# Patient Record
Sex: MALE | Race: WHITE | NOT HISPANIC OR LATINO | URBAN - METROPOLITAN AREA
[De-identification: names, ages, dates, MRNs, and addresses within clinical notes are randomized per-mention and may not be internally consistent; named-entity substitution may affect disease eponyms.]

---

## 2018-08-17 LAB — HCV AB SER-ACNC: NON REACTIVE

## 2020-07-10 ENCOUNTER — OFFICE VISIT (OUTPATIENT)
Dept: FAMILY MEDICINE CLINIC | Facility: CLINIC | Age: 65
End: 2020-07-10
Payer: COMMERCIAL

## 2020-07-10 VITALS
TEMPERATURE: 97.7 F | HEIGHT: 71 IN | BODY MASS INDEX: 40.18 KG/M2 | RESPIRATION RATE: 16 BRPM | SYSTOLIC BLOOD PRESSURE: 122 MMHG | OXYGEN SATURATION: 95 % | HEART RATE: 80 BPM | DIASTOLIC BLOOD PRESSURE: 82 MMHG | WEIGHT: 287 LBS

## 2020-07-10 DIAGNOSIS — Z12.5 PROSTATE CANCER SCREENING: ICD-10-CM

## 2020-07-10 DIAGNOSIS — Z28.39 IMMUNIZATIONS INCOMPLETE: ICD-10-CM

## 2020-07-10 DIAGNOSIS — Z00.00 MEDICARE ANNUAL WELLNESS VISIT, INITIAL: ICD-10-CM

## 2020-07-10 DIAGNOSIS — I10 ESSENTIAL HYPERTENSION: Primary | ICD-10-CM

## 2020-07-10 PROBLEM — S90.219A SUBUNGUAL HEMATOMA OF GREAT TOE: Status: ACTIVE | Noted: 2020-07-10

## 2020-07-10 PROBLEM — E66.01 CLASS 3 SEVERE OBESITY IN ADULT (HCC): Status: ACTIVE | Noted: 2020-07-10

## 2020-07-10 PROBLEM — E66.813 CLASS 3 SEVERE OBESITY IN ADULT (HCC): Status: ACTIVE | Noted: 2020-07-10

## 2020-07-10 PROCEDURE — G0438 PPPS, INITIAL VISIT: HCPCS | Performed by: FAMILY MEDICINE

## 2020-07-10 PROCEDURE — 1036F TOBACCO NON-USER: CPT | Performed by: FAMILY MEDICINE

## 2020-07-10 PROCEDURE — 99214 OFFICE O/P EST MOD 30 MIN: CPT | Performed by: FAMILY MEDICINE

## 2020-07-10 PROCEDURE — 90732 PPSV23 VACC 2 YRS+ SUBQ/IM: CPT | Performed by: FAMILY MEDICINE

## 2020-07-10 PROCEDURE — 1125F AMNT PAIN NOTED PAIN PRSNT: CPT | Performed by: FAMILY MEDICINE

## 2020-07-10 PROCEDURE — 1160F RVW MEDS BY RX/DR IN RCRD: CPT | Performed by: FAMILY MEDICINE

## 2020-07-10 PROCEDURE — G0009 ADMIN PNEUMOCOCCAL VACCINE: HCPCS | Performed by: FAMILY MEDICINE

## 2020-07-10 PROCEDURE — 1170F FXNL STATUS ASSESSED: CPT | Performed by: FAMILY MEDICINE

## 2020-07-10 PROCEDURE — 3008F BODY MASS INDEX DOCD: CPT | Performed by: FAMILY MEDICINE

## 2020-07-10 PROCEDURE — 4040F PNEUMOC VAC/ADMIN/RCVD: CPT | Performed by: FAMILY MEDICINE

## 2020-07-10 RX ORDER — OLMESARTAN MEDOXOMIL AND HYDROCHLOROTHIAZIDE 20/12.5 20; 12.5 MG/1; MG/1
1 TABLET ORAL DAILY
COMMUNITY
End: 2020-07-10 | Stop reason: SDUPTHER

## 2020-07-10 RX ORDER — OLMESARTAN MEDOXOMIL AND HYDROCHLOROTHIAZIDE 20/12.5 20; 12.5 MG/1; MG/1
1 TABLET ORAL DAILY
Qty: 90 TABLET | Refills: 4 | Status: SHIPPED | OUTPATIENT
Start: 2020-07-10 | End: 2020-07-21 | Stop reason: SDUPTHER

## 2020-07-10 RX ORDER — AMLODIPINE BESYLATE 5 MG/1
5 TABLET ORAL DAILY
Qty: 90 TABLET | Refills: 4 | Status: SHIPPED | OUTPATIENT
Start: 2020-07-10 | End: 2020-07-21 | Stop reason: SDUPTHER

## 2020-07-10 RX ORDER — AMLODIPINE BESYLATE 5 MG/1
5 TABLET ORAL DAILY
COMMUNITY
End: 2020-07-10 | Stop reason: SDUPTHER

## 2020-07-10 NOTE — PROGRESS NOTES
Assessment and Plan:     Problem List Items Addressed This Visit        Cardiovascular and Mediastinum    Essential hypertension - Primary     Well controlled         Relevant Medications    amLODIPine (NORVASC) 5 mg tablet    olmesartan-hydrochlorothiazide (BENICAR HCT) 20-12 5 MG per tablet    Other Relevant Orders    Comprehensive metabolic panel       Other    Prostate cancer screening     Check PSA         Relevant Orders    PSA, Total Screen    Medicare annual wellness visit, initial      Other Visit Diagnoses     Immunizations incomplete        Relevant Orders    PNEUMOCOCCAL POLYSACCHARIDE VACCINE 23-VALENT =>1YO SQ IM           Preventive health issues were discussed with patient, and age appropriate screening tests were ordered as noted in patient's After Visit Summary  Personalized health advice and appropriate referrals for health education or preventive services given if needed, as noted in patient's After Visit Summary       History of Present Illness:     Patient presents for Medicare Annual Wellness visit    Patient Care Team:  Mandy Jauregui MD as PCP - General (Family Medicine)  Mandy Jauregui MD as PCP - [address] [place] [address] (RTE)     Problem List:     Patient Active Problem List   Diagnosis    Prostate cancer screening    Medicare annual wellness visit, initial    Essential hypertension    Subungual hematoma of great toe    Class 3 severe obesity in adult [facility])      Past Medical and Surgical History:     Past Medical History:   Diagnosis Date    Hypertension      Past Surgical History:   Procedure Laterality Date    COLONOSCOPY  04/27/2015    TONSILLECTOMY      VASECTOMY        Family History:     Family History   Problem Relation Age of Onset    Cancer Mother     Hypertension Father       Social History:        Social History     Socioeconomic History    Marital status: /Civil Union     Spouse name: None    Number of children: None    Years of education: None    Highest education level: None   Occupational History    None   Social Needs    Financial resource strain: None    Food insecurity:     Worry: None     Inability: None    Transportation needs:     Medical: None     Non-medical: None   Tobacco Use    Smoking status: Never Smoker    Smokeless tobacco: Never Used   Substance and Sexual Activity    Alcohol use: Yes     Frequency: 2-4 times a month     Drinks per session: 1 or 2     Binge frequency: Never     Comment: occasional    Drug use: Never    Sexual activity: None   Lifestyle    Physical activity:     Days per week: None     Minutes per session: None    Stress: None   Relationships    Social connections:     Talks on phone: None     Gets together: None     Attends Faith service: None     Active member of club or organization: None     Attends meetings of clubs or organizations: None     Relationship status: None    Intimate partner violence:     Fear of current or ex partner: None     Emotionally abused: None     Physically abused: None     Forced sexual activity: None   Other Topics Concern    None   Social History Narrative    None      Medications and Allergies:     Current Outpatient Medications   Medication Sig Dispense Refill    amLODIPine (NORVASC) 5 mg tablet Take 1 tablet (5 mg total) by mouth daily 90 tablet 4    olmesartan-hydrochlorothiazide (BENICAR HCT) 20-12 5 MG per tablet Take 1 tablet by mouth daily 90 tablet 4     No current facility-administered medications for this visit        No Known Allergies   Immunizations:     Immunization History   Administered Date(s) Administered    Tdap 08/04/2017      Health Maintenance:         Topic Date Due    Hepatitis C Screening  Completed         Topic Date Due    Pneumococcal Vaccine: 65+ Years (1 of 2 - PCV13) 05/13/2019    Influenza Vaccine  07/01/2020      Medicare Health Risk Assessment:     /82 (BP Location: Right arm, Patient Position: Sitting)   Pulse 80   Temp 97 7 °F (36 5 °C)   Resp 16   Ht 5' 11" (1 803 m)   Wt 130 kg (287 lb)   SpO2 95%   BMI 40 03 kg/m²          Health Risk Assessment:   Patient rates overall health as very good  Patient feels that their physical health rating is same  Eyesight was rated as same  Hearing was rated as slightly worse  Patient feels that their emotional and mental health rating is slightly better  Pain experienced in the last 7 days has been none  Patient states that he has experienced weight loss or gain in last 6 months  Depression Screening:   PHQ-2 Score: 0      Fall Risk Screening: In the past year, patient has experienced: no history of falling in past year      Home Safety:  Patient does not have trouble with stairs inside or outside of their home  Patient has working smoke alarms and has working carbon monoxide detector  Home safety hazards include: none  Nutrition:   Current diet is Regular  Medications:   Patient is currently taking over-the-counter supplements  OTC medications include: multi-vitamins, vitamin d  Patient is able to manage medications  Activities of Daily Living (ADLs)/Instrumental Activities of Daily Living (IADLs):   Walk and transfer into and out of bed and chair?: Yes  Dress and groom yourself?: Yes    Bathe or shower yourself?: Yes    Feed yourself?  Yes  Do your laundry/housekeeping?: Yes  Manage your money, pay your bills and track your expenses?: Yes  Make your own meals?: Yes    Do your own shopping?: Yes    Previous Hospitalizations:   Any hospitalizations or ED visits within the last 12 months?: No      Advance Care Planning:   Living will: No    Durable POA for healthcare: No    Advanced directive: No    Advanced directive counseling given: No    Five wishes given: Yes    Patient declined ACP directive: Yes    End of Life Decisions reviewed with patient: Yes    Provider agrees with end of life decisions: Yes      Cognitive Screening:   Provider or family/friend/caregiver concerned regarding cognition?: No    PREVENTIVE SCREENINGS      Cardiovascular Screening:      Due for: Lipid Panel      Diabetes Screening:       Due for: Blood Glucose      Colorectal Cancer Screening:     General: Screening Not Indicated      Prostate Cancer Screening:      Due for: PSA      Osteoporosis Screening:    General: Screening Not Indicated      Abdominal Aortic Aneurysm (AAA) Screening:    Risk factors include: age between 73-67 yo        General: Screening Not Indicated      Lung Cancer Screening:     General: Screening Not Indicated      Hepatitis C Screening:    General: Screening Current and Screening Not Indicated    Assessment/Plan:         Problem List Items Addressed This Visit        Cardiovascular and Mediastinum    Essential hypertension - Primary     Well controlled         Relevant Medications    amLODIPine (NORVASC) 5 mg tablet    olmesartan-hydrochlorothiazide (BENICAR HCT) 20-12 5 MG per tablet    Other Relevant Orders    Comprehensive metabolic panel       Other    Prostate cancer screening     Check PSA         Relevant Orders    PSA, Total Screen    Medicare annual wellness visit, initial      Other Visit Diagnoses     Immunizations incomplete        Relevant Orders    PNEUMOCOCCAL POLYSACCHARIDE VACCINE 23-VALENT =>1YO SQ IM            Subjective:  pt here for medicare wellness and interval visitt for HTN [t due fpr  Labs PSA cmp and lipids pt had colonoscopy non    Smoker is overweight pt has noticed dark spots on both large toenails last month no pain     Patient ID: Paul Liang is a 76 y o  male  HPI    The following portions of the patient's history were reviewed and updated as appropriate:   He has a past medical history of Hypertension  ,  does not have any pertinent problems on file  ,   has a past surgical history that includes Colonoscopy (04/27/2015); Tonsillectomy; and Vasectomy  ,  family history includes Cancer in his mother; Hypertension in his father  ,   reports that he has never smoked  He has never used smokeless tobacco  He reports that he drinks alcohol  He reports that he does not use drugs  ,  has No Known Allergies     Current Outpatient Medications   Medication Sig Dispense Refill    amLODIPine (NORVASC) 5 mg tablet Take 1 tablet (5 mg total) by mouth daily 90 tablet 4    olmesartan-hydrochlorothiazide (BENICAR HCT) 20-12 5 MG per tablet Take 1 tablet by mouth daily 90 tablet 4     No current facility-administered medications for this visit  Review of Systems   Constitutional: Negative for appetite change, chills, fatigue and fever  Respiratory: Negative for cough, chest tightness and shortness of breath  Cardiovascular: Negative for chest pain, palpitations and leg swelling  Gastrointestinal: Negative for abdominal pain, constipation, diarrhea, nausea and vomiting  Genitourinary: Negative for difficulty urinating and frequency  Musculoskeletal: Negative for arthralgias, back pain and neck pain  Skin: Positive for color change (both large toenails)  Negative for rash  Neurological: Negative for dizziness, weakness, light-headedness, numbness and headaches  Hematological: Does not bruise/bleed easily  Psychiatric/Behavioral: Negative for dysphoric mood and sleep disturbance  The patient is not nervous/anxious  Objective:  Vitals:    07/10/20 0830   BP: 122/82   BP Location: Right arm   Patient Position: Sitting   Pulse: 80   Resp: 16   Temp: 97 7 °F (36 5 °C)   SpO2: 95%   Weight: 130 kg (287 lb)   Height: 5' 11" (1 803 m)     Body mass index is 40 03 kg/m²  Physical Exam   Constitutional: He is oriented to person, place, and time  He appears well-developed  No distress  HENT:   Mouth/Throat: Oropharynx is clear and moist    Eyes: Pupils are equal, round, and reactive to light  Conjunctivae and EOM are normal    Neck: Normal range of motion  Neck supple  Carotid bruit is not present  No thyromegaly present     Cardiovascular: Normal rate, regular rhythm, normal heart sounds and intact distal pulses  No murmur heard  Pulmonary/Chest: Effort normal and breath sounds normal  No respiratory distress  He exhibits no tenderness  Abdominal: Soft  Bowel sounds are normal  He exhibits no distension  There is no tenderness  Lymphadenopathy:     He has no cervical adenopathy  Neurological: He is alert and oriented to person, place, and time  He displays normal reflexes  No cranial nerve deficit  Skin: Skin is warm and dry  Large toenails with dark  Spots appear like subungal hematomas   Psychiatric: He has a normal mood and affect  His behavior is normal  Thought content normal    Vitals reviewed  BMI Counseling: Body mass index is 40 03 kg/m²  The BMI is above normal  Nutrition recommendations include reducing portion sizes, 3-5 servings of fruits/vegetables daily, consuming healthier snacks, moderation in carbohydrate intake and increasing intake of lean protein  Exercise recommendations include moderate aerobic physical activity for 150 minutes/week, exercising 3-5 times per week and strength training exercises    Onelia Albarado MD

## 2020-07-10 NOTE — PATIENT INSTRUCTIONS

## 2020-07-21 DIAGNOSIS — I10 ESSENTIAL HYPERTENSION: ICD-10-CM

## 2020-07-21 RX ORDER — OLMESARTAN MEDOXOMIL AND HYDROCHLOROTHIAZIDE 20/12.5 20; 12.5 MG/1; MG/1
1 TABLET ORAL DAILY
Qty: 90 TABLET | Refills: 4 | Status: SHIPPED | OUTPATIENT
Start: 2020-07-21 | End: 2021-07-06

## 2020-07-21 RX ORDER — AMLODIPINE BESYLATE 5 MG/1
5 TABLET ORAL DAILY
Qty: 90 TABLET | Refills: 4 | Status: SHIPPED | OUTPATIENT
Start: 2020-07-21 | End: 2021-07-06

## 2020-07-21 NOTE — TELEPHONE ENCOUNTER
Needs new script w/refills for a 90 day supply for:   Olmesartan- hydrochlorothiazide 20-12 5mg, 1x a day;   Amlodipine 5mg, 1x a day    [id number] [facility]

## 2020-08-02 LAB
BUN SERPL-MCNC: 14 MG/DL (ref 7–25)
BUN/CREAT SERPL: NORMAL (CALC) (ref 6–22)
CALCIUM SERPL-MCNC: 9.4 MG/DL (ref 8.6–10.3)
CHLORIDE SERPL-SCNC: 103 MMOL/L (ref 98–110)
CO2 SERPL-SCNC: 29 MMOL/L (ref 20–32)
CREAT SERPL-MCNC: 0.9 MG/DL (ref 0.7–1.25)
GLUCOSE SERPL-MCNC: 90 MG/DL (ref 65–99)
POTASSIUM SERPL-SCNC: 4.2 MMOL/L (ref 3.5–5.3)
PSA SERPL-MCNC: 0.6 NG/ML
SL AMB EGFR AFRICAN AMERICAN: 103 ML/MIN/1.73M2
SL AMB EGFR NON AFRICAN AMERICAN: 89 ML/MIN/1.73M2
SODIUM SERPL-SCNC: 139 MMOL/L (ref 135–146)

## 2021-03-30 DIAGNOSIS — Z23 ENCOUNTER FOR IMMUNIZATION: ICD-10-CM

## 2021-04-03 ENCOUNTER — IMMUNIZATIONS (OUTPATIENT)
Dept: FAMILY MEDICINE CLINIC | Facility: HOSPITAL | Age: 66
End: 2021-04-03

## 2021-04-03 DIAGNOSIS — Z23 ENCOUNTER FOR IMMUNIZATION: Primary | ICD-10-CM

## 2021-04-03 PROCEDURE — 91300 SARS-COV-2 / COVID-19 MRNA VACCINE (PFIZER-BIONTECH) 30 MCG: CPT

## 2021-04-03 PROCEDURE — 0001A SARS-COV-2 / COVID-19 MRNA VACCINE (PFIZER-BIONTECH) 30 MCG: CPT

## 2021-04-27 ENCOUNTER — IMMUNIZATIONS (OUTPATIENT)
Dept: FAMILY MEDICINE CLINIC | Facility: HOSPITAL | Age: 66
End: 2021-04-27

## 2021-04-27 DIAGNOSIS — Z23 ENCOUNTER FOR IMMUNIZATION: Primary | ICD-10-CM

## 2021-04-27 PROCEDURE — 91300 SARS-COV-2 / COVID-19 MRNA VACCINE (PFIZER-BIONTECH) 30 MCG: CPT

## 2021-04-27 PROCEDURE — 0002A SARS-COV-2 / COVID-19 MRNA VACCINE (PFIZER-BIONTECH) 30 MCG: CPT

## 2021-07-06 DIAGNOSIS — I10 ESSENTIAL HYPERTENSION: ICD-10-CM

## 2021-07-06 RX ORDER — AMLODIPINE BESYLATE 5 MG/1
TABLET ORAL
Qty: 90 TABLET | Refills: 3 | Status: SHIPPED | OUTPATIENT
Start: 2021-07-06 | End: 2022-06-24

## 2021-07-06 RX ORDER — OLMESARTAN MEDOXOMIL AND HYDROCHLOROTHIAZIDE 20/12.5 20; 12.5 MG/1; MG/1
1 TABLET ORAL DAILY
Qty: 90 TABLET | Refills: 3 | Status: SHIPPED | OUTPATIENT
Start: 2021-07-06 | End: 2022-06-24

## 2021-07-12 PROBLEM — S90.219A SUBUNGUAL HEMATOMA OF GREAT TOE: Status: RESOLVED | Noted: 2020-07-10 | Resolved: 2021-07-12

## 2021-07-12 NOTE — PROGRESS NOTES
Assessment/Plan:         Problem List Items Addressed This Visit        Cardiovascular and Mediastinum    Essential hypertension     Well controlled            Other    Medicare annual wellness visit, initial     reviewed         Class 3 severe obesity in adult [facility])     Discussion on diet and exercise         Annual physical exam - Primary     ABN signed pt wants complete physical         Relevant Orders    Comprehensive metabolic panel    Lipid panel    PSA, Total Screen    CBC and differential    Urinalysis with microscopic      Other Visit Diagnoses     Body mass index (BMI)40 0-44 9, adult ([address] )                Subjective:  Pt here for interval visit hTN obesity pt wants to have regular physical ABN signed     Patient ID: Araseli Owens is a 78 y o  male  HPI    The following portions of the patient's history were reviewed and updated as appropriate:   Past Medical History:  He has a past medical history of Hypertension  ,  _______________________________________________________________________  Medical Problems:  does not have any pertinent problems on file ,  _______________________________________________________________________  Past Surgical History:   has a past surgical history that includes Colonoscopy (04/27/2015); Tonsillectomy; and Vasectomy  ,  _______________________________________________________________________  Family History:  family history includes Cancer in his mother; Hypertension in his father ,  _______________________________________________________________________  Social History:   reports that he has never smoked  He has never used smokeless tobacco  He reports current alcohol use of about 4 0 standard drinks of alcohol per week  He reports that he does not use drugs  ,  _______________________________________________________________________  Allergies:  has No Known Allergies     _______________________________________________________________________  Current Outpatient Medications Medication Sig Dispense Refill    amLODIPine (NORVASC) 5 mg tablet TAKE 1 TABLET BY MOUTH  DAILY 90 tablet 3    olmesartan-hydrochlorothiazide (BENICAR HCT) 20-12 5 MG per tablet TAKE 1 TABLET BY MOUTH  DAILY 90 tablet 3     No current facility-administered medications for this visit      _______________________________________________________________________  Review of Systems   Constitutional: Negative for appetite change, chills, fatigue and fever  Respiratory: Negative for cough, chest tightness and shortness of breath  Cardiovascular: Negative for chest pain, palpitations and leg swelling  Gastrointestinal: Negative for abdominal pain, constipation, diarrhea, nausea and vomiting  Genitourinary: Negative for difficulty urinating and frequency  Musculoskeletal: Negative for arthralgias, back pain and neck pain  Skin: Negative for rash  Neurological: Negative for dizziness, weakness, light-headedness, numbness and headaches  Hematological: Does not bruise/bleed easily  Psychiatric/Behavioral: Negative for dysphoric mood and sleep disturbance  The patient is not nervous/anxious  Objective:  Vitals:    07/20/21 0852   BP: 130/70   BP Location: Left arm   Patient Position: Sitting   Pulse: 72   Resp: 16   Temp: 98 3 °F (36 8 °C)   SpO2: 94%   Weight: 132 kg (292 lb)   Height: 5' 11" (1 803 m)     Body mass index is 40 73 kg/m²  Physical Exam  Vitals reviewed  Constitutional:       General: He is not in acute distress  Appearance: Normal appearance  He is well-developed  He is obese  He is not ill-appearing  Eyes:      Extraocular Movements: Extraocular movements intact  Conjunctiva/sclera: Conjunctivae normal       Pupils: Pupils are equal, round, and reactive to light  Neck:      Thyroid: No thyromegaly  Vascular: No carotid bruit  Cardiovascular:      Rate and Rhythm: Normal rate and regular rhythm  Pulses: Normal pulses        Heart sounds: Normal heart sounds  No murmur heard  Pulmonary:      Effort: Pulmonary effort is normal       Breath sounds: Normal breath sounds  Chest:      Chest wall: No tenderness  Abdominal:      General: Bowel sounds are normal  There is no distension  Palpations: Abdomen is soft  Tenderness: There is no abdominal tenderness  Musculoskeletal:      Cervical back: Normal range of motion and neck supple  Comments: Varicose veins both lower extremities   Lymphadenopathy:      Cervical: No cervical adenopathy  Skin:     General: Skin is warm and dry  Comments: Hyperpigmented anterior lower legs for years no change   Neurological:      General: No focal deficit present  Mental Status: He is alert and oriented to person, place, and time  Mental status is at baseline  Cranial Nerves: No cranial nerve deficit  Psychiatric:         Mood and Affect: Mood normal          Behavior: Behavior normal        BMI Counseling: Body mass index is 40 73 kg/m²  The BMI is above normal  Nutrition recommendations include 3-5 servings of fruits/vegetables daily, reducing fast food intake, consuming healthier snacks, decreasing soda and/or juice intake and moderation in carbohydrate intake  Exercise recommendations include exercising 3-5 times per week and strength training exercises

## 2021-07-14 ENCOUNTER — RA CDI HCC (OUTPATIENT)
Dept: OTHER | Facility: HOSPITAL | Age: 66
End: 2021-07-14

## 2021-07-14 NOTE — PROGRESS NOTES
[facility]  coding opportunities          Chart reviewed, no opportunity found: CHART REVIEWED, NO OPPORTUNITY FOUND                     Patients insurance company: [employer]

## 2021-07-20 ENCOUNTER — OFFICE VISIT (OUTPATIENT)
Dept: FAMILY MEDICINE CLINIC | Facility: CLINIC | Age: 66
End: 2021-07-20
Payer: COMMERCIAL

## 2021-07-20 VITALS
SYSTOLIC BLOOD PRESSURE: 130 MMHG | OXYGEN SATURATION: 94 % | HEART RATE: 72 BPM | TEMPERATURE: 98.3 F | DIASTOLIC BLOOD PRESSURE: 70 MMHG | WEIGHT: 292 LBS | BODY MASS INDEX: 40.88 KG/M2 | RESPIRATION RATE: 16 BRPM | HEIGHT: 71 IN

## 2021-07-20 DIAGNOSIS — E66.01 CLASS 3 SEVERE OBESITY DUE TO EXCESS CALORIES WITH SERIOUS COMORBIDITY AND BODY MASS INDEX (BMI) OF 40.0 TO 44.9 IN ADULT (HCC): ICD-10-CM

## 2021-07-20 DIAGNOSIS — I10 ESSENTIAL HYPERTENSION: ICD-10-CM

## 2021-07-20 DIAGNOSIS — Z00.00 MEDICARE ANNUAL WELLNESS VISIT, INITIAL: ICD-10-CM

## 2021-07-20 DIAGNOSIS — Z00.00 ANNUAL PHYSICAL EXAM: Primary | ICD-10-CM

## 2021-07-20 PROCEDURE — G0438 PPPS, INITIAL VISIT: HCPCS | Performed by: FAMILY MEDICINE

## 2021-07-20 PROCEDURE — 99214 OFFICE O/P EST MOD 30 MIN: CPT | Performed by: FAMILY MEDICINE

## 2021-07-20 NOTE — PROGRESS NOTES
Assessment and Plan:     Problem List Items Addressed This Visit     None           Preventive health issues were discussed with patient, and age appropriate screening tests were ordered as noted in patient's After Visit Summary  Personalized health advice and appropriate referrals for health education or preventive services given if needed, as noted in patient's After Visit Summary  History of Present Illness:     Patient presents for Medicare Annual Wellness visit    Patient Care Team:  Torey Lopez MD as PCP - General (Family Medicine)  Torey Lopez MD as PCP - [address] [place], [place] [address]     Problem List:     Patient Active Problem List   Diagnosis    Prostate cancer screening    Medicare annual wellness visit, initial    Essential hypertension    Class 3 severe obesity in adult [facility])      Past Medical and Surgical History:     Past Medical History:   Diagnosis Date    Hypertension      Past Surgical History:   Procedure Laterality Date    COLONOSCOPY  04/27/2015    TONSILLECTOMY      VASECTOMY        Family History:     Family History   Problem Relation Age of Onset    Cancer Mother     Hypertension Father       Social History:     Social History     Socioeconomic History    Marital status: /Civil Union     Spouse name: None    Number of children: None    Years of education: None    Highest education level: None   Occupational History    None   Tobacco Use    Smoking status: Never Smoker    Smokeless tobacco: Never Used   Vaping Use    Vaping Use: Never used   Substance and Sexual Activity    Alcohol use:  Yes     Alcohol/week: 4 0 standard drinks     Types: 1 Glasses of wine, 1 Cans of beer, 1 Shots of liquor, 1 Standard drinks or equivalent per week     Comment: occasional    Drug use: Never    Sexual activity: None   Other Topics Concern    None   Social History Narrative    None     Social Determinants of Health     Financial Resource Strain:     Difficulty of Paying Living Expenses:    Food Insecurity:     Worried About [address] in the Last Year:     [address] [facility] in the Last Year:    Transportation Needs:     Lack of Transportation (Medical):  Lack of Transportation (Non-Medical):    Physical Activity:     Days of Exercise per Week:     Minutes of Exercise per Session:    Stress:     Feeling of Stress :    Social Connections:     Frequency of Communication with Friends and Family:     Frequency of Social Gatherings with Friends and Family:     Attends [employer] Services:     Active Member of Clubs or Organizations:     Attends Club or Organization Meetings:     Marital Status:    Intimate Partner Violence:     Fear of Current or Ex-Partner:     Emotionally Abused:     Physically Abused:     Sexually Abused:       Medications and Allergies:     Current Outpatient Medications   Medication Sig Dispense Refill    amLODIPine (NORVASC) 5 mg tablet TAKE 1 TABLET BY MOUTH  DAILY 90 tablet 3    olmesartan-hydrochlorothiazide (BENICAR HCT) 20-12 5 MG per tablet TAKE 1 TABLET BY MOUTH  DAILY 90 tablet 3     No current facility-administered medications for this visit       No Known Allergies   Immunizations:     Immunization History   Administered Date(s) Administered    INFLUENZA 10/14/2017, 08/31/2018    Influenza Split High Dose Preservative Free IM 11/09/2019    Pneumococcal Polysaccharide PPV23 07/10/2020    SARS-CoV-2 / COVID-19 mRNA IM ([employer]) 04/03/2021, 04/27/2021    Tdap 04/30/2008, 08/04/2017      Health Maintenance:         Topic Date Due    Colorectal Cancer Screening  08/16/2029    Hepatitis C Screening  Completed         Topic Date Due    Influenza Vaccine (1) 09/01/2021      Medicare Health Risk Assessment:     /70 (BP Location: Left arm, Patient Position: Sitting)   Pulse 72   Temp 98 3 °F (36 8 °C)   Resp 16   Ht 5' 11" (1 803 m)   Wt 132 kg (292 lb)   SpO2 94%   BMI 40 73 kg/m²      Anita Copeland is here for his Subsequent Wellness visit  Health Risk Assessment:   Patient rates overall health as very good  Patient feels that their physical health rating is same  Patient is satisfied with their life  Eyesight was rated as same  Hearing was rated as slightly worse  Patient feels that their emotional and mental health rating is same  Patients states they are sometimes angry  Patient states they are sometimes unusually tired/fatigued  Pain experienced in the last 7 days has been none  Patient states that he has experienced weight loss or gain in last 6 months  Depression Screening:   PHQ-2 Score: 0      Fall Risk Screening: In the past year, patient has experienced: no history of falling in past year      Home Safety:  Patient does not have trouble with stairs inside or outside of their home  Patient has working smoke alarms and has working carbon monoxide detector  Home safety hazards include: none  Nutrition:   Current diet is Regular  Medications:   Patient is currently taking over-the-counter supplements  OTC medications include: vitamins  Patient is able to manage medications  Activities of Daily Living (ADLs)/Instrumental Activities of Daily Living (IADLs):   Walk and transfer into and out of bed and chair?: Yes  Dress and groom yourself?: Yes    Bathe or shower yourself?: Yes    Feed yourself?  Yes  Do your laundry/housekeeping?: Yes  Manage your money, pay your bills and track your expenses?: Yes  Make your own meals?: Yes    Do your own shopping?: Yes    Previous Hospitalizations:   Any hospitalizations or ED visits within the last 12 months?: No      Advance Care Planning:   Living will: No    Durable POA for healthcare: No    Advanced directive: No    Advanced directive counseling given: Yes    Five wishes given: No    Patient declined ACP directive: Yes      Cognitive Screening:   Provider or family/friend/caregiver concerned regarding cognition?: No    PREVENTIVE SCREENINGS      Cardiovascular Screening:      Due for: Lipid Panel      Diabetes Screening:     General: Screening Current    Due for: Blood Glucose      Colorectal Cancer Screening:     General: Screening Current      Prostate Cancer Screening:    General: Screening Current      Osteoporosis Screening:    General: Screening Not Indicated      Abdominal Aortic Aneurysm (AAA) Screening:    Risk factors include: age between 73-69 yo        Lung Cancer Screening:     General: Screening Not Indicated      Hepatitis C Screening:    General: Screening Current    Screening, Brief Intervention, and Referral to Treatment (SBIRT)    Screening  Typical number of drinks in a day: 1  Typical number of drinks in a week: 1  Interpretation: Low risk drinking behavior      Single Item Drug Screening:  How often have you used an illegal drug (including marijuana) or a prescription medication for non-medical reasons in the past year? never    Single Item Drug Screen Score: 0  Interpretation: Negative screen for possible drug use disorder      Shayna Loaiza MD

## 2021-07-20 NOTE — PATIENT INSTRUCTIONS

## 2021-08-02 LAB
ALBUMIN SERPL-MCNC: 3.8 G/DL (ref 3.6–5.1)
ALBUMIN/GLOB SERPL: 1.9 (CALC) (ref 1–2.5)
ALP SERPL-CCNC: 78 U/L (ref 35–144)
ALT SERPL-CCNC: 25 U/L (ref 9–46)
APPEARANCE UR: CLEAR
AST SERPL-CCNC: 20 U/L (ref 10–35)
BACTERIA UR QL AUTO: NORMAL /HPF
BASOPHILS # BLD AUTO: 33 CELLS/UL (ref 0–200)
BASOPHILS NFR BLD AUTO: 0.5 %
BILIRUB SERPL-MCNC: 0.6 MG/DL (ref 0.2–1.2)
BILIRUB UR QL STRIP: NEGATIVE
BUN SERPL-MCNC: 19 MG/DL (ref 7–25)
BUN/CREAT SERPL: ABNORMAL (CALC) (ref 6–22)
CALCIUM SERPL-MCNC: 9.2 MG/DL (ref 8.6–10.3)
CHLORIDE SERPL-SCNC: 104 MMOL/L (ref 98–110)
CHOLEST SERPL-MCNC: 131 MG/DL
CHOLEST/HDLC SERPL: 2 (CALC)
CO2 SERPL-SCNC: 29 MMOL/L (ref 20–32)
COLOR UR: YELLOW
CREAT SERPL-MCNC: 0.9 MG/DL (ref 0.7–1.25)
EOSINOPHIL # BLD AUTO: 271 CELLS/UL (ref 15–500)
EOSINOPHIL NFR BLD AUTO: 4.1 %
ERYTHROCYTE [DISTWIDTH] IN BLOOD BY AUTOMATED COUNT: 13.3 % (ref 11–15)
GLOBULIN SER CALC-MCNC: 2 G/DL (CALC) (ref 1.9–3.7)
GLUCOSE SERPL-MCNC: 94 MG/DL (ref 65–99)
GLUCOSE UR QL STRIP: NEGATIVE
HCT VFR BLD AUTO: 44.2 % (ref 38.5–50)
HDLC SERPL-MCNC: 65 MG/DL
HGB BLD-MCNC: 14.6 G/DL (ref 13.2–17.1)
HGB UR QL STRIP: NEGATIVE
HYALINE CASTS #/AREA URNS LPF: NORMAL /LPF
KETONES UR QL STRIP: NEGATIVE
LDLC SERPL CALC-MCNC: 52 MG/DL (CALC)
LEUKOCYTE ESTERASE UR QL STRIP: NEGATIVE
LYMPHOCYTES # BLD AUTO: 1135 CELLS/UL (ref 850–3900)
LYMPHOCYTES NFR BLD AUTO: 17.2 %
MCH RBC QN AUTO: 29.8 PG (ref 27–33)
MCHC RBC AUTO-ENTMCNC: 33 G/DL (ref 32–36)
MCV RBC AUTO: 90.2 FL (ref 80–100)
MONOCYTES # BLD AUTO: 845 CELLS/UL (ref 200–950)
MONOCYTES NFR BLD AUTO: 12.8 %
NEUTROPHILS # BLD AUTO: 4316 CELLS/UL (ref 1500–7800)
NEUTROPHILS NFR BLD AUTO: 65.4 %
NITRITE UR QL STRIP: NEGATIVE
NONHDLC SERPL-MCNC: 66 MG/DL (CALC)
PH UR STRIP: 7 [PH] (ref 5–8)
PLATELET # BLD AUTO: 251 THOUSAND/UL (ref 140–400)
PMV BLD REES-ECKER: 9.9 FL (ref 7.5–12.5)
POTASSIUM SERPL-SCNC: 4.2 MMOL/L (ref 3.5–5.3)
PROT SERPL-MCNC: 5.8 G/DL (ref 6.1–8.1)
PROT UR QL STRIP: NEGATIVE
PSA SERPL-MCNC: 0.4 NG/ML
RBC # BLD AUTO: 4.9 MILLION/UL (ref 4.2–5.8)
RBC #/AREA URNS HPF: NORMAL /HPF
SL AMB EGFR AFRICAN AMERICAN: 102 ML/MIN/1.73M2
SL AMB EGFR NON AFRICAN AMERICAN: 88 ML/MIN/1.73M2
SODIUM SERPL-SCNC: 140 MMOL/L (ref 135–146)
SP GR UR STRIP: 1.02 (ref 1–1.03)
SQUAMOUS #/AREA URNS HPF: NORMAL /HPF
TRIGL SERPL-MCNC: 59 MG/DL
WBC # BLD AUTO: 6.6 THOUSAND/UL (ref 3.8–10.8)
WBC #/AREA URNS HPF: NORMAL /HPF

## 2022-06-24 DIAGNOSIS — I10 ESSENTIAL HYPERTENSION: ICD-10-CM

## 2022-06-24 RX ORDER — OLMESARTAN MEDOXOMIL AND HYDROCHLOROTHIAZIDE 20/12.5 20; 12.5 MG/1; MG/1
1 TABLET ORAL DAILY
Qty: 90 TABLET | Refills: 3 | Status: SHIPPED | OUTPATIENT
Start: 2022-06-24 | End: 2022-07-29 | Stop reason: SDUPTHER

## 2022-06-24 RX ORDER — AMLODIPINE BESYLATE 5 MG/1
TABLET ORAL
Qty: 90 TABLET | Refills: 3 | Status: SHIPPED | OUTPATIENT
Start: 2022-06-24 | End: 2022-07-29 | Stop reason: SDUPTHER

## 2022-07-29 ENCOUNTER — OFFICE VISIT (OUTPATIENT)
Dept: FAMILY MEDICINE CLINIC | Facility: CLINIC | Age: 67
End: 2022-07-29
Payer: COMMERCIAL

## 2022-07-29 VITALS
RESPIRATION RATE: 16 BRPM | DIASTOLIC BLOOD PRESSURE: 80 MMHG | SYSTOLIC BLOOD PRESSURE: 110 MMHG | TEMPERATURE: 97 F | HEIGHT: 71 IN | WEIGHT: 282 LBS | HEART RATE: 76 BPM | OXYGEN SATURATION: 95 % | BODY MASS INDEX: 39.48 KG/M2

## 2022-07-29 DIAGNOSIS — Z00.00 MEDICARE ANNUAL WELLNESS VISIT, SUBSEQUENT: Primary | ICD-10-CM

## 2022-07-29 DIAGNOSIS — E66.01 CLASS 2 SEVERE OBESITY DUE TO EXCESS CALORIES WITH SERIOUS COMORBIDITY AND BODY MASS INDEX (BMI) OF 39.0 TO 39.9 IN ADULT (HCC): ICD-10-CM

## 2022-07-29 DIAGNOSIS — I10 ESSENTIAL HYPERTENSION: ICD-10-CM

## 2022-07-29 DIAGNOSIS — Z12.5 PROSTATE CANCER SCREENING: ICD-10-CM

## 2022-07-29 DIAGNOSIS — Z00.00 ANNUAL PHYSICAL EXAM: ICD-10-CM

## 2022-07-29 PROBLEM — E66.812 CLASS 2 SEVERE OBESITY DUE TO EXCESS CALORIES WITH SERIOUS COMORBIDITY AND BODY MASS INDEX (BMI) OF 39.0 TO 39.9 IN ADULT (HCC): Status: ACTIVE | Noted: 2020-07-10

## 2022-07-29 PROCEDURE — G0439 PPPS, SUBSEQ VISIT: HCPCS | Performed by: FAMILY MEDICINE

## 2022-07-29 PROCEDURE — 99214 OFFICE O/P EST MOD 30 MIN: CPT | Performed by: FAMILY MEDICINE

## 2022-07-29 RX ORDER — AMLODIPINE BESYLATE 5 MG/1
5 TABLET ORAL DAILY
Qty: 90 TABLET | Refills: 3 | Status: SHIPPED | OUTPATIENT
Start: 2022-07-29

## 2022-07-29 RX ORDER — OLMESARTAN MEDOXOMIL AND HYDROCHLOROTHIAZIDE 20/12.5 20; 12.5 MG/1; MG/1
1 TABLET ORAL DAILY
Qty: 90 TABLET | Refills: 3 | Status: SHIPPED | OUTPATIENT
Start: 2022-07-29

## 2022-07-29 NOTE — PATIENT INSTRUCTIONS

## 2022-07-29 NOTE — ASSESSMENT & PLAN NOTE
Check psa  hard stop for medicare waiver unable to get out of loop to order will order after 8/2 if  This is the reason pt aware to call for order

## 2022-07-29 NOTE — PROGRESS NOTES
Assessment and Plan:     Problem List Items Addressed This Visit        Cardiovascular and Mediastinum    Essential hypertension     Well controlled on current therapy continue with current medications and will reassess next visit           Relevant Medications    amLODIPine (NORVASC) 5 mg tablet    olmesartan-hydrochlorothiazide (BENICAR HCT) 20-12 5 MG per tablet       Other    Prostate cancer screening     Check psa  hard stop for medicare waiver unable to get out of loop to order will order after 8/2 if  This is the reason pt aware to call for order         Medicare annual wellness visit, subsequent - Primary     reviewed         Class 2 severe obesity due to excess calories with serious comorbidity and body mass index (BMI) of 39 0 to 39 9 in adult [facility])     Discussion on diet and exercise guidelines for weight loss and  health reviewed with pt            Annual physical exam     Check routine labs           Relevant Orders    CBC and differential    Comprehensive metabolic panel    Lipid panel    Urinalysis with microscopic           Preventive health issues were discussed with patient, and age appropriate screening tests were ordered as noted in patient's After Visit Summary  Personalized health advice and appropriate referrals for health education or preventive services given if needed, as noted in patient's After Visit Summary  History of Present Illness:     Patient presents for a Medicare Wellness Visit    HPI   Patient Care Team:  Anna Rice MD as PCP - General (Family Medicine)  Anna Rice MD as PCP - [address] [place], [place] [address]Th [facility]     Review of Systems:     Review of Systems   Constitutional: Negative for appetite change, chills, fatigue and fever  Respiratory: Negative for cough, chest tightness and shortness of breath  Cardiovascular: Negative for chest pain, palpitations and leg swelling  Gastrointestinal: Negative for abdominal pain, constipation, diarrhea, nausea and vomiting  Genitourinary: Negative for difficulty urinating and frequency  Musculoskeletal: Negative for arthralgias, back pain and neck pain  Skin: Negative for rash  Neurological: Negative for dizziness, weakness, light-headedness, numbness and headaches  Hematological: Does not bruise/bleed easily  Psychiatric/Behavioral: Negative for dysphoric mood and sleep disturbance  The patient is not nervous/anxious  Problem List:     Patient Active Problem List   Diagnosis    Prostate cancer screening    Medicare annual wellness visit, subsequent    Essential hypertension    Class 2 severe obesity due to excess calories with serious comorbidity and body mass index (BMI) of 39 0 to 39 9 in adult [facility])    Annual physical exam      Past Medical and Surgical History:     Past Medical History:   Diagnosis Date    Hypertension      Past Surgical History:   Procedure Laterality Date    COLONOSCOPY  04/27/2015    TONSILLECTOMY      VASECTOMY        Family History:     Family History   Problem Relation Age of Onset    Cancer Mother     Hypertension Father       Social History:     Social History     Socioeconomic History    Marital status: /Civil Union     Spouse name: None    Number of children: None    Years of education: None    Highest education level: None   Occupational History    None   Tobacco Use    Smoking status: Never Smoker    Smokeless tobacco: Never Used   Vaping Use    Vaping Use: Never used   Substance and Sexual Activity    Alcohol use:  Yes     Alcohol/week: 4 0 standard drinks     Types: 1 Glasses of wine, 1 Cans of beer, 1 Shots of liquor, 1 Standard drinks or equivalent per week     Comment: occasional    Drug use: Never    Sexual activity: Not Currently   Other Topics Concern    None   Social History Narrative    None     Social Determinants of Health     Financial Resource Strain: Not on file   Food Insecurity: Not on file   Transportation Needs: Not on file   Physical Activity: Not on file   Stress: Not on file   Social Connections: Not on file   Intimate Partner Violence: Not on file   Housing Stability: Not on file      Medications and Allergies:     Current Outpatient Medications   Medication Sig Dispense Refill    amLODIPine (NORVASC) 5 mg tablet Take 1 tablet (5 mg total) by mouth daily 90 tablet 3    olmesartan-hydrochlorothiazide (BENICAR HCT) 20-12 5 MG per tablet Take 1 tablet by mouth daily 90 tablet 3     No current facility-administered medications for this visit  No Known Allergies   Immunizations:     Immunization History   Administered Date(s) Administered    COVID-19 PFIZER VACCINE 0 3 ML IM 04/03/2021, 04/27/2021, 11/15/2021    COVID-19 Pfizer vac (Hemal-sucrose, gray cap) 12 yr+ IM 05/12/2022    INFLUENZA 10/14/2017, 08/31/2018, 10/22/2021    Influenza Split High Dose Preservative Free IM 11/09/2019    Pneumococcal Polysaccharide PPV23 07/10/2020    Tdap 04/30/2008, 08/04/2017      Health Maintenance:         Topic Date Due    Colorectal Cancer Screening  08/16/2029    Hepatitis C Screening  Completed         Topic Date Due    Pneumococcal Vaccine: 65+ Years (2 - PCV) 07/10/2021    Influenza Vaccine (1) 09/01/2022      Medicare Screening Tests and Risk Assessments:     Hermann Pearce is here for his Subsequent Wellness visit  Health Risk Assessment:   Patient rates overall health as very good  Patient feels that their physical health rating is same  Patient is very satisfied with their life  Eyesight was rated as same  Hearing was rated as same  Patient feels that their emotional and mental health rating is same  Patients states they are never, rarely angry  Patient states they are sometimes unusually tired/fatigued  Pain experienced in the last 7 days has been none  Patient states that he has experienced weight loss or gain in last 6 months  Depression Screening:   PHQ-2 Score: 0      Fall Risk Screening:    In the past year, patient has experienced: no history of falling in past year      Home Safety:  Patient does not have trouble with stairs inside or outside of their home  Patient has working smoke alarms and has working carbon monoxide detector  Home safety hazards include: none  Nutrition:   Current diet is Regular  Medications:   Patient is currently taking over-the-counter supplements  OTC medications include: see medication list  Patient is able to manage medications  Activities of Daily Living (ADLs)/Instrumental Activities of Daily Living (IADLs):   Walk and transfer into and out of bed and chair?: Yes  Dress and groom yourself?: Yes    Bathe or shower yourself?: Yes    Feed yourself? Yes  Do your laundry/housekeeping?: Yes  Manage your money, pay your bills and track your expenses?: Yes  Make your own meals?: Yes    Do your own shopping?: Yes    Previous Hospitalizations:   Any hospitalizations or ED visits within the last 12 months?: No      Advance Care Planning:   Living will: No      Cognitive Screening:   Provider or family/friend/caregiver concerned regarding cognition?: No    PREVENTIVE SCREENINGS      Cardiovascular Screening:    General: Screening Current      Diabetes Screening:     General: Screening Current      Colorectal Cancer Screening:     General: Screening Current      Prostate Cancer Screening:    General: Risks and Benefits Discussed    Due for: PSA      Osteoporosis Screening:    General: Screening Not Indicated      Abdominal Aortic Aneurysm (AAA) Screening:    Risk factors include: age between 73-69 yo        Lung Cancer Screening:     General: Screening Not Indicated      Hepatitis C Screening:    General: Screening Current    Screening, Brief Intervention, and Referral to Treatment (SBIRT)    Screening  Typical number of drinks in a day: 0  Typical number of drinks in a week: 5  Interpretation: Low risk drinking behavior      Single Item Drug Screening:  How often have you used an illegal drug (including marijuana) or a prescription medication for non-medical reasons in the past year? never    Single Item Drug Screen Score: 0  Interpretation: Negative screen for possible drug use disorder    No exam data present     Physical Exam:     /80 (BP Location: Left arm, Patient Position: Sitting, Cuff Size: Large)   Pulse 76   Temp (!) 97 °F (36 1 °C) (Temporal)   Resp 16   Ht 5' 11" (1 803 m)   Wt 128 kg (282 lb)   SpO2 95%   BMI 39 33 kg/m²     Physical Exam     Linda Hathaway MD

## 2022-07-29 NOTE — PROGRESS NOTES
7Assessment/Plan:         Problem List Items Addressed This Visit        Cardiovascular and Mediastinum    Essential hypertension     Well controlled on current therapy continue with current medications and will reassess next visit           Relevant Medications    amLODIPine (NORVASC) 5 mg tablet    olmesartan-hydrochlorothiazide (BENICAR HCT) 20-12 5 MG per tablet       Other    Prostate cancer screening     Check psa  hard stop for medicare waiver unable to get out of loop to order will order after 8/2 if  This is the reason pt aware to call for order         Medicare annual wellness visit, subsequent - Primary     reviewed         Class 2 severe obesity due to excess calories with serious comorbidity and body mass index (BMI) of 39 0 to 39 9 in [facility])     Discussion on diet and exercise guidelines for weight loss and  health reviewed with pt            Annual physical exam     Check routine labs           Relevant Orders    CBC and differential    Comprehensive metabolic panel    Lipid panel    Urinalysis with microscopic            Subjective: pt here for interval visit  [address] [place] [address] and pt wants a complete physical examination and labs states his secondary will cover  (ABN in chart)  HTN     Patient ID: Brayan Ruiz is a 75 y o  male  HPI    The following portions of the patient's history were reviewed and updated as appropriate:   Past Medical History:  He has a past medical history of Hypertension  ,  _______________________________________________________________________  Medical Problems:  does not have any pertinent problems on file ,  _______________________________________________________________________  Past Surgical History:   has a past surgical history that includes Colonoscopy (04/27/2015); Tonsillectomy; and Vasectomy  ,  _______________________________________________________________________  Family History:  family history includes Cancer in his mother; Hypertension in his father ,  _______________________________________________________________________  Social History:   reports that he has never smoked  He has never used smokeless tobacco  He reports current alcohol use of about 4 0 standard drinks of alcohol per week  He reports that he does not use drugs  ,  _______________________________________________________________________  Allergies:  has No Known Allergies     _______________________________________________________________________  Current Outpatient Medications   Medication Sig Dispense Refill    amLODIPine (NORVASC) 5 mg tablet Take 1 tablet (5 mg total) by mouth daily 90 tablet 3    olmesartan-hydrochlorothiazide (BENICAR HCT) 20-12 5 MG per tablet Take 1 tablet by mouth daily 90 tablet 3     No current facility-administered medications for this visit      _______________________________________________________________________  Review of Systems   Constitutional: Negative for appetite change, chills, fatigue and fever  Respiratory: Negative for cough, chest tightness and shortness of breath  Cardiovascular: Negative for chest pain, palpitations and leg swelling  Gastrointestinal: Negative for abdominal pain, constipation, diarrhea, nausea and vomiting  Genitourinary: Negative for difficulty urinating and frequency  Musculoskeletal: Negative for arthralgias, back pain and neck pain  Skin: Negative for rash  Neurological: Negative for dizziness, weakness, light-headedness, numbness and headaches  Hematological: Does not bruise/bleed easily  Psychiatric/Behavioral: Negative for dysphoric mood and sleep disturbance  The patient is not nervous/anxious  Objective:  Vitals:    07/29/22 1021   BP: 110/80   BP Location: Left arm   Patient Position: Sitting   Cuff Size: Large   Pulse: 76   Resp: 16   Temp: (!) 97 °F (36 1 °C)   TempSrc: Temporal   SpO2: 95%   Weight: 128 kg (282 lb)   Height: 5' 11" (1 803 m)     Body mass index is 39 33 kg/m²  Physical Exam  Constitutional:       General: He is not in acute distress  Appearance: Normal appearance  He is obese  He is not ill-appearing  HENT:      Right Ear: Tympanic membrane and external ear normal       Left Ear: Tympanic membrane and external ear normal       Nose: Nose normal       Mouth/Throat:      Mouth: Mucous membranes are moist    Eyes:      General:         Right eye: No discharge  Left eye: No discharge  Extraocular Movements: Extraocular movements intact  Conjunctiva/sclera: Conjunctivae normal       Pupils: Pupils are equal, round, and reactive to light  Neck:      Thyroid: No thyromegaly  Vascular: No carotid bruit  Trachea: No tracheal deviation  Cardiovascular:      Rate and Rhythm: Normal rate and regular rhythm  Pulses: Normal pulses  Heart sounds: Normal heart sounds  No murmur heard  Pulmonary:      Effort: Pulmonary effort is normal       Breath sounds: Normal breath sounds  No wheezing or rales  Chest:      Chest wall: No tenderness  Breasts:      Right: Normal       Left: Normal        Abdominal:      General: Bowel sounds are normal  There is no distension  Palpations: Abdomen is soft  There is no mass  Tenderness: There is no abdominal tenderness  Hernia: No hernia is present  There is no hernia in the left inguinal area  Genitourinary:     Penis: Normal        Testes: Normal       Prostate: Normal       Rectum: Normal    Musculoskeletal:         General: Normal range of motion  Cervical back: Normal range of motion and neck supple  Right lower leg: No edema  Left lower leg: No edema  Comments: Varicose veins  With hyperpigmentation lower anterior extremities   Lymphadenopathy:      Cervical: No cervical adenopathy  Lower Body: No right inguinal adenopathy  No left inguinal adenopathy  Skin:     General: Skin is warm and dry  Findings: No rash        Comments: No abn moles Neurological:      General: No focal deficit present  Mental Status: He is alert and oriented to person, place, and time  Mental status is at baseline  Cranial Nerves: No cranial nerve deficit  Sensory: No sensory deficit  Motor: No weakness or abnormal muscle tone  Coordination: Coordination normal       Gait: Gait normal       Deep Tendon Reflexes: Reflexes normal    Psychiatric:         Mood and Affect: Mood normal          Behavior: Behavior normal        BMI Counseling: Body mass index is 39 33 kg/m²  The BMI is above normal  Nutrition recommendations include 3-5 servings of fruits/vegetables daily, reducing fast food intake, consuming healthier snacks, decreasing soda and/or juice intake, moderation in carbohydrate intake and increasing intake of lean protein  Exercise recommendations include exercising 3-5 times per week and strength training exercises

## 2022-08-02 ENCOUNTER — TELEPHONE (OUTPATIENT)
Dept: FAMILY MEDICINE CLINIC | Facility: CLINIC | Age: 67
End: 2022-08-02

## 2022-08-02 DIAGNOSIS — Z12.5 SCREENING FOR PROSTATE CANCER: Primary | ICD-10-CM

## 2022-08-02 DIAGNOSIS — R35.1 NOCTURIA: ICD-10-CM

## 2022-08-02 NOTE — TELEPHONE ENCOUNTER
Patient is looking for a psa bloodwork script  He said when he was in for his ov last week the computer would not let Dr Randy Salazar enter it in her computer  Patient wants to  the script tomorrow morning      Any questions or problems call him at    patient ph # [phone]

## 2022-08-07 LAB
APPEARANCE UR: CLEAR
BACTERIA UR QL AUTO: NORMAL /HPF
BASOPHILS # BLD AUTO: 49 CELLS/UL (ref 0–200)
BASOPHILS NFR BLD AUTO: 0.9 %
BILIRUB UR QL STRIP: NEGATIVE
CHOLEST SERPL-MCNC: 139 MG/DL
CHOLEST/HDLC SERPL: 1.9 (CALC)
COLOR UR: YELLOW
EOSINOPHIL # BLD AUTO: 292 CELLS/UL (ref 15–500)
EOSINOPHIL NFR BLD AUTO: 5.4 %
ERYTHROCYTE [DISTWIDTH] IN BLOOD BY AUTOMATED COUNT: 12.4 % (ref 11–15)
GLUCOSE UR QL STRIP: NEGATIVE
HCT VFR BLD AUTO: 47.1 % (ref 38.5–50)
HDLC SERPL-MCNC: 72 MG/DL
HGB BLD-MCNC: 15.5 G/DL (ref 13.2–17.1)
HGB UR QL STRIP: NEGATIVE
HYALINE CASTS #/AREA URNS LPF: NORMAL /LPF
KETONES UR QL STRIP: NEGATIVE
LDLC SERPL CALC-MCNC: 55 MG/DL (CALC)
LEUKOCYTE ESTERASE UR QL STRIP: NEGATIVE
LYMPHOCYTES # BLD AUTO: 1253 CELLS/UL (ref 850–3900)
LYMPHOCYTES NFR BLD AUTO: 23.2 %
MCH RBC QN AUTO: 30.2 PG (ref 27–33)
MCHC RBC AUTO-ENTMCNC: 32.9 G/DL (ref 32–36)
MCV RBC AUTO: 91.8 FL (ref 80–100)
MONOCYTES # BLD AUTO: 616 CELLS/UL (ref 200–950)
MONOCYTES NFR BLD AUTO: 11.4 %
NEUTROPHILS # BLD AUTO: 3191 CELLS/UL (ref 1500–7800)
NEUTROPHILS NFR BLD AUTO: 59.1 %
NITRITE UR QL STRIP: NEGATIVE
NONHDLC SERPL-MCNC: 67 MG/DL (CALC)
PH UR STRIP: 6 [PH] (ref 5–8)
PLATELET # BLD AUTO: 245 THOUSAND/UL (ref 140–400)
PMV BLD REES-ECKER: 9.5 FL (ref 7.5–12.5)
PROT UR QL STRIP: NEGATIVE
PSA FREE MFR SERPL: 60 % (CALC)
PSA FREE SERPL-MCNC: 0.3 NG/ML
PSA SERPL-MCNC: 0.5 NG/ML
RBC # BLD AUTO: 5.13 MILLION/UL (ref 4.2–5.8)
RBC #/AREA URNS HPF: NORMAL /HPF
SP GR UR STRIP: 1.01 (ref 1–1.03)
SQUAMOUS #/AREA URNS HPF: NORMAL /HPF
TRIGL SERPL-MCNC: 43 MG/DL
WBC # BLD AUTO: 5.4 THOUSAND/UL (ref 3.8–10.8)
WBC #/AREA URNS HPF: NORMAL /HPF

## 2022-10-11 PROBLEM — Z00.00 MEDICARE ANNUAL WELLNESS VISIT, SUBSEQUENT: Status: RESOLVED | Noted: 2020-07-10 | Resolved: 2022-10-11

## 2023-01-07 ENCOUNTER — TELEPHONE (OUTPATIENT)
Dept: OTHER | Facility: OTHER | Age: 68
End: 2023-01-07

## 2023-01-07 NOTE — TELEPHONE ENCOUNTER
Patient is calling in to notify the office that he tested for covid today  He is feeling ok and only has a stuffy nose at this time

## 2023-07-31 ENCOUNTER — OFFICE VISIT (OUTPATIENT)
Dept: FAMILY MEDICINE CLINIC | Facility: CLINIC | Age: 68
End: 2023-07-31
Payer: COMMERCIAL

## 2023-07-31 VITALS
WEIGHT: 280 LBS | TEMPERATURE: 98.7 F | RESPIRATION RATE: 16 BRPM | HEIGHT: 71 IN | BODY MASS INDEX: 39.2 KG/M2 | HEART RATE: 86 BPM | SYSTOLIC BLOOD PRESSURE: 110 MMHG | OXYGEN SATURATION: 98 % | DIASTOLIC BLOOD PRESSURE: 72 MMHG

## 2023-07-31 DIAGNOSIS — I10 ESSENTIAL HYPERTENSION: ICD-10-CM

## 2023-07-31 DIAGNOSIS — Z00.00 ANNUAL PHYSICAL EXAM: ICD-10-CM

## 2023-07-31 DIAGNOSIS — Z00.00 MEDICARE ANNUAL WELLNESS VISIT, SUBSEQUENT: Primary | ICD-10-CM

## 2023-07-31 DIAGNOSIS — Z12.5 PROSTATE CANCER SCREENING: ICD-10-CM

## 2023-07-31 DIAGNOSIS — K57.90 DIVERTICULOSIS: ICD-10-CM

## 2023-07-31 DIAGNOSIS — E66.01 CLASS 2 SEVERE OBESITY DUE TO EXCESS CALORIES WITH SERIOUS COMORBIDITY AND BODY MASS INDEX (BMI) OF 39.0 TO 39.9 IN ADULT (HCC): ICD-10-CM

## 2023-07-31 PROCEDURE — G0439 PPPS, SUBSEQ VISIT: HCPCS | Performed by: FAMILY MEDICINE

## 2023-07-31 PROCEDURE — 99397 PER PM REEVAL EST PAT 65+ YR: CPT | Performed by: FAMILY MEDICINE

## 2023-07-31 NOTE — PROGRESS NOTES
Assessment and Plan:     Problem List Items Addressed This Visit        Digestive    Diverticulosis     colonoscopy 2019 sever  or flare ups pt to avoid nuts big seeds peanuts popcorn ,corn            Cardiovascular and Mediastinum    Essential hypertension     Well controlled on current therapy continue with current medications and will reassess next visit              Other    Prostate cancer screening     Check psa         Relevant Orders    PSA, Total Screen    Medicare annual wellness visit, subsequent - Primary     Screenings ordered           Relevant Orders    PSA, Total Screen    Class 2 severe obesity due to excess calories with serious comorbidity and body mass index (BMI) of 39.0 to 39.9 in [facility])     Discussion on diet and exercise guidelines for weight loss and  health reviewed with pt            Annual physical exam     Check routine labs pt states has secondary covers physical            Relevant Orders    CBC and differential    Comprehensive metabolic panel    Lipid panel    Urinalysis with microscopic        Preventive health issues were discussed with patient, and age appropriate screening tests were ordered as noted in patient's After Visit Summary. Personalized health advice and appropriate referrals for health education or preventive services given if needed, as noted in patient's After Visit Summary.      History of Present Illness:     Patient presents for a Medicare Wellness Visit    HPI   Patient Care Team:  Alok Maxwell MD as PCP - General (Family Medicine)  Alok Maxwell MD as PCP - [address] [place] [address] ([facility])     Review of Systems:     Review of Systems     Problem List:     Patient Active Problem List   Diagnosis   • Prostate cancer screening   • Medicare annual wellness visit, subsequent   • Essential hypertension   • Class 2 severe obesity due to excess calories with serious comorbidity and body mass index (BMI) of 39.0 to 39.9 in [facility])   • Annual physical exam   • Diverticulosis      Past Medical and Surgical History:     Past Medical History:   Diagnosis Date   • Hypertension      Past Surgical History:   Procedure Laterality Date   • COLONOSCOPY  04/27/2015   • TONSILLECTOMY     • VASECTOMY        Family History:     Family History   Problem Relation Age of Onset   • Cancer Mother    • Hypertension Father       Social History:     Social History     Socioeconomic History   • Marital status: /Civil Union     Spouse name: None   • Number of children: None   • Years of education: None   • Highest education level: None   Occupational History   • None   Tobacco Use   • Smoking status: Never   • Smokeless tobacco: Never   Vaping Use   • Vaping Use: Never used   Substance and Sexual Activity   • Alcohol use: Yes     Alcohol/week: 4.0 standard drinks of alcohol     Types: 1 Glasses of wine, 1 Cans of beer, 1 Shots of liquor, 1 Standard drinks or equivalent per week     Comment: occasional   • Drug use: Never   • Sexual activity: Not Currently   Other Topics Concern   • None   Social History Narrative   • None     Social Determinants of Health     Financial Resource Strain: Low Risk  (7/31/2023)    Overall Financial Resource Strain (CARDIA)    • Difficulty of Paying Living Expenses: Not hard at all   Food Insecurity: Not on file   Transportation Needs: No Transportation Needs (7/31/2023)    PRAPARE - Transportation    • Lack of Transportation (Medical): No    • Lack of Transportation (Non-Medical):  No   Physical Activity: Not on file   Stress: Not on file   Social Connections: Not on file   Intimate Partner Violence: Not on file   Housing Stability: Not on file      Medications and Allergies:     Current Outpatient Medications   Medication Sig Dispense Refill   • amLODIPine (NORVASC) 5 mg tablet TAKE 1 TABLET BY MOUTH  DAILY 100 tablet 2   • olmesartan-hydrochlorothiazide (BENICAR HCT) 20-12.5 MG per tablet TAKE 1 TABLET BY MOUTH  DAILY 100 tablet 2     No current facility-administered medications for this visit. No Known Allergies   Immunizations:     Immunization History   Administered Date(s) Administered   • COVID-19 PFIZER VACCINE 0.3 ML IM 04/03/2021, 04/27/2021, 11/15/2021   • COVID-19 Pfizer Vac BIVALENT Hemal-sucrose 12 Yr+ IM (BOOSTER ONLY) 09/28/2022   • COVID-19 Pfizer vac (Hemal-sucrose, gray cap) 12 yr+ IM 05/12/2022   • INFLUENZA 10/14/2017, 08/31/2018, 10/22/2021, 09/28/2022   • Influenza Split High Dose Preservative Free IM 11/09/2019   • Pneumococcal Polysaccharide PPV23 07/10/2020   • Tdap 04/30/2008, 08/04/2017      Health Maintenance:         Topic Date Due   • Colorectal Cancer Screening  08/16/2029   • Hepatitis C Screening  Completed         Topic Date Due   • Pneumococcal Vaccine: 65+ Years (2 - PCV) 07/10/2021   • COVID-19 Vaccine (6 - Pfizer series) 01/28/2023   • Influenza Vaccine (1) 09/01/2023      Medicare Screening Tests and Risk Assessments:     Adriana Humphrey is here for his Subsequent Wellness visit. Health Risk Assessment:   Patient rates overall health as very good. Patient feels that their physical health rating is same. Patient is very satisfied with their life. Eyesight was rated as same. Hearing was rated as same. Patient feels that their emotional and mental health rating is same. Patients states they are often angry. Patient states they are often unusually tired/fatigued. Pain experienced in the last 7 days has been none. Patient states that he has experienced no weight loss or gain in last 6 months. Depression Screening:   PHQ-2 Score: 0      Fall Risk Screening: In the past year, patient has experienced: no history of falling in past year      Home Safety:  Patient has trouble with stairs inside or outside of their home. Patient has working smoke alarms and has working carbon monoxide detector. Home safety hazards include: none. Nutrition:   Current diet is Regular.      Medications:   Patient is currently taking over-the-counter supplements. OTC medications include: see medication list. Patient is not able to manage medications. Activities of Daily Living (ADLs)/Instrumental Activities of Daily Living (IADLs):   Walk and transfer into and out of bed and chair?: Yes  Dress and groom yourself?: Yes    Bathe or shower yourself?: Yes    Feed yourself? Yes  Do your laundry/housekeeping?: Yes  Manage your money, pay your bills and track your expenses?: Yes  Make your own meals?: Yes    Do your own shopping?: Yes    Previous Hospitalizations:   Any hospitalizations or ED visits within the last 12 months?: No      Advance Care Planning:   Living will: No    Durable POA for healthcare: No    Advanced directive: No    Advanced directive counseling given: Yes    Five wishes given: Yes    Patient declined ACP directive: No    End of Life Decisions reviewed with patient: Yes    Provider agrees with end of life decisions: Yes      Cognitive Screening:   Provider or family/friend/caregiver concerned regarding cognition?: No    PREVENTIVE SCREENINGS      Cardiovascular Screening:    General: Screening Current      Diabetes Screening:     General: Screening Current      Colorectal Cancer Screening:     General: Screening Current      Prostate Cancer Screening:    General: Screening Current      Osteoporosis Screening:    General: Screening Not Indicated      Abdominal Aortic Aneurysm (AAA) Screening:    Risk factors include: age between 70-77 yo        General: Screening Not Indicated      Lung Cancer Screening:     General: Screening Not Indicated      Hepatitis C Screening:    General: Screening Current    Screening, Brief Intervention, and Referral to Treatment (SBIRT)    Screening  Typical number of drinks in a day: 0  Typical number of drinks in a week: 4  Interpretation: Low risk drinking behavior.     AUDIT-C Screenin) How often did you have a drink containing alcohol in the past year? 2 to 4 times a month  2) How many drinks did you have on a typical day when you were drinking in the past year? 1 to 2  3) How often did you have 6 or more drinks on one occasion in the past year? never    AUDIT-C Score: 2  Interpretation: Score 0-3 (male): Negative screen for alcohol misuse    Single Item Drug Screening:  How often have you used an illegal drug (including marijuana) or a prescription medication for non-medical reasons in the past year? never    Single Item Drug Screen Score: 0  Interpretation: Negative screen for possible drug use disorder    No results found.      Physical Exam:     /72 (BP Location: Left arm, Patient Position: Sitting, Cuff Size: Large)   Pulse 86   Temp 98.7 °F (37.1 °C) (Tympanic)   Resp 16   Ht 5' 11" (1.803 m)   Wt 127 kg (280 lb)   SpO2 98%   BMI 39.05 kg/m²     Physical Exam     Magaly Tiwari MD

## 2023-07-31 NOTE — PROGRESS NOTES
Name: Yang Suero      : 1955      MRN: [id number]  Encounter Provider: Jocy Calle MD  Encounter Date: 2023   Encounter department: [facility] [address] MEDICINE    Assessment & Plan     1. Medicare annual wellness visit, subsequent  Assessment & Plan:  Screenings ordered      Orders:  -     PSA, Total Screen; Future    2. Essential hypertension  Assessment & Plan:  Well controlled on current therapy continue with current medications and will reassess next visit        3. Class 2 severe obesity due to excess calories with serious comorbidity and body mass index (BMI) of 39.0 to 39.9 in adult [facility])  Assessment & Plan:  Discussion on diet and exercise guidelines for weight loss and  health reviewed with pt         4. Annual physical exam  Assessment & Plan:  Check routine labs pt states has secondary covers physical       Orders:  -     CBC and differential; Future  -     Comprehensive metabolic panel; Future; Expected date: 2023  -     Lipid panel; Future; Expected date: 2023  -     Urinalysis with microscopic    5. Diverticulosis  Assessment & Plan:  colonoscopy 2019 sever  or flare ups pt to avoid nuts big seeds peanuts popcorn ,corn      6. Prostate cancer screening  Assessment & Plan:  Check psa    Orders:  -     PSA, Total Screen; Future           Subjective      HPI  Pt here for physical and medicare wellness pt also with HTN   Review of Systems   Constitutional: Negative for appetite change, chills, fatigue and fever. Respiratory: Negative for cough, chest tightness and shortness of breath. Cardiovascular: Negative for chest pain, palpitations and leg swelling. Gastrointestinal: Negative for abdominal pain, constipation, diarrhea, nausea and vomiting. Genitourinary: Negative for difficulty urinating and frequency. Musculoskeletal: Negative for arthralgias, back pain, gait problem and neck pain. Skin: Negative for rash.    Neurological: Negative for dizziness, weakness, light-headedness, numbness and headaches. Hematological: Does not bruise/bleed easily. Psychiatric/Behavioral: Negative for dysphoric mood and sleep disturbance. The patient is not nervous/anxious. Current Outpatient Medications on File Prior to Visit   Medication Sig   • amLODIPine (NORVASC) 5 mg tablet TAKE 1 TABLET BY MOUTH  DAILY   • olmesartan-hydrochlorothiazide (BENICAR HCT) 20-12.5 MG per tablet TAKE 1 TABLET BY MOUTH  DAILY       Objective     /72 (BP Location: Left arm, Patient Position: Sitting, Cuff Size: Large)   Pulse 86   Temp 98.7 °F (37.1 °C) (Tympanic)   Resp 16   Ht 5' 11" (1.803 m)   Wt 127 kg (280 lb)   SpO2 98%   BMI 39.05 kg/m²     Physical Exam  Constitutional:       General: He is not in acute distress. Appearance: Normal appearance. He is not ill-appearing. HENT:      Right Ear: Tympanic membrane and external ear normal.      Left Ear: Tympanic membrane and external ear normal.      Nose: Nose normal.      Mouth/Throat:      Mouth: Mucous membranes are moist.   Eyes:      General:         Right eye: No discharge. Left eye: No discharge. Extraocular Movements: Extraocular movements intact. Conjunctiva/sclera: Conjunctivae normal.      Pupils: Pupils are equal, round, and reactive to light. Neck:      Thyroid: No thyromegaly. Vascular: No carotid bruit. Trachea: No tracheal deviation. Cardiovascular:      Rate and Rhythm: Normal rate and regular rhythm. Pulses: Normal pulses. Heart sounds: Normal heart sounds. No murmur heard. Pulmonary:      Effort: Pulmonary effort is normal.      Breath sounds: Normal breath sounds. No wheezing or rales. Chest:      Chest wall: No tenderness. Breasts:     Right: Normal.      Left: Normal.   Abdominal:      General: Bowel sounds are normal. There is no distension. Palpations: Abdomen is soft. There is no mass. Tenderness:  There is no abdominal tenderness. Hernia: No hernia is present. There is no hernia in the left inguinal area. Genitourinary:     Penis: Normal.       Testes: Normal.      Prostate: Normal.      Rectum: Normal.   Musculoskeletal:         General: Normal range of motion. Cervical back: Normal range of motion and neck supple. Right lower leg: No edema. Left lower leg: No edema. Lymphadenopathy:      Cervical: No cervical adenopathy. Lower Body: No right inguinal adenopathy. No left inguinal adenopathy. Skin:     General: Skin is warm and dry. Findings: No rash. Comments: No abn moles   Neurological:      General: No focal deficit present. Mental Status: He is alert and oriented to person, place, and time. Mental status is at baseline. Cranial Nerves: No cranial nerve deficit. Sensory: No sensory deficit. Motor: No weakness or abnormal muscle tone. Coordination: Coordination normal.      Gait: Gait normal.      Deep Tendon Reflexes: Reflexes normal.   Psychiatric:         Mood and Affect: Mood normal.         Behavior: Behavior normal.     BMI Counseling: Body mass index is 39.05 kg/m². The BMI is above normal. Nutrition recommendations include 3-5 servings of fruits/vegetables daily, reducing fast food intake, consuming healthier snacks, decreasing soda and/or juice intake, moderation in carbohydrate intake, increasing intake of lean protein and reducing intake of saturated fat and trans fat. Exercise recommendations include exercising 3-5 times per week and strength training exercises.   Reza Mcdonnell MD

## 2023-07-31 NOTE — PROGRESS NOTES
Assessment and Plan:     Problem List Items Addressed This Visit        Digestive    Diverticulosis     colonoscopy 2019 sever  or flare ups pt to avoid nuts big seeds peanuts popcorn ,corn            Cardiovascular and Mediastinum    Essential hypertension     Well controlled on current therapy continue with current medications and will reassess next visit              Other    Prostate cancer screening     Check psa         Relevant Orders    PSA, Total Screen    Medicare annual wellness visit, subsequent - Primary     Screenings ordered           Relevant Orders    PSA, Total Screen    Class 2 severe obesity due to excess calories with serious comorbidity and body mass index (BMI) of 39.0 to 39.9 in [facility])     Discussion on diet and exercise guidelines for weight loss and  health reviewed with pt            Annual physical exam     Check routine labs pt states has secondary covers physical            Relevant Orders    CBC and differential    Comprehensive metabolic panel    Lipid panel    Urinalysis with microscopic        Preventive health issues were discussed with patient, and age appropriate screening tests were ordered as noted in patient's After Visit Summary. Personalized health advice and appropriate referrals for health education or preventive services given if needed, as noted in patient's After Visit Summary.      History of Present Illness:     Patient presents for a Medicare Wellness Visit    HPI   Patient Care Team:  Danna Hyde MD as PCP - General (Family Medicine)  Danna Hyde MD as PCP - [address] [place] [address] ([facility])     Review of Systems:     Review of Systems     Problem List:     Patient Active Problem List   Diagnosis   • Prostate cancer screening   • Medicare annual wellness visit, subsequent   • Essential hypertension   • Class 2 severe obesity due to excess calories with serious comorbidity and body mass index (BMI) of 39.0 to 39.9 in [facility])   • Annual physical exam   • Diverticulosis      Past Medical and Surgical History:     Past Medical History:   Diagnosis Date   • Hypertension      Past Surgical History:   Procedure Laterality Date   • COLONOSCOPY  04/27/2015   • TONSILLECTOMY     • VASECTOMY        Family History:     Family History   Problem Relation Age of Onset   • Cancer Mother    • Hypertension Father       Social History:     Social History     Socioeconomic History   • Marital status: /Civil Union     Spouse name: None   • Number of children: None   • Years of education: None   • Highest education level: None   Occupational History   • None   Tobacco Use   • Smoking status: Never   • Smokeless tobacco: Never   Vaping Use   • Vaping Use: Never used   Substance and Sexual Activity   • Alcohol use: Yes     Alcohol/week: 4.0 standard drinks of alcohol     Types: 1 Glasses of wine, 1 Cans of beer, 1 Shots of liquor, 1 Standard drinks or equivalent per week     Comment: occasional   • Drug use: Never   • Sexual activity: Not Currently   Other Topics Concern   • None   Social History Narrative   • None     Social Determinants of Health     Financial Resource Strain: Low Risk  (7/31/2023)    Overall Financial Resource Strain (CARDIA)    • Difficulty of Paying Living Expenses: Not hard at all   Food Insecurity: Not on file   Transportation Needs: No Transportation Needs (7/31/2023)    PRAPARE - Transportation    • Lack of Transportation (Medical): No    • Lack of Transportation (Non-Medical):  No   Physical Activity: Not on file   Stress: Not on file   Social Connections: Not on file   Intimate Partner Violence: Not on file   Housing Stability: Not on file      Medications and Allergies:     Current Outpatient Medications   Medication Sig Dispense Refill   • amLODIPine (NORVASC) 5 mg tablet TAKE 1 TABLET BY MOUTH  DAILY 100 tablet 2   • olmesartan-hydrochlorothiazide (BENICAR HCT) 20-12.5 MG per tablet TAKE 1 TABLET BY MOUTH  DAILY 100 tablet 2     No current facility-administered medications for this visit. No Known Allergies   Immunizations:     Immunization History   Administered Date(s) Administered   • COVID-19 PFIZER VACCINE 0.3 ML IM 04/03/2021, 04/27/2021, 11/15/2021   • COVID-19 Pfizer Vac BIVALENT Hemal-sucrose 12 Yr+ IM (BOOSTER ONLY) 09/28/2022   • COVID-19 Pfizer vac (Hemal-sucrose, gray cap) 12 yr+ IM 05/12/2022   • INFLUENZA 10/14/2017, 08/31/2018, 10/22/2021, 09/28/2022   • Influenza Split High Dose Preservative Free IM 11/09/2019   • Pneumococcal Polysaccharide PPV23 07/10/2020   • Tdap 04/30/2008, 08/04/2017      Health Maintenance:         Topic Date Due   • Colorectal Cancer Screening  08/16/2029   • Hepatitis C Screening  Completed         Topic Date Due   • Pneumococcal Vaccine: 65+ Years (2 - PCV) 07/10/2021   • COVID-19 Vaccine (6 - Pfizer series) 01/28/2023   • Influenza Vaccine (1) 09/01/2023      Medicare Screening Tests and Risk Assessments:     Annual Wellness Visit  No results found.      Physical Exam:     /72 (BP Location: Left arm, Patient Position: Sitting, Cuff Size: Large)   Pulse 86   Temp 98.7 °F (37.1 °C) (Tympanic)   Resp 16   Ht 5' 11" (1.803 m)   Wt 127 kg (280 lb)   SpO2 98%   BMI 39.05 kg/m²     Physical Exam     Brant Reyes MD

## 2023-07-31 NOTE — PATIENT INSTRUCTIONS
Medicare Preventive Visit Patient Instructions  Thank you for completing your Welcome to Medicare Visit or Medicare Annual Wellness Visit today. Your next wellness visit will be due in one year (7/31/2024). The screening/preventive services that you may require over the next 5-10 years are detailed below. Some tests may not apply to you based off risk factors and/or age. Screening tests ordered at today's visit but not completed yet may show as past due. Also, please note that scanned in results may not display below. Preventive Screenings:  Service Recommendations Previous Testing/Comments   Colorectal Cancer Screening  · Colonoscopy    · Fecal Occult Blood Test (FOBT)/Fecal Immunochemical Test (FIT)  · Fecal DNA/Cologuard Test  · Flexible Sigmoidoscopy Age: 43-73 years old   Colonoscopy: every 10 years (May be performed more frequently if at higher risk)  OR  FOBT/FIT: every 1 year  OR  Cologuard: every 3 years  OR  Sigmoidoscopy: every 5 years  Screening may be recommended earlier than age 39 if at higher risk for colorectal cancer. Also, an individualized decision between you and your healthcare provider will decide whether screening between the ages of 77-80 would be appropriate.  Colonoscopy: 08/16/2019  FOBT/FIT: Not on file  Cologuard: Not on file  Sigmoidoscopy: Not on file          Prostate Cancer Screening Individualized decision between patient and health care provider in men between ages of 53-66   Medicare will cover every 12 months beginning on the day after your 50th birthday PSA: 0.5 ng/mL           Hepatitis C Screening Once for adults born between 1945 and 1965  More frequently in patients at high risk for Hepatitis C Hep C Antibody: 08/17/2018        Diabetes Screening 1-2 times per year if you're at risk for diabetes or have pre-diabetes Fasting glucose: No results in last 5 years (No results in last 5 years)  A1C: No results in last 5 years (No results in last 5 years)      Cholesterol Screening Once every 5 years if you don't have a lipid disorder. May order more often based on risk factors. Lipid panel: 08/06/2022         Other Preventive Screenings Covered by Medicare:  1. Abdominal Aortic Aneurysm (AAA) Screening: covered once if your at risk. You're considered to be at risk if you have a family history of AAA or a male between the age of 70-76 who smoking at least 100 cigarettes in your lifetime. 2. Lung Cancer Screening: covers low dose CT scan once per year if you meet all of the following conditions: (1) Age 48-67; (2) No signs or symptoms of lung cancer; (3) Current smoker or have quit smoking within the last 15 years; (4) You have a tobacco smoking history of at least 20 pack years (packs per day x number of years you smoked); (5) You get a written order from a healthcare provider. 3. Glaucoma Screening: covered annually if you're considered high risk: (1) You have diabetes OR (2) Family history of glaucoma OR (3)  aged 48 and older OR (3)  American aged 72 and older  3. Osteoporosis Screening: covered every 2 years if you meet one of the following conditions: (1) Have a vertebral abnormality; (2) On glucocorticoid therapy for more than 3 months; (3) Have primary hyperparathyroidism; (4) On osteoporosis medications and need to assess response to drug therapy. 5. HIV Screening: covered annually if you're between the age of 14-79. Also covered annually if you are younger than 13 and older than 72 with risk factors for HIV infection. For pregnant patients, it is covered up to 3 times per pregnancy.     Immunizations:  Immunization Recommendations   Influenza Vaccine Annual influenza vaccination during flu season is recommended for all persons aged >= 6 months who do not have contraindications   Pneumococcal Vaccine   * Pneumococcal conjugate vaccine = PCV13 (Prevnar 13), PCV15 (Vaxneuvance), PCV20 (Prevnar 20)  * Pneumococcal polysaccharide vaccine = PPSV23 (Pneumovax) Adults 2364 years old: 1-3 doses may be recommended based on certain risk factors  Adults 72 years old: 1-2 doses may be recommended based off what pneumonia vaccine you previously received   Hepatitis B Vaccine 3 dose series if at intermediate or high risk (ex: diabetes, end stage renal disease, liver disease)   Tetanus (Td) Vaccine - COST NOT COVERED BY MEDICARE PART B Following completion of primary series, a booster dose should be given every 10 years to maintain immunity against tetanus. Td may also be given as tetanus wound prophylaxis. Tdap Vaccine - COST NOT COVERED BY MEDICARE PART B Recommended at least once for all adults. For pregnant patients, recommended with each pregnancy. Shingles Vaccine (Shingrix) - COST NOT COVERED BY MEDICARE PART B  2 shot series recommended in those aged 48 and above     Health Maintenance Due:      Topic Date Due   • Colorectal Cancer Screening  08/16/2029   • Hepatitis C Screening  Completed     Immunizations Due:      Topic Date Due   • Pneumococcal Vaccine: 65+ Years (2 - PCV) 07/10/2021   • COVID-19 Vaccine (5 - Pfizer series) 07/07/2022   • Influenza Vaccine (1) 09/01/2023     Advance Directives   What are advance directives? Advance directives are legal documents that state your wishes and plans for medical care. These plans are made ahead of time in case you lose your ability to make decisions for yourself. Advance directives can apply to any medical decision, such as the treatments you want, and if you want to donate organs. What are the types of advance directives? There are many types of advance directives, and each state has rules about how to use them. You may choose a combination of any of the following:  · Living will: This is a written record of the treatment you want. You can also choose which treatments you do not want, which to limit, and which to stop at a certain time. This includes surgery, medicine, IV fluid, and tube feedings. · Durable power of  for healthcare [facility]): This is a written record that states who you want to make healthcare choices for you when you are unable to make them for yourself. This person, called a proxy, is usually a family member or a friend. You may choose more than 1 proxy. · Do not resuscitate (DNR) order:  A DNR order is used in case your heart stops beating or you stop breathing. It is a request not to have certain forms of treatment, such as CPR. A DNR order may be included in other types of advance directives. · Medical directive: This covers the care that you want if you are in a coma, near death, or unable to make decisions for yourself. You can list the treatments you want for each condition. Treatment may include pain medicine, surgery, blood transfusions, dialysis, IV or tube feedings, and a ventilator (breathing machine). · Values history: This document has questions about your views, beliefs, and how you feel and think about life. This information can help others choose the care that you would choose. Why are advance directives important? An advance directive helps you control your care. Although spoken wishes may be used, it is better to have your wishes written down. Spoken wishes can be misunderstood, or not followed. Treatments may be given even if you do not want them. An advance directive may make it easier for your family to make difficult choices about your care. © Copyright [employer] 2018 Information is for End User's use only and may not be sold, redistributed or otherwise used for commercial purposes. All illustrations and images included in CareNotes® are the copyrighted property of [employer] Inc[employer] or [facility] Preventive Visit Patient Instructions  Thank you for completing your Welcome to Medicare Visit or Medicare Annual Wellness Visit today. Your next wellness visit will be due in one year (7/31/2024).   The screening/preventive services that you may require over the next 5-10 years are detailed below. Some tests may not apply to you based off risk factors and/or age. Screening tests ordered at today's visit but not completed yet may show as past due. Also, please note that scanned in results may not display below. Preventive Screenings:  Service Recommendations Previous Testing/Comments   Colorectal Cancer Screening  · Colonoscopy    · Fecal Occult Blood Test (FOBT)/Fecal Immunochemical Test (FIT)  · Fecal DNA/Cologuard Test  · Flexible Sigmoidoscopy Age: 43-73 years old   Colonoscopy: every 10 years (May be performed more frequently if at higher risk)  OR  FOBT/FIT: every 1 year  OR  Cologuard: every 3 years  OR  Sigmoidoscopy: every 5 years  Screening may be recommended earlier than age 39 if at higher risk for colorectal cancer. Also, an individualized decision between you and your healthcare provider will decide whether screening between the ages of 77-80 would be appropriate. Colonoscopy: 08/16/2019  FOBT/FIT: Not on file  Cologuard: Not on file  Sigmoidoscopy: Not on file    Screening Current     Prostate Cancer Screening Individualized decision between patient and health care provider in men between ages of 53-66   Medicare will cover every 12 months beginning on the day after your 50th birthday PSA: 0.5 ng/mL     Screening Current     Hepatitis C Screening Once for adults born between 1945 and 1965  More frequently in patients at high risk for Hepatitis C Hep C Antibody: 08/17/2018    Screening Current   Diabetes Screening 1-2 times per year if you're at risk for diabetes or have pre-diabetes Fasting glucose: No results in last 5 years (No results in last 5 years)  A1C: No results in last 5 years (No results in last 5 years)  Screening Current   Cholesterol Screening Once every 5 years if you don't have a lipid disorder. May order more often based on risk factors.  Lipid panel: 08/06/2022  Screening Current      Other Preventive Screenings Covered by Medicare:  6. Abdominal Aortic Aneurysm (AAA) Screening: covered once if your at risk. You're considered to be at risk if you have a family history of AAA or a male between the age of 70-76 who smoking at least 100 cigarettes in your lifetime. 7. Lung Cancer Screening: covers low dose CT scan once per year if you meet all of the following conditions: (1) Age 48-67; (2) No signs or symptoms of lung cancer; (3) Current smoker or have quit smoking within the last 15 years; (4) You have a tobacco smoking history of at least 20 pack years (packs per day x number of years you smoked); (5) You get a written order from a healthcare provider. 8. Glaucoma Screening: covered annually if you're considered high risk: (1) You have diabetes OR (2) Family history of glaucoma OR (3)  aged 48 and older OR (3)  American aged 72 and older  5. Osteoporosis Screening: covered every 2 years if you meet one of the following conditions: (1) Have a vertebral abnormality; (2) On glucocorticoid therapy for more than 3 months; (3) Have primary hyperparathyroidism; (4) On osteoporosis medications and need to assess response to drug therapy. 10. HIV Screening: covered annually if you're between the age of 14-79. Also covered annually if you are younger than 13 and older than 72 with risk factors for HIV infection. For pregnant patients, it is covered up to 3 times per pregnancy.     Immunizations:  Immunization Recommendations   Influenza Vaccine Annual influenza vaccination during flu season is recommended for all persons aged >= 6 months who do not have contraindications   Pneumococcal Vaccine   * Pneumococcal conjugate vaccine = PCV13 (Prevnar 13), PCV15 (Vaxneuvance), PCV20 (Prevnar 20)  * Pneumococcal polysaccharide vaccine = PPSV23 (Pneumovax) Adults 20-63 years old: 1-3 doses may be recommended based on certain risk factors  Adults 72 years old: 1-2 doses may be recommended based off what pneumonia vaccine you previously received   Hepatitis B Vaccine 3 dose series if at intermediate or high risk (ex: diabetes, end stage renal disease, liver disease)   Tetanus (Td) Vaccine - COST NOT COVERED BY MEDICARE PART B Following completion of primary series, a booster dose should be given every 10 years to maintain immunity against tetanus. Td may also be given as tetanus wound prophylaxis. Tdap Vaccine - COST NOT COVERED BY MEDICARE PART B Recommended at least once for all adults. For pregnant patients, recommended with each pregnancy. Shingles Vaccine (Shingrix) - COST NOT COVERED BY MEDICARE PART B  2 shot series recommended in those aged 48 and above     Health Maintenance Due:      Topic Date Due   • Colorectal Cancer Screening  08/16/2029   • Hepatitis C Screening  Completed     Immunizations Due:      Topic Date Due   • Pneumococcal Vaccine: 65+ Years (2 - PCV) 07/10/2021   • COVID-19 Vaccine (6 - Pfizer series) 01/28/2023   • Influenza Vaccine (1) 09/01/2023     Advance Directives   What are advance directives? Advance directives are legal documents that state your wishes and plans for medical care. These plans are made ahead of time in case you lose your ability to make decisions for yourself. Advance directives can apply to any medical decision, such as the treatments you want, and if you want to donate organs. What are the types of advance directives? There are many types of advance directives, and each state has rules about how to use them. You may choose a combination of any of the following:  · Living will: This is a written record of the treatment you want. You can also choose which treatments you do not want, which to limit, and which to stop at a certain time. This includes surgery, medicine, IV fluid, and tube feedings. · Durable power of  for healthcare [facility]):   This is a written record that states who you want to make healthcare choices for you when you are unable to make them for yourself. This person, called a proxy, is usually a family member or a friend. You may choose more than 1 proxy. · Do not resuscitate (DNR) order:  A DNR order is used in case your heart stops beating or you stop breathing. It is a request not to have certain forms of treatment, such as CPR. A DNR order may be included in other types of advance directives. · Medical directive: This covers the care that you want if you are in a coma, near death, or unable to make decisions for yourself. You can list the treatments you want for each condition. Treatment may include pain medicine, surgery, blood transfusions, dialysis, IV or tube feedings, and a ventilator (breathing machine). · Values history: This document has questions about your views, beliefs, and how you feel and think about life. This information can help others choose the care that you would choose. Why are advance directives important? An advance directive helps you control your care. Although spoken wishes may be used, it is better to have your wishes written down. Spoken wishes can be misunderstood, or not followed. Treatments may be given even if you do not want them. An advance directive may make it easier for your family to make difficult choices about your care. Weight Management   Why it is important to manage your weight:  Being overweight increases your risk of health conditions such as heart disease, high blood pressure, type 2 diabetes, and certain types of cancer. It can also increase your risk for osteoarthritis, sleep apnea, and other respiratory problems. Aim for a slow, steady weight loss. Even a small amount of weight loss can lower your risk of health problems. How to lose weight safely:  A safe and healthy way to lose weight is to eat fewer calories and get regular exercise. You can lose up about 1 pound a week by decreasing the number of calories you eat by 500 calories each day.    Healthy meal plan for weight management:  A healthy meal plan includes a variety of foods, contains fewer calories, and helps you stay healthy. A healthy meal plan includes the following:  · Eat whole-grain foods more often. A healthy meal plan should contain fiber. Fiber is the part of grains, fruits, and vegetables that is not broken down by your body. Whole-grain foods are healthy and provide extra fiber in your diet. Some examples of whole-grain foods are whole-wheat breads and pastas, oatmeal, brown rice, and bulgur. · Eat a variety of vegetables every day. Include dark, leafy greens such as spinach, kale, anju greens, and mustard greens. Eat yellow and orange vegetables such as carrots, sweet potatoes, and winter squash. · Eat a variety of fruits every day. Choose fresh or canned fruit (canned in its own juice or light syrup) instead of juice. Fruit juice has very little or no fiber. · Eat low-fat dairy foods. Drink fat-free (skim) milk or 1% milk. Eat fat-free yogurt and low-fat cottage cheese. Try low-fat cheeses such as mozzarella and other reduced-fat cheeses. · Choose meat and other protein foods that are low in fat. Choose beans or other legumes such as split peas or lentils. Choose fish, skinless poultry (chicken or turkey), or lean cuts of red meat (beef or pork). Before you cook meat or poultry, cut off any visible fat. · Use less fat and oil. Try baking foods instead of frying them. Add less fat, such as margarine, sour cream, regular salad dressing and mayonnaise to foods. Eat fewer high-fat foods. Some examples of high-fat foods include french fries, doughnuts, ice cream, and cakes. · Eat fewer sweets. Limit foods and drinks that are high in sugar. This includes candy, cookies, regular soda, and sweetened drinks. Exercise:  Exercise at least 30 minutes per day on most days of the week. Some examples of exercise include walking, biking, dancing, and swimming.  You can also fit in more physical activity by taking the stairs instead of the elevator or parking farther away from stores. Ask your healthcare provider about the best exercise plan for you. © Copyright [employer] 2018 Information is for End User's use only and may not be sold, redistributed or otherwise used for commercial purposes.  All illustrations and images included in CareNotes® are the copyrighted property of [employer] or [address] [place] [address]

## 2023-08-08 LAB
ALBUMIN SERPL-MCNC: 3.8 G/DL (ref 3.6–5.1)
ALBUMIN/GLOB SERPL: 2.1 (CALC) (ref 1–2.5)
ALP SERPL-CCNC: 73 U/L (ref 35–144)
ALT SERPL-CCNC: 23 U/L (ref 9–46)
APPEARANCE UR: CLEAR
AST SERPL-CCNC: 21 U/L (ref 10–35)
BACTERIA UR QL AUTO: NORMAL /HPF
BASOPHILS # BLD AUTO: 42 CELLS/UL (ref 0–200)
BASOPHILS NFR BLD AUTO: 0.8 %
BILIRUB SERPL-MCNC: 0.7 MG/DL (ref 0.2–1.2)
BILIRUB UR QL STRIP: NEGATIVE
BUN SERPL-MCNC: 22 MG/DL (ref 7–25)
BUN/CREAT SERPL: ABNORMAL (CALC) (ref 6–22)
CALCIUM SERPL-MCNC: 9.4 MG/DL (ref 8.6–10.3)
CHLORIDE SERPL-SCNC: 103 MMOL/L (ref 98–110)
CHOLEST SERPL-MCNC: 128 MG/DL
CHOLEST/HDLC SERPL: 2 (CALC)
CO2 SERPL-SCNC: 30 MMOL/L (ref 20–32)
COLOR UR: YELLOW
CREAT SERPL-MCNC: 0.93 MG/DL (ref 0.7–1.35)
EOSINOPHIL # BLD AUTO: 382 CELLS/UL (ref 15–500)
EOSINOPHIL NFR BLD AUTO: 7.2 %
ERYTHROCYTE [DISTWIDTH] IN BLOOD BY AUTOMATED COUNT: 12.3 % (ref 11–15)
GFR/BSA.PRED SERPLBLD CYS-BASED-ARV: 89 ML/MIN/1.73M2
GLOBULIN SER CALC-MCNC: 1.8 G/DL (CALC) (ref 1.9–3.7)
GLUCOSE SERPL-MCNC: 95 MG/DL (ref 65–99)
GLUCOSE UR QL STRIP: NEGATIVE
HCT VFR BLD AUTO: 42.7 % (ref 38.5–50)
HDLC SERPL-MCNC: 65 MG/DL
HGB BLD-MCNC: 14.6 G/DL (ref 13.2–17.1)
HGB UR QL STRIP: NEGATIVE
HYALINE CASTS #/AREA URNS LPF: NORMAL /LPF
KETONES UR QL STRIP: NEGATIVE
LDLC SERPL CALC-MCNC: 49 MG/DL (CALC)
LEUKOCYTE ESTERASE UR QL STRIP: NEGATIVE
LYMPHOCYTES # BLD AUTO: 1240 CELLS/UL (ref 850–3900)
LYMPHOCYTES NFR BLD AUTO: 23.4 %
MCH RBC QN AUTO: 31 PG (ref 27–33)
MCHC RBC AUTO-ENTMCNC: 34.2 G/DL (ref 32–36)
MCV RBC AUTO: 90.7 FL (ref 80–100)
MONOCYTES # BLD AUTO: 705 CELLS/UL (ref 200–950)
MONOCYTES NFR BLD AUTO: 13.3 %
NEUTROPHILS # BLD AUTO: 2931 CELLS/UL (ref 1500–7800)
NEUTROPHILS NFR BLD AUTO: 55.3 %
NITRITE UR QL STRIP: NEGATIVE
NONHDLC SERPL-MCNC: 63 MG/DL (CALC)
PH UR STRIP: 7 [PH] (ref 5–8)
PLATELET # BLD AUTO: 193 THOUSAND/UL (ref 140–400)
PMV BLD REES-ECKER: 9.5 FL (ref 7.5–12.5)
POTASSIUM SERPL-SCNC: 4.3 MMOL/L (ref 3.5–5.3)
PROT SERPL-MCNC: 5.6 G/DL (ref 6.1–8.1)
PROT UR QL STRIP: NEGATIVE
PSA SERPL-MCNC: 0.39 NG/ML
RBC # BLD AUTO: 4.71 MILLION/UL (ref 4.2–5.8)
RBC #/AREA URNS HPF: NORMAL /HPF
SODIUM SERPL-SCNC: 138 MMOL/L (ref 135–146)
SP GR UR STRIP: 1.01 (ref 1–1.03)
SQUAMOUS #/AREA URNS HPF: NORMAL /HPF
TRIGL SERPL-MCNC: 49 MG/DL
WBC # BLD AUTO: 5.3 THOUSAND/UL (ref 3.8–10.8)
WBC #/AREA URNS HPF: NORMAL /HPF

## 2023-09-29 PROBLEM — Z00.00 MEDICARE ANNUAL WELLNESS VISIT, SUBSEQUENT: Status: RESOLVED | Noted: 2020-07-10 | Resolved: 2023-09-29

## 2024-01-10 ENCOUNTER — APPOINTMENT (EMERGENCY)
Dept: RADIOLOGY | Facility: HOSPITAL | Age: 69
DRG: 482 | End: 2024-01-10
Payer: COMMERCIAL

## 2024-01-10 ENCOUNTER — HOSPITAL ENCOUNTER (INPATIENT)
Facility: HOSPITAL | Age: 69
LOS: 6 days | Discharge: HOME HEALTH | DRG: 482 | End: 2024-01-16
Attending: EMERGENCY MEDICINE | Admitting: SURGERY
Payer: COMMERCIAL

## 2024-01-10 ENCOUNTER — ANESTHESIA EVENT (INPATIENT)
Dept: PERIOP | Facility: HOSPITAL | Age: 69
DRG: 482 | End: 2024-01-10
Payer: COMMERCIAL

## 2024-01-10 DIAGNOSIS — S72.91XA RIGHT FEMORAL FRACTURE (HCC): Primary | ICD-10-CM

## 2024-01-10 PROBLEM — S72.144A NONDISPLACED INTERTROCHANTERIC FRACTURE OF RIGHT FEMUR, INITIAL ENCOUNTER FOR CLOSED FRACTURE (HCC): Status: ACTIVE | Noted: 2024-01-10
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
## 2024-01-10 LAB
ABO GROUP BLD: NORMAL
ABO GROUP BLD: NORMAL
ALBUMIN SERPL BCP-MCNC: 3.9 G/DL (ref 3.5–5)
ALP SERPL-CCNC: 73 U/L (ref 34–104)
ALT SERPL W P-5'-P-CCNC: 35 U/L (ref 7–52)
ANION GAP SERPL CALCULATED.3IONS-SCNC: 6 MMOL/L
APTT PPP: 27 SECONDS (ref 23–37)
AST SERPL W P-5'-P-CCNC: 32 U/L (ref 13–39)
BASOPHILS # BLD AUTO: 0.03 THOUSANDS/ÂΜL (ref 0–0.1)
BASOPHILS NFR BLD AUTO: 0 % (ref 0–1)
BILIRUB SERPL-MCNC: 0.52 MG/DL (ref 0.2–1)
BLD GP AB SCN SERPL QL: NEGATIVE
BUN SERPL-MCNC: 12 MG/DL (ref 5–25)
CALCIUM SERPL-MCNC: 9.3 MG/DL (ref 8.4–10.2)
CHLORIDE SERPL-SCNC: 101 MMOL/L (ref 96–108)
CO2 SERPL-SCNC: 31 MMOL/L (ref 21–32)
CREAT SERPL-MCNC: 0.8 MG/DL (ref 0.6–1.3)
EOSINOPHIL # BLD AUTO: 0.06 THOUSAND/ÂΜL (ref 0–0.61)
EOSINOPHIL NFR BLD AUTO: 1 % (ref 0–6)
ERYTHROCYTE [DISTWIDTH] IN BLOOD BY AUTOMATED COUNT: 13.1 % (ref 11.6–15.1)
GFR SERPL CREATININE-BSD FRML MDRD: 91 ML/MIN/1.73SQ M
GLUCOSE SERPL-MCNC: 116 MG/DL (ref 65–140)
HCT VFR BLD AUTO: 46 % (ref 36.5–49.3)
HGB BLD-MCNC: 15.2 G/DL (ref 12–17)
IMM GRANULOCYTES # BLD AUTO: 0.04 THOUSAND/UL (ref 0–0.2)
IMM GRANULOCYTES NFR BLD AUTO: 0 % (ref 0–2)
INR PPP: 0.98 (ref 0.84–1.19)
LYMPHOCYTES # BLD AUTO: 0.6 THOUSANDS/ÂΜL (ref 0.6–4.47)
LYMPHOCYTES NFR BLD AUTO: 6 % (ref 14–44)
MCH RBC QN AUTO: 30.7 PG (ref 26.8–34.3)
MCHC RBC AUTO-ENTMCNC: 33 G/DL (ref 31.4–37.4)
MCV RBC AUTO: 93 FL (ref 82–98)
MONOCYTES # BLD AUTO: 0.49 THOUSAND/ÂΜL (ref 0.17–1.22)
MONOCYTES NFR BLD AUTO: 5 % (ref 4–12)
NEUTROPHILS # BLD AUTO: 8.4 THOUSANDS/ÂΜL (ref 1.85–7.62)
NEUTS SEG NFR BLD AUTO: 88 % (ref 43–75)
NRBC BLD AUTO-RTO: 0 /100 WBCS
PLATELET # BLD AUTO: 208 THOUSANDS/UL (ref 149–390)
PMV BLD AUTO: 9.2 FL (ref 8.9–12.7)
POTASSIUM SERPL-SCNC: 3.8 MMOL/L (ref 3.5–5.3)
PROT SERPL-MCNC: 6.3 G/DL (ref 6.4–8.4)
PROTHROMBIN TIME: 13.6 SECONDS (ref 11.6–14.5)
RBC # BLD AUTO: 4.95 MILLION/UL (ref 3.88–5.62)
RH BLD: POSITIVE
RH BLD: POSITIVE
SODIUM SERPL-SCNC: 138 MMOL/L (ref 135–147)
SPECIMEN EXPIRATION DATE: NORMAL
WBC # BLD AUTO: 9.62 THOUSAND/UL (ref 4.31–10.16)

## 2024-01-10 PROCEDURE — 73552 X-RAY EXAM OF FEMUR 2/>: CPT

## 2024-01-10 PROCEDURE — 85730 THROMBOPLASTIN TIME PARTIAL: CPT

## 2024-01-10 PROCEDURE — 99223 1ST HOSP IP/OBS HIGH 75: CPT | Performed by: SURGERY

## 2024-01-10 PROCEDURE — 71045 X-RAY EXAM CHEST 1 VIEW: CPT

## 2024-01-10 PROCEDURE — 86901 BLOOD TYPING SEROLOGIC RH(D): CPT | Performed by: STUDENT IN AN ORGANIZED HEALTH CARE EDUCATION/TRAINING PROGRAM

## 2024-01-10 PROCEDURE — 85025 COMPLETE CBC W/AUTO DIFF WBC: CPT

## 2024-01-10 PROCEDURE — 86850 RBC ANTIBODY SCREEN: CPT | Performed by: STUDENT IN AN ORGANIZED HEALTH CARE EDUCATION/TRAINING PROGRAM

## 2024-01-10 PROCEDURE — 85610 PROTHROMBIN TIME: CPT

## 2024-01-10 PROCEDURE — 86900 BLOOD TYPING SEROLOGIC ABO: CPT | Performed by: STUDENT IN AN ORGANIZED HEALTH CARE EDUCATION/TRAINING PROGRAM

## 2024-01-10 PROCEDURE — 99285 EMERGENCY DEPT VISIT HI MDM: CPT | Performed by: EMERGENCY MEDICINE

## 2024-01-10 PROCEDURE — 99284 EMERGENCY DEPT VISIT MOD MDM: CPT

## 2024-01-10 PROCEDURE — 99223 1ST HOSP IP/OBS HIGH 75: CPT | Performed by: STUDENT IN AN ORGANIZED HEALTH CARE EDUCATION/TRAINING PROGRAM

## 2024-01-10 PROCEDURE — 80053 COMPREHEN METABOLIC PANEL: CPT

## 2024-01-10 PROCEDURE — 36415 COLL VENOUS BLD VENIPUNCTURE: CPT

## 2024-01-10 PROCEDURE — 72170 X-RAY EXAM OF PELVIS: CPT

## 2024-01-10 PROCEDURE — 73560 X-RAY EXAM OF KNEE 1 OR 2: CPT

## 2024-01-10 RX ORDER — ACETAMINOPHEN 325 MG/1
650 TABLET ORAL EVERY 6 HOURS SCHEDULED
Status: DISCONTINUED | OUTPATIENT
Start: 2024-01-10 | End: 2024-01-16 | Stop reason: HOSPADM

## 2024-01-10 RX ORDER — SODIUM CHLORIDE, SODIUM LACTATE, POTASSIUM CHLORIDE, CALCIUM CHLORIDE 600; 310; 30; 20 MG/100ML; MG/100ML; MG/100ML; MG/100ML
4 INJECTION, SOLUTION INTRAVENOUS CONTINUOUS
Status: DISCONTINUED | OUTPATIENT
Start: 2024-01-10 | End: 2024-01-10

## 2024-01-10 RX ORDER — ONDANSETRON 2 MG/ML
4 INJECTION INTRAMUSCULAR; INTRAVENOUS EVERY 4 HOURS PRN
Status: DISCONTINUED | OUTPATIENT
Start: 2024-01-10 | End: 2024-01-16 | Stop reason: HOSPADM

## 2024-01-10 RX ORDER — LIDOCAINE 50 MG/G
1 PATCH TOPICAL DAILY
Status: DISCONTINUED | OUTPATIENT
Start: 2024-01-10 | End: 2024-01-10

## 2024-01-10 RX ORDER — OXYCODONE HYDROCHLORIDE 5 MG/1
5 TABLET ORAL EVERY 4 HOURS PRN
Status: DISCONTINUED | OUTPATIENT
Start: 2024-01-10 | End: 2024-01-10

## 2024-01-10 RX ORDER — SODIUM CHLORIDE, SODIUM GLUCONATE, SODIUM ACETATE, POTASSIUM CHLORIDE, MAGNESIUM CHLORIDE, SODIUM PHOSPHATE, DIBASIC, AND POTASSIUM PHOSPHATE .53; .5; .37; .037; .03; .012; .00082 G/100ML; G/100ML; G/100ML; G/100ML; G/100ML; G/100ML; G/100ML
100 INJECTION, SOLUTION INTRAVENOUS CONTINUOUS
Status: DISCONTINUED | OUTPATIENT
Start: 2024-01-10 | End: 2024-01-10

## 2024-01-10 RX ORDER — SODIUM CHLORIDE, SODIUM LACTATE, POTASSIUM CHLORIDE, CALCIUM CHLORIDE 600; 310; 30; 20 MG/100ML; MG/100ML; MG/100ML; MG/100ML
100 INJECTION, SOLUTION INTRAVENOUS CONTINUOUS
Status: DISCONTINUED | OUTPATIENT
Start: 2024-01-10 | End: 2024-01-11

## 2024-01-10 RX ORDER — HYDROMORPHONE HCL IN WATER/PF 6 MG/30 ML
0.2 PATIENT CONTROLLED ANALGESIA SYRINGE INTRAVENOUS EVERY 2 HOUR PRN
Status: DISCONTINUED | OUTPATIENT
Start: 2024-01-10 | End: 2024-01-16 | Stop reason: HOSPADM

## 2024-01-10 RX ORDER — AMOXICILLIN 250 MG
1 CAPSULE ORAL
Status: DISCONTINUED | OUTPATIENT
Start: 2024-01-10 | End: 2024-01-16 | Stop reason: HOSPADM

## 2024-01-10 RX ORDER — LIDOCAINE 50 MG/G
1 PATCH TOPICAL ONCE
Status: COMPLETED | OUTPATIENT
Start: 2024-01-10 | End: 2024-01-11

## 2024-01-10 RX ORDER — OXYCODONE HYDROCHLORIDE 5 MG/1
5 TABLET ORAL EVERY 4 HOURS PRN
Status: DISCONTINUED | OUTPATIENT
Start: 2024-01-10 | End: 2024-01-16 | Stop reason: HOSPADM

## 2024-01-10 RX ORDER — ACETAMINOPHEN 325 MG/1
650 TABLET ORAL EVERY 4 HOURS PRN
Status: DISCONTINUED | OUTPATIENT
Start: 2024-01-10 | End: 2024-01-10

## 2024-01-10 RX ORDER — ENOXAPARIN SODIUM 100 MG/ML
30 INJECTION SUBCUTANEOUS EVERY 12 HOURS
Status: DISCONTINUED | OUTPATIENT
Start: 2024-01-10 | End: 2024-01-16 | Stop reason: HOSPADM

## 2024-01-10 RX ADMIN — ACETAMINOPHEN 650 MG: 325 TABLET, FILM COATED ORAL at 19:48

## 2024-01-10 RX ADMIN — ENOXAPARIN SODIUM 30 MG: 30 INJECTION SUBCUTANEOUS at 16:47

## 2024-01-10 RX ADMIN — LIDOCAINE 5% 1 PATCH: 700 PATCH TOPICAL at 13:35

## 2024-01-10 RX ADMIN — OXYCODONE HYDROCHLORIDE 5 MG: 5 TABLET ORAL at 21:55

## 2024-01-10 RX ADMIN — SODIUM CHLORIDE, SODIUM LACTATE, POTASSIUM CHLORIDE, AND CALCIUM CHLORIDE 1000 ML: .6; .31; .03; .02 INJECTION, SOLUTION INTRAVENOUS at 14:39

## 2024-01-10 RX ADMIN — SODIUM CHLORIDE, SODIUM LACTATE, POTASSIUM CHLORIDE, AND CALCIUM CHLORIDE 100 ML/HR: .6; .31; .03; .02 INJECTION, SOLUTION INTRAVENOUS at 16:49

## 2024-01-10 RX ADMIN — TRANEXAMIC ACID 1250 MG: 100 INJECTION, SOLUTION INTRAVENOUS at 16:19

## 2024-01-10 NOTE — H&P
H&P - Trauma   Kemal Huddleston 68 y.o. male MRN: [id number]  Unit/Bed#: ED-42 Encounter: [id number]    Trauma Alert: Other consult from ED    Model of Arrival: Ambulance    Trauma Team: [facility]  Consultants:     Orthopedics: routine consult; Epic consult order placed;, AP texted at 1344    Assessment/Plan   Active Problems / Assessment:   Mechanical fall  Right intertrochanteric femur fracture     Plan:   Admit to trauma service  Follow up pending CBC, CMP, PT, PTT, type/screen  TXA 10 mg/kg now and intraopertively  Consult ortho - OR tomorrow, 1/11/24  Non-weightbearing to right lower extremity for now  Neurovascular checks q 4 hours  Multimodal analgesia, bowel regimen  Holding home Amlodipine and Olmesartan-HCTZ until after OR  Incentive spirometer  PT/OT, CM for dispo planning  Gtts; LR infusion @100cc/hr  DVT ppx: Lovenox and SCDs  Diet: Regular diet for now. NPO @ midnight      History of Present Illness     Chief Complaint: R hip pain  Mechanism:Fall     HPI:    Kemal Huddleston is a 68 y.o. male with hypertension presenting the ED for right hip pain after mechanical fall.  Patient states he was walking outside his house today when he tripped on his own feet, landed directly on his right hip causing pain and was unable to get up on his own until EMS arrived approximately 15 minutes later.  Reports mild right hip pain, otherwise feels well.  No anticoagulants or aspirin use.  No head strike or LOC.  Denies chest pain, shortness of breath, abdominal pain, urinary symptoms, URI symptoms, fever, chills, weakness, paresthesias, slurred speech, dizziness.    Review of Systems   Musculoskeletal:         R hip pain   All other systems reviewed and are negative.    12-point, complete review of systems was reviewed and negative except as stated above.     Historical Information     Past Medical History:   Diagnosis Date    Hypertension      Past Surgical History:   Procedure Laterality Date    COLONOSCOPY   04/27/2015    TONSILLECTOMY      VASECTOMY          Social History     Tobacco Use    Smoking status: Never    Smokeless tobacco: Never   Vaping Use    Vaping status: Never Used   Substance Use Topics    Alcohol use: Yes     Alcohol/week: 4.0 standard drinks of alcohol     Types: 1 Glasses of wine, 1 Cans of beer, 1 Shots of liquor, 1 Standard drinks or equivalent per week     Comment: occasional    Drug use: Never     Immunization History   Administered Date(s) Administered    COVID-19 PFIZER VACCINE 0.3 ML IM 04/03/2021, 04/27/2021, 11/15/2021    COVID-19 Pfizer Vac BIVALENT Hemal-sucrose 12 Yr+ IM 09/28/2022    COVID-19 Pfizer vac (Hemal-sucrose, gray cap) 12 yr+ IM 05/12/2022    Hep A, adult 01/15/1996    Hep B, adult 01/15/1997, 02/14/1997, 07/07/1997    INFLUENZA 10/14/2017, 08/31/2018, 10/22/2021, 09/28/2022    Influenza Split High Dose Preservative Free IM 11/09/2019    Pneumococcal Polysaccharide PPV23 07/10/2020    Tdap 10/21/1996, 04/30/2008, 08/04/2017     Last Tetanus: 8/4/2017  Family History: Non-contributory    1. Before the illness or injury that brought you to the Emergency, did you need someone to help you on a regular basis? 0=No   2. Since the illness or injury that brought you to the Emergency, have you needed more help than usual to take care of yourself? 0=No   3. Have you been hospitalized for one or more nights during the past 6 months (excluding a stay in the Emergency Department)? 0=No   4. In general, do you see well? 0=Yes   5. In general, do you have serious problems with your memory? 0=No   6. Do you take more than three different medications everyday? 0=No   TOTAL   0     Did you order a geriatric consult if the score was 2 or greater?: no     Meds/Allergies   current meds:   Current Facility-Administered Medications   Medication Dose Route Frequency    acetaminophen (TYLENOL) tablet 650 mg  650 mg Oral Q6H MARYJO    enoxaparin (LOVENOX) subcutaneous injection 30 mg  30 mg Subcutaneous  Q12H    HYDROmorphone HCl (DILAUDID) injection 0.2 mg  0.2 mg Intravenous Q2H PRN    lactated ringers bolus 1,000 mL  1,000 mL Intravenous Once    lactated ringers infusion  100 mL/hr Intravenous Continuous    lidocaine (LIDODERM) 5 % patch 1 patch  1 patch Topical Once    naloxone (NARCAN) 0.04 mg/mL syringe 0.04 mg  0.04 mg Intravenous Q1MIN PRN    ondansetron (ZOFRAN) injection 4 mg  4 mg Intravenous Q4H PRN    oxyCODONE (ROXICODONE) split tablet 2.5 mg  2.5 mg Oral Q4H PRN    Or    oxyCODONE (ROXICODONE) IR tablet 5 mg  5 mg Oral Q4H PRN    senna-docusate sodium (SENOKOT S) 8.6-50 mg per tablet 1 tablet  1 tablet Oral HS    tranexamic Acid 1,250 mg in sodium chloride 0.9 % 100 mL IVPB  1,250 mg Intravenous Once      No Known Allergies    Objective   Initial Vitals:   Temperature: 98 °F (36.7 °C) (01/10/24 1151)  Pulse: 68 (01/10/24 1151)  Respirations: 18 (01/10/24 1151)  Blood Pressure: 131/70 (01/10/24 1151)    Primary Survey:   Airway:        Status: patent;        Pre-hospital Interventions: none        Hospital Interventions: none  Breathing:        Pre-hospital Interventions: none       Effort: normal       Right breath sounds: normal       Left breath sounds: normal  Circulation:        Rhythm: regular       Rate: regular   Right Pulses Left Pulses    R radial: 2+  R femoral: 2+  R pedal: 2+     L radial: 2+  L femoral: 2+  L pedal: 2+       Disability:        GCS: Eye: 4; Verbal: 5 Motor: 6 Total: 15       Right Pupil: 4 mm;  round;  reactive         Left Pupil:  4 mm;  round;  reactive      R Motor Strength L Motor Strength    R : 5/5  R dorsiflex: 5/5  R plantarflex: 5/5 L : 5/5  L dorsiflex: 5/5  L plantarflex: 5/5        Sensory:  No sensory deficit  Exposure:       Completed: Yes      Secondary Survey:  Physical Exam  Constitutional:       Appearance: Normal appearance.   HENT:      Nose: Nose normal.      Mouth/Throat:      Mouth: Mucous membranes are moist.      Pharynx: Oropharynx is  clear.   Eyes:      Extraocular Movements: Extraocular movements intact.      Conjunctiva/sclera: Conjunctivae normal.      Pupils: Pupils are equal, round, and reactive to light.   Neck:      Trachea: Trachea and phonation normal.   Cardiovascular:      Rate and Rhythm: Normal rate and regular rhythm.      Pulses: Normal pulses.           Radial pulses are 2+ on the right side and 2+ on the left side.        Femoral pulses are 2+ on the right side and 2+ on the left side.       Dorsalis pedis pulses are 2+ on the right side and 2+ on the left side.        Posterior tibial pulses are 2+ on the right side and 2+ on the left side.      Heart sounds: Normal heart sounds, S1 normal and S2 normal.      Comments: Bilateral venous insufficiency noted  Pulmonary:      Effort: Pulmonary effort is normal.      Breath sounds: Normal breath sounds and air entry.   Abdominal:      General: Abdomen is flat. Bowel sounds are normal.      Palpations: Abdomen is soft.   Musculoskeletal:      Cervical back: Full passive range of motion without pain, normal range of motion and neck supple.      Right lower le+ Pitting Edema present.      Left lower le+ Pitting Edema present.      Comments: RLE: shortened, externally rotated. Mild tenderness to R lateral hip. Soft compartments. DP and PT pulses 2+. Normal sensation throughout. 5/5 strength with dorsiflexion and plantarflexion. Decreased ROM with hip flexion and extension due to pain.   No midline cervical, thoracic or lumbar tenderness or step-offs.  Moving other 3 extremities spontaneously and without pain, normal range of motion, neurovascularly intact.  Pelvis is stable.   Skin:     General: Skin is warm.      Capillary Refill: Capillary refill takes less than 2 seconds.   Neurological:      Mental Status: He is alert and oriented to person, place, and time.      GCS: GCS eye subscore is 4. GCS verbal subscore is 5. GCS motor subscore is 6.      Cranial Nerves: Cranial  nerves 2-12 are intact.      Sensory: Sensation is intact.      Coordination: Coordination is intact.         Invasive Devices       Peripheral Intravenous Line  Duration             Peripheral IV 01/10/24 Right Antecubital <1 day                  Lab Results: I have personally reviewed all pertinent laboratory/test results 01/10/24 and in the preceding 24 hours.  Recent Labs     01/10/24  1347   WBC 9.62   HGB 15.2   HCT 46.0      SODIUM 138   K 3.8      CO2 31   BUN 12   CREATININE 0.80   GLUC 116   AST 32   ALT 35   ALB 3.9   TBILI 0.52   ALKPHOS 73   PTT 27   INR 0.98       Imaging Results: I have personally reviewed pertinent images saved in PACS. CT scan findings (and other pertinent positive findings on images) were discussed with radiology. My interpretation of the images/reports are as follows:  Chest Xray(s): negative for acute findings   FAST exam(s): N/A   CT Scan(s): N/A   Additional Xray(s): XR R femur, XR Pelvis:  nondisplaced intertrochanteric right femoral fracture.   XR R knee: arthritis, no fracture.     Other Studies: labs    Results Reviewed       Procedure Component Value Units Date/Time    Protime-INR [[id number]]  (Normal) Collected: 01/10/24 1347    Lab Status: Final result Specimen: Blood from Arm, Right Updated: 01/10/24 1425     Protime 13.6 seconds      INR 0.98    APTT [[id number]]  (Normal) Collected: 01/10/24 1347    Lab Status: Final result Specimen: Blood from Arm, Right Updated: 01/10/24 1425     PTT 27 seconds     Comprehensive metabolic panel [[id number]]  (Abnormal) Collected: 01/10/24 1347    Lab Status: Final result Specimen: Blood from Arm, Right Updated: 01/10/24 1423     Sodium 138 mmol/L      Potassium 3.8 mmol/L      Chloride 101 mmol/L      CO2 31 mmol/L      ANION GAP 6 mmol/L      BUN 12 mg/dL      Creatinine 0.80 mg/dL      Glucose 116 mg/dL      Calcium 9.3 mg/dL      AST 32 U/L      ALT 35 U/L      Alkaline Phosphatase 73 U/L      Total Protein 6.3 g/dL       Albumin 3.9 g/dL      Total Bilirubin 0.52 mg/dL      eGFR 91 ml/min/1.73sq m     Narrative:      National Kid[employer] guidelines for Chronic Kidney Disease (CKD):     Stage 1 with normal or high GFR (GFR > 90 mL/min/1.73 square meters)    Stage 2 Mild CKD (GFR = 60-89 mL/min/1.73 square meters)    Stage 3A Moderate CKD (GFR = 45-59 mL/min/1.73 square meters)    Stage 3B Moderate CKD (GFR = 30-44 mL/min/1.73 square meters)    Stage 4 Severe CKD (GFR = 15-29 mL/min/1.73 square meters)    Stage 5 End Stage CKD (GFR <15 mL/min/1.73 square meters)  Note: GFR calculation is accurate only with a steady state creatinine    CBC and differential [[id number]]  (Abnormal) Collected: 01/10/24 1347    Lab Status: Final result Specimen: Blood from Arm, Right Updated: 01/10/24 1408     WBC 9.62 Thousand/uL      RBC 4.95 Million/uL      Hemoglobin 15.2 g/dL      Hematocrit 46.0 %      MCV 93 fL      MCH 30.7 pg      MCHC 33.0 g/dL      RDW 13.1 %      MPV 9.2 fL      Platelets 208 Thousands/uL      nRBC 0 /100 WBCs      Neutrophils Relative 88 %      Immat GRANS % 0 %      Lymphocytes Relative 6 %      Monocytes Relative 5 %      Eosinophils Relative 1 %      Basophils Relative 0 %      Neutrophils Absolute 8.40 Thousands/µL      Immature Grans Absolute 0.04 Thousand/uL      Lymphocytes Absolute 0.60 Thousands/µL      Monocytes Absolute 0.49 Thousand/µL      Eosinophils Absolute 0.06 Thousand/µL      Basophils Absolute 0.03 Thousands/µL           XR femur 2 views RIGHT   ED Interpretation by Bird Bobo MD (01/10 1315)   Image was independently interpreted by myself and showed intertrochanteric fracture of the Right femoral neck.        Final Result by Florin Cohen MD (01/10 6090)      Acute essentially nondisplaced right intertrochanteric femoral fracture            Workstation performed: [id number]         XR pelvis ap only 1 or 2 vw   ED Interpretation by Bird Bobo MD (01/10 7445)   Image was  independently interpreted by myself and showed intertrochanteric fracture of the Right femoral neck.        Final Result by Florin Cohen MD (01/10 1337)      Acute essentially nondisplaced intertrochanteric right femoral fracture. Pelvis appears intact      Workstation performed: [id number]         XR chest 1 view   ED Interpretation by Bird Bobo MD (01/10 1315)   Image was independently interpreted by myself and showed no acute concerns of cardiopulmonary disease at this time.        Final Result by Florin Cohen MD (01/10 1340)      No acute cardiopulmonary disease.                  Workstation performed: [id number]         XR knee 1 or 2 vw right   ED Interpretation by Bird Bobo MD (01/10 1315)   Image was independently interpreted by myself and showed no acute concerns or fractures at this time.        Final Result by Florin Cohen MD (01/10 1335)      Arthritis. No fracture.            Workstation performed: [id number]             Code Status: Level 1 - Full Code  Advance Directive and Living Will:      Power of :    POLST:    I have spent 30 minutes with Patient and family today in which greater than 50% of this time was spent in counseling/coordination of care regarding Diagnostic results, Prognosis, Risks and benefits of tx options, Instructions for management, Patient and family education, Importance of tx compliance, Risk factor reductions, Impressions, Counseling / Coordination of care, Documenting in the medical record, Reviewing / ordering tests, medicine, procedures  , Obtaining or reviewing history  , and Communicating with other healthcare professionals .

## 2024-01-10 NOTE — CONSULTS
Orthopedics   Kemal Huddleston 68 y.o. male MRN: [id number]  Unit/Bed#: AN XRAY      Chief Complaint:   right hip pain    HPI:   68 y.o. male community ambulator with underlying hypertension status post fall while walking in his yard this morning stepping down off his back patio onto the right hip. He reports isolated right hip pain alone, and no new pain in any other location. He does report chronic bilateral knee pain at baseline, but no new or different pain. No upper extremity pain. No new left lower extremity pain. No numbness/tingling. He has been found to have a right hip fracture for which orthopedics has been engaged.     Review Of Systems:   Skin: Normal  Neuro: See HPI  Musculoskeletal: See HPI  14 point review of systems negative except as stated above     Past Medical History:   Past Medical History:   Diagnosis Date    Hypertension        Past Surgical History:   Past Surgical History:   Procedure Laterality Date    COLONOSCOPY  04/27/2015    TONSILLECTOMY      VASECTOMY         Family History:  Family history reviewed and non-contributory  Family History   Problem Relation Age of Onset    Cancer Mother     Hypertension Father        Social History:  Social History     Socioeconomic History    Marital status: /Civil Union     Spouse name: None    Number of children: None    Years of education: None    Highest education level: None   Occupational History    None   Tobacco Use    Smoking status: Never    Smokeless tobacco: Never   Vaping Use    Vaping status: Never Used   Substance and Sexual Activity    Alcohol use: Yes     Alcohol/week: 4.0 standard drinks of alcohol     Types: 1 Glasses of wine, 1 Cans of beer, 1 Shots of liquor, 1 Standard drinks or equivalent per week     Comment: occasional    Drug use: Never    Sexual activity: None   Other Topics Concern    None   Social History Narrative    None     Social Determinants of Health     Financial Resource Strain: Low Risk  (7/31/2023)    Overall  Financial Resource Strain (CARDIA)     Difficulty of Paying Living Expenses: Not hard at all   Food Insecurity: Not on file   Transportation Needs: No Transportation Needs (7/31/2023)    PRAPARE - Transportation     Lack of Transportation (Medical): No     Lack of Transportation (Non-Medical): No   Physical Activity: Not on file   Stress: Not on file   Social Connections: Not on file   Intimate Partner Violence: Not on file   Housing Stability: Not on file       Allergies:   No Known Allergies        Labs:  0   Lab Value Date/Time    HCT 46.0 01/10/2024 1347    HCT 42.7 08/07/2023 0711    HCT 47.1 08/06/2022 0703    HCT 44.2 08/02/2021 0801    HGB 15.2 01/10/2024 1347    HGB 14.6 08/07/2023 0711    HGB 15.5 08/06/2022 0703    HGB 14.6 08/02/2021 0801    WBC 9.62 01/10/2024 1347    WBC 5.3 08/07/2023 0711    WBC 5.4 08/06/2022 0703    WBC 6.6 08/02/2021 0801       Meds:    Current Facility-Administered Medications:     acetaminophen (TYLENOL) tablet 650 mg, 650 mg, Oral, Q6H [facility], Ema Burch DO    enoxaparin (LOVENOX) subcutaneous injection 30 mg, 30 mg, Subcutaneous, Q12H, Ema Burch DO    Nursing Communication Measure and document PVi every 4 hours until patient goes to the OR., , , Until Discontinued **AND** Nursing Communication Notify physician if SpO2 <92% and stop IVF infusion., , , Until Discontinued **AND** Nursing Communication Check SpHb q4h and notify provider if <8., , , Until Discontinued **AND** Nursing Communication Obtain vital signs, PVi, and SpHb immediately prior to transfer to operating room., , , Until Discontinued **AND** lactated ringers bolus 1,000 mL, 1,000 mL, Intravenous, Once **AND** lactated ringers infusion, 4 mL/kg/hr (Ideal), Intravenous, Continuous, Ema Burch DO    lidocaine (LIDODERM) 5 % patch 1 patch, 1 patch, Topical, Once, Bird Bobo MD, 1 patch at 01/10/24 1335    oxyCODONE (ROXICODONE) IR tablet 5 mg, 5 mg, Oral, Q4H PRN, Ema Burch DO    oxyCODONE  "(ROXICODONE) split tablet 2.5 mg, 2.5 mg, Oral, Q4H PRN, Ema Burch DO    tranexamic Acid 1,250 mg in sodium chloride 0.9 % 100 mL IVPB, 1,250 mg, Intravenous, Once, Ema Burch DO    Current Outpatient Medications:     amLODIPine (NORVASC) 5 mg tablet, TAKE 1 TABLET BY MOUTH  DAILY, Disp: 100 tablet, Rfl: 2    olmesartan-hydrochlorothiazide (BENICAR HCT) 20-12.5 MG per tablet, TAKE 1 TABLET BY MOUTH  DAILY, Disp: 100 tablet, Rfl: 2    Blood Culture:   No results found for: \"BLOODCX\"    Wound Culture:   No results found for: \"WOUNDCULT\"    Ins and Outs:  No intake/output data recorded.          Physical Exam:   /70   Pulse 68   Temp 98 °F (36.7 °C) (Oral)   Resp 18   Wt 125 kg (274 lb 11.1 oz)   SpO2 94%   BMI 38.31 kg/m²   Gen: No acute distress, resting comfortably in bed  HEENT: Eyes clear, moist mucus membranes, hearing intact  Respiratory: No audible wheezing or stridor  Cardiovascular: Well Perfused peripherally, 2+ distal pulse  Abdomen: nondistended, no peritoneal signs  Musculoskeletal: right lower extremity  Skin intact, limb shortened and externally rotated  Tender to palpation over hip  ROM not assessed 2/2 known fracture  Sensation intact L3-S1  Intact ankle dorsi/plantar flexion, EHL/FHL  2+ DP/ PT pulse  Musculature is soft and compressible, no pain with passive stretch  No pain over the right knee, lower leg, foot or ankle.   General MSK  No pain over bilateral clavicles, shoulders, upper arms, elbows, forearms, wrists or hands. ROM full, sensation intact.   No pain over the left hip, femur, knee, lower leg, foot or ankle. ROM full. Sensation intact.     Radiology:   I personally reviewed the films.  Imaging reviewed and discussed with Dr. Vaca.   X-rays pelvis/right femur: right peritrochanteric femur fracture.     _*_*_*_*_*_*_*_*_*_*_*_*_*_*_*_*_*_*_*_*_*_*_*_*_*_*_*_*_*_*_*_*_*_*_*_*_*_*_*_*_*    Assessment:  68 y.o.male status post mechanical fall with right " peritrochanteric femur fracture.     Plan:   Non weight bearing right lower extremity  For operative fixation 1/11/2024  NPO after midnight  Consent obtained at bedside.   Pre operative abx ordered  Intra operative TXA ordered.   Analgesics for pain per primary team.   DVT ppx per primary team  Medical management per primary team.   Body mass index is 38.31 kg/m². moderately obese. Recommend behavior modifications and nutrition.  Dispo: Ortho will follow  Case reviewed and discussed with Dr. Vaca.    Cyndi Duong PA-C

## 2024-01-10 NOTE — ED ATTENDING ATTESTATION
1/10/2024  IShiva DO, saw and evaluated the patient. I have discussed the patient with the resident/non-physician practitioner and agree with the resident's/non-physician practitioner's findings, Plan of Care, and MDM as documented in the resident's/non-physician practitioner's note, except where noted. All available labs and Radiology studies were reviewed.  I was present for key portions of any procedure(s) performed by the resident/non-physician practitioner and I was immediately available to provide assistance.       At this point I agree with the current assessment done in the Emergency Department.  I have conducted an independent evaluation of this patient a history and physical is as follows: 68-year-old male, past medical history per resident chart, presenting status post mechanical trip and fall with subsequent right hip pain.  See resident note for full details.    Vital signs stable.  Patient resting comfortably. Head atraumatic, normocephalic. AAOX4, CN intact, moving all extremities purposefully. B/L tympanic membranes clear. Nares without septal hematoma and clear. No intraoral trauma. Jaw full ROM. 2+ Radial BUE and 2+ PT BLE. C, T, L spine without step off, tenderness, deformity. Lungs CTA. Clavicles intact. Chest, abd, pelvis without tenderness to palpation. Joints full ROM -with exception of right hip which is unable to be moved secondary to pain.  Generalized tenderness appreciated to the right hip.  Limited examination of the right knee secondary to pain at the right hip.  Distally at the right foot, 5 out of 5 strength in flexion and extension.  Posterior tibial pulse 2+.  Gross sensation intact distally.    68-year-old male presenting with acute fracture of the right hip.  Basic labs ordered however noncontributory in this case and only for inpatient testing.  Patient resting comfortably despite fracture.  Call placed to trauma who accepted their service.      ED Course          Critical Care Time  Procedures

## 2024-01-10 NOTE — ASSESSMENT & PLAN NOTE
1/10/24: Mechanical trip and fall while walking with below noted injuries  Ortho consult  PT/OT  Case management for dispo planning

## 2024-01-10 NOTE — ASSESSMENT & PLAN NOTE
Home medications include amlodipine 5 mg daily, olmesartan/HCTZ 20-12.5 mg daily.  Holding above medications until after surgery today, 1/11/24  Pharmacy does not carry Olmesartan-HCTZ; will need alternate med

## 2024-01-10 NOTE — ASSESSMENT & PLAN NOTE
1/10/24: Mechanical trip and fall while walking, nondisplaced right intertrochanteric femur fracture present on admission.  TXA given in ED  Ortho consult - s/p OR today, 1/11/2024 for intramedullary nailing  Nonweightbearing right lower extremity  Neurovascular checks every 4 hours  Multimodal analgesia  Bowel regimen  DVT prophylaxis: Lovenox, SCDs  Diet: Regular house after OR

## 2024-01-10 NOTE — ED PROVIDER NOTES
History  Chief Complaint   Patient presents with    Fall     Pt. Outside, tripped over feet, fell and approx on ground for 10 mins. No head injury, no LOC. Pt. Reports right hip and knee pain     68-year-old male with PMH of HTN who is BIBEMS to the ED following a mechanical injury that occurred this morning.  Patient states he was stepping down onto the patio when he lost his balance and fell onto his right hip.  Patient states he was on the ground for about 10 minutes however, denies any loss of consciousness or head strike.  EMS was called due to patient not being able to get himself up off the ground.  Patient rates his pain as a 4 out of 10.  Patient has pain with flexion of the hip as well as extension of the hip. Denies chest pain, SOB, cough, abdominal pain, n/v/d, fever, chills, dizziness, lightheadedness, HA, dysuria, hematuria, hematochezia, or melena.           Prior to Admission Medications   Prescriptions Last Dose Informant Patient Reported? Taking?   amLODIPine (NORVASC) 5 mg tablet   No Yes   Sig: TAKE 1 TABLET BY MOUTH  DAILY   olmesartan-hydrochlorothiazide (BENICAR HCT) 20-12.5 MG per tablet   No Yes   Sig: TAKE 1 TABLET BY MOUTH  DAILY      Facility-Administered Medications: None       Past Medical History:   Diagnosis Date    Hypertension        Past Surgical History:   Procedure Laterality Date    COLONOSCOPY  04/27/2015    TONSILLECTOMY      VASECTOMY         Family History   Problem Relation Age of Onset    Cancer Mother     Hypertension Father      I have reviewed and agree with the history as documented.    E-Cigarette/Vaping    E-Cigarette Use Never User      E-Cigarette/Vaping Substances    Nicotine No     THC No     CBD No     Flavoring No     Other No     Unknown No      Social History     Tobacco Use    Smoking status: Never    Smokeless tobacco: Never   Vaping Use    Vaping status: Never Used   Substance Use Topics    Alcohol use: Yes     Alcohol/week: 4.0 standard drinks of alcohol      Types: 1 Glasses of wine, 1 Cans of beer, 1 Shots of liquor, 1 Standard drinks or equivalent per week     Comment: occasional    Drug use: Never        Review of Systems   Constitutional:  Negative for appetite change, chills, diaphoresis, fatigue and fever.   HENT: Negative.  Negative for congestion, ear discharge, ear pain, postnasal drip, rhinorrhea, sore throat and trouble swallowing.    Eyes: Negative.  Negative for pain and visual disturbance.   Respiratory: Negative.  Negative for cough and shortness of breath.    Cardiovascular: Negative.  Negative for chest pain.   Gastrointestinal: Negative.  Negative for abdominal pain, blood in stool, constipation, diarrhea, nausea and vomiting.   Genitourinary: Negative.  Negative for decreased urine volume, difficulty urinating, dysuria, flank pain and hematuria.   Musculoskeletal:  Positive for arthralgias. Negative for back pain.   Skin: Negative.  Negative for color change and rash.   Neurological: Negative.  Negative for dizziness, syncope, weakness, light-headedness and headaches.       Physical Exam  ED Triage Vitals   Temperature Pulse Respirations Blood Pressure SpO2   01/10/24 1151 01/10/24 1151 01/10/24 1151 01/10/24 1151 01/10/24 1151   98 °F (36.7 °C) 68 18 131/70 94 %      Temp Source Heart Rate Source Patient Position - Orthostatic VS BP Location FiO2 (%)   01/10/24 1151 01/10/24 1151 01/10/24 1630 01/10/24 1630 --   Oral Monitor Sitting Right arm       Pain Score       01/10/24 1152       4             Orthostatic Vital Signs  Vitals:    01/10/24 1151 01/10/24 1430 01/10/24 1630   BP: 131/70 130/60 141/76   Pulse: 68 75 72   Patient Position - Orthostatic VS:   Sitting       Physical Exam  Constitutional:       Appearance: Normal appearance. He is normal weight.   HENT:      Head: Normocephalic and atraumatic.      Right Ear: External ear normal.      Left Ear: External ear normal.      Nose: Nose normal.      Mouth/Throat:      Pharynx: Oropharynx  is clear.   Eyes:      Conjunctiva/sclera: Conjunctivae normal.   Cardiovascular:      Rate and Rhythm: Normal rate and regular rhythm.      Pulses: Normal pulses.      Heart sounds: Normal heart sounds.      Comments: RRR with +S1 and S2, no murmurs appreciated on exam. Peripheral pulses intact.    Pulmonary:      Effort: Pulmonary effort is normal. No respiratory distress.      Breath sounds: Normal breath sounds. No wheezing, rhonchi or rales.      Comments: CTABL with no abnormal lung sounds such as wheezes or rales appreciated on exam.     Abdominal:      General: Abdomen is flat. Bowel sounds are normal. There is no distension.      Palpations: Abdomen is soft.      Tenderness: There is no abdominal tenderness.      Comments: Soft, non tender, normo-active bowel sounds. Without rigidity, guarding, or distension.     Musculoskeletal:         General: Tenderness and signs of injury present. No swelling or deformity. Normal range of motion.      Cervical back: Normal range of motion.   Skin:     General: Skin is warm and dry.      Comments: Tenderness to palpation of the right lateral thigh.  Decreased ROM at the hip secondary to pain. No signs of abrasions, ecchymosis, erythema, or gross deformity was noted.     Neurological:      General: No focal deficit present.      Mental Status: He is alert and oriented to person, place, and time. Mental status is at baseline.      Comments: CN II-XII intact. No focal neurologic deficits noted on exam.  5/5 strength in all extremities. Neurovascularly intact with normal sensation and motor function.           ED Medications  Medications   lidocaine (LIDODERM) 5 % patch 1 patch (1 patch Topical Medication Applied 1/10/24 1335)   enoxaparin (LOVENOX) subcutaneous injection 30 mg (30 mg Subcutaneous Given 1/10/24 1647)   acetaminophen (TYLENOL) tablet 650 mg (650 mg Oral Not Given 1/10/24 1439)   oxyCODONE (ROXICODONE) split tablet 2.5 mg (has no administration in time range)      Or   oxyCODONE (ROXICODONE) IR tablet 5 mg (has no administration in time range)   HYDROmorphone HCl (DILAUDID) injection 0.2 mg (has no administration in time range)   naloxone (NARCAN) 0.04 mg/mL syringe 0.04 mg (has no administration in time range)   senna-docusate sodium (SENOKOT S) 8.6-50 mg per tablet 1 tablet (has no administration in time range)   ondansetron (ZOFRAN) injection 4 mg (has no administration in time range)   lactated ringers infusion (100 mL/hr Intravenous New Bag 1/10/24 1649)   tranexamic Acid 1,250 mg in sodium chloride 0.9 % 100 mL IVPB (0 mg Intravenous Stopped 1/10/24 1629)   lactated ringers bolus 1,000 mL (0 mL Intravenous Stopped 1/10/24 1639)       Diagnostic Studies  Results Reviewed       Procedure Component Value Units Date/Time    Protime-INR [[id number]]  (Normal) Collected: 01/10/24 1347    Lab Status: Final result Specimen: Blood from Arm, Right Updated: 01/10/24 1425     Protime 13.6 seconds      INR 0.98    APTT [[id number]]  (Normal) Collected: 01/10/24 1347    Lab Status: Final result Specimen: Blood from Arm, Right Updated: 01/10/24 1425     PTT 27 seconds     Comprehensive metabolic panel [[id number]]  (Abnormal) Collected: 01/10/24 1347    Lab Status: Final result Specimen: Blood from Arm, Right Updated: 01/10/24 1423     Sodium 138 mmol/L      Potassium 3.8 mmol/L      Chloride 101 mmol/L      CO2 31 mmol/L      ANION GAP 6 mmol/L      BUN 12 mg/dL      Creatinine 0.80 mg/dL      Glucose 116 mg/dL      Calcium 9.3 mg/dL      AST 32 U/L      ALT 35 U/L      Alkaline Phosphatase 73 U/L      Total Protein 6.3 g/dL      Albumin 3.9 g/dL      Total Bilirubin 0.52 mg/dL      eGFR 91 ml/min/1.73sq m     Narrative:      [employer] guidelines for Chronic Kidney Disease (CKD):     Stage 1 with normal or high GFR (GFR > 90 mL/min/1.73 square meters)    Stage 2 Mild CKD (GFR = 60-89 mL/min/1.73 square meters)    Stage 3A Moderate CKD (GFR = 45-59  mL/min/1.73 square meters)    Stage 3B Moderate CKD (GFR = 30-44 mL/min/1.73 square meters)    Stage 4 Severe CKD (GFR = 15-29 mL/min/1.73 square meters)    Stage 5 End Stage CKD (GFR <15 mL/min/1.73 square meters)  Note: GFR calculation is accurate only with a steady state creatinine    CBC and differential [[id number]]  (Abnormal) Collected: 01/10/24 1347    Lab Status: Final result Specimen: Blood from Arm, Right Updated: 01/10/24 1408     WBC 9.62 Thousand/uL      RBC 4.95 Million/uL      Hemoglobin 15.2 g/dL      Hematocrit 46.0 %      MCV 93 fL      MCH 30.7 pg      MCHC 33.0 g/dL      RDW 13.1 %      MPV 9.2 fL      Platelets 208 Thousands/uL      nRBC 0 /100 WBCs      Neutrophils Relative 88 %      Immat GRANS % 0 %      Lymphocytes Relative 6 %      Monocytes Relative 5 %      Eosinophils Relative 1 %      Basophils Relative 0 %      Neutrophils Absolute 8.40 Thousands/µL      Immature Grans Absolute 0.04 Thousand/uL      Lymphocytes Absolute 0.60 Thousands/µL      Monocytes Absolute 0.49 Thousand/µL      Eosinophils Absolute 0.06 Thousand/µL      Basophils Absolute 0.03 Thousands/µL                    XR femur 2 views RIGHT   ED Interpretation by Bird Bobo MD (01/10 1315)   Image was independently interpreted by myself and showed intertrochanteric fracture of the Right femoral neck.        Final Result by Florin Cohen MD (01/10 4853)      Acute essentially nondisplaced right intertrochanteric femoral fracture            Workstation performed: [id number]         XR pelvis ap only 1 or 2 vw   ED Interpretation by Bird Bobo MD (01/10 2285)   Image was independently interpreted by myself and showed intertrochanteric fracture of the Right femoral neck.        Final Result by Florin Cohen MD (01/10 9138)      Acute essentially nondisplaced intertrochanteric right femoral fracture. Pelvis appears intact      Workstation performed: [id number]         XR chest 1 view   ED Interpretation by  Bird Bobo MD (01/10 1315)   Image was independently interpreted by myself and showed no acute concerns of cardiopulmonary disease at this time.        Final Result by Florin Cohen MD (01/10 1340)      No acute cardiopulmonary disease.                  Workstation performed: [id number]         XR knee 1 or 2 vw right   ED Interpretation by Bird Bobo MD (01/10 1315)   Image was independently interpreted by myself and showed no acute concerns or fractures at this time.        Final Result by Florin Cohen MD (01/10 1335)      Arthritis. No fracture.            Workstation performed: [id number]               Procedures  Procedures      ED Course  ED Course as of 01/10/24 1943   Wed Ender 10, 2024   1315 XR pelvis ap only 1 or 2 vw  Image was independently interpreted by myself and showed intertrochanteric fracture of the Right femoral neck.     1316 XR femur 2 views RIGHT  Image was independently interpreted by myself and showed intertrochanteric fracture of the Right femoral neck.     1316 XR knee 1 or 2 vw right  Image was independently interpreted by myself and showed no acute concerns or fractures at this time.     1316 XR chest 1 view  Image was independently interpreted by myself and showed no acute concerns of cardiopulmonary disease at this time.     1322 Spoke with trauma team about the Right femoral fracture and they said they would see the patient.                                       Medical Decision Making  68-year-old male with PMH of HTN who was BIBEMS to the ED following a mechanical injury that occurred this morning.  Patient's labs and imaging was obtained and reviewed by ED provider.  Patient's labs showed no acute concerns at this time.  See ED course for more details about patient's ED stay.  Patient's 2 view right femur x-rays show signs of right femoral neck fracture.  Patient was given lidocaine patch for pain control.  Patient's case was discussed with the trauma team  came and evaluated the patient.  Patient was accepted for inpatient admission under the service of Dr. Conteh with the trauma service.    Amount and/or Complexity of Data Reviewed  Labs: ordered.  Radiology: ordered and independent interpretation performed. Decision-making details documented in ED Course.    Risk  Prescription drug management.  Decision regarding hospitalization.          Disposition  Final diagnoses:   Right femoral fracture (HCC)     Time reflects when diagnosis was documented in both MDM as applicable and the Disposition within this note       Time User Action Codes Description Comment    1/10/2024  1:29 PM Bird Bobo Add [S72.91XA] Right femoral fracture (HCC)           ED Disposition       ED Disposition   Admit    Condition   Stable    Date/Time   Wed Ender 10, 2024  1:29 PM    Comment   Case was discussed with Trauma team and the patient's admission status was agreed to be Admission Status: inpatient status to the service of Dr. Conteh .               Follow-up Information    None         Current Discharge Medication List        CONTINUE these medications which have NOT CHANGED    Details   amLODIPine (NORVASC) 5 mg tablet TAKE 1 TABLET BY MOUTH  DAILY  Qty: 100 tablet, Refills: 2    Comments: Requesting 1 year supply  Associated Diagnoses: Essential hypertension      olmesartan-hydrochlorothiazide (BENICAR HCT) 20-12.5 MG per tablet TAKE 1 TABLET BY MOUTH  DAILY  Qty: 100 tablet, Refills: 2    Comments: Requesting 1 year supply  Associated Diagnoses: Essential hypertension           No discharge procedures on file.    PDMP Review       None             ED Provider  Attending physically available and evaluated Kemal Huddleston. I managed the patient along with the ED Attending.    Electronically Signed by           Bird Bobo MD  01/10/24 1940

## 2024-01-11 ENCOUNTER — APPOINTMENT (INPATIENT)
Dept: RADIOLOGY | Facility: HOSPITAL | Age: 69
DRG: 482 | End: 2024-01-11
Payer: COMMERCIAL

## 2024-01-11 ENCOUNTER — ANESTHESIA (INPATIENT)
Dept: PERIOP | Facility: HOSPITAL | Age: 69
DRG: 482 | End: 2024-01-11
Payer: COMMERCIAL

## 2024-01-11 LAB
ANION GAP SERPL CALCULATED.3IONS-SCNC: 8 MMOL/L
BASOPHILS # BLD AUTO: 0.01 THOUSANDS/ÂΜL (ref 0–0.1)
BASOPHILS NFR BLD AUTO: 0 % (ref 0–1)
BUN SERPL-MCNC: 12 MG/DL (ref 5–25)
CALCIUM SERPL-MCNC: 8.9 MG/DL (ref 8.4–10.2)
CHLORIDE SERPL-SCNC: 104 MMOL/L (ref 96–108)
CO2 SERPL-SCNC: 26 MMOL/L (ref 21–32)
CREAT SERPL-MCNC: 0.8 MG/DL (ref 0.6–1.3)
EOSINOPHIL # BLD AUTO: 0.1 THOUSAND/ÂΜL (ref 0–0.61)
EOSINOPHIL NFR BLD AUTO: 1 % (ref 0–6)
ERYTHROCYTE [DISTWIDTH] IN BLOOD BY AUTOMATED COUNT: 13.1 % (ref 11.6–15.1)
GFR SERPL CREATININE-BSD FRML MDRD: 91 ML/MIN/1.73SQ M
GLUCOSE SERPL-MCNC: 115 MG/DL (ref 65–140)
HCT VFR BLD AUTO: 41.4 % (ref 36.5–49.3)
HCT VFR BLD AUTO: 43.2 % (ref 36.5–49.3)
HGB BLD-MCNC: 13.6 G/DL (ref 12–17)
HGB BLD-MCNC: 14.5 G/DL (ref 12–17)
IMM GRANULOCYTES # BLD AUTO: 0.03 THOUSAND/UL (ref 0–0.2)
IMM GRANULOCYTES NFR BLD AUTO: 0 % (ref 0–2)
LYMPHOCYTES # BLD AUTO: 1.36 THOUSANDS/ÂΜL (ref 0.6–4.47)
LYMPHOCYTES NFR BLD AUTO: 15 % (ref 14–44)
MAGNESIUM SERPL-MCNC: 2 MG/DL (ref 1.9–2.7)
MCH RBC QN AUTO: 30.8 PG (ref 26.8–34.3)
MCHC RBC AUTO-ENTMCNC: 33.6 G/DL (ref 31.4–37.4)
MCV RBC AUTO: 92 FL (ref 82–98)
MONOCYTES # BLD AUTO: 1.05 THOUSAND/ÂΜL (ref 0.17–1.22)
MONOCYTES NFR BLD AUTO: 12 % (ref 4–12)
NEUTROPHILS # BLD AUTO: 6.42 THOUSANDS/ÂΜL (ref 1.85–7.62)
NEUTS SEG NFR BLD AUTO: 72 % (ref 43–75)
NRBC BLD AUTO-RTO: 0 /100 WBCS
PLATELET # BLD AUTO: 210 THOUSANDS/UL (ref 149–390)
PMV BLD AUTO: 9.2 FL (ref 8.9–12.7)
POTASSIUM SERPL-SCNC: 3.8 MMOL/L (ref 3.5–5.3)
RBC # BLD AUTO: 4.71 MILLION/UL (ref 3.88–5.62)
SODIUM SERPL-SCNC: 138 MMOL/L (ref 135–147)
WBC # BLD AUTO: 8.97 THOUSAND/UL (ref 4.31–10.16)

## 2024-01-11 PROCEDURE — 85025 COMPLETE CBC W/AUTO DIFF WBC: CPT | Performed by: STUDENT IN AN ORGANIZED HEALTH CARE EDUCATION/TRAINING PROGRAM

## 2024-01-11 PROCEDURE — 73552 X-RAY EXAM OF FEMUR 2/>: CPT

## 2024-01-11 PROCEDURE — 85014 HEMATOCRIT: CPT

## 2024-01-11 PROCEDURE — 99232 SBSQ HOSP IP/OBS MODERATE 35: CPT | Performed by: SURGERY

## 2024-01-11 PROCEDURE — 0QS606Z REPOSITION RIGHT UPPER FEMUR WITH INTRAMEDULLARY INTERNAL FIXATION DEVICE, OPEN APPROACH: ICD-10-PCS | Performed by: STUDENT IN AN ORGANIZED HEALTH CARE EDUCATION/TRAINING PROGRAM

## 2024-01-11 PROCEDURE — 83735 ASSAY OF MAGNESIUM: CPT | Performed by: STUDENT IN AN ORGANIZED HEALTH CARE EDUCATION/TRAINING PROGRAM

## 2024-01-11 PROCEDURE — C1713 ANCHOR/SCREW BN/BN,TIS/BN: HCPCS | Performed by: STUDENT IN AN ORGANIZED HEALTH CARE EDUCATION/TRAINING PROGRAM

## 2024-01-11 PROCEDURE — NC001 PR NO CHARGE: Performed by: STUDENT IN AN ORGANIZED HEALTH CARE EDUCATION/TRAINING PROGRAM

## 2024-01-11 PROCEDURE — 27245 TREAT THIGH FRACTURE: CPT | Performed by: STUDENT IN AN ORGANIZED HEALTH CARE EDUCATION/TRAINING PROGRAM

## 2024-01-11 PROCEDURE — 80048 BASIC METABOLIC PNL TOTAL CA: CPT | Performed by: STUDENT IN AN ORGANIZED HEALTH CARE EDUCATION/TRAINING PROGRAM

## 2024-01-11 PROCEDURE — 27245 TREAT THIGH FRACTURE: CPT

## 2024-01-11 PROCEDURE — 85018 HEMOGLOBIN: CPT

## 2024-01-11 DEVICE — LOCKING SCREW FOR IM NAIL Ø 5MM/ 50MM/ XL25/ STERILE: Type: IMPLANTABLE DEVICE | Site: FEMUR | Status: FUNCTIONAL

## 2024-01-11 DEVICE — TFNA FENESTRATED HELICAL BLADE 105MM - STERILE
Type: IMPLANTABLE DEVICE | Site: FEMUR | Status: FUNCTIONAL
Brand: TFN-ADVANCE

## 2024-01-11 DEVICE — LOCKING SCREW FOR IM NAIL Ø 5MM/ 46MM/ XL25/ STERILE: Type: IMPLANTABLE DEVICE | Site: FEMUR | Status: FUNCTIONAL

## 2024-01-11 DEVICE — 11MM/130 DEG TI CANN TFNA 400MM/RIGHT - STERILE
Type: IMPLANTABLE DEVICE | Site: FEMUR | Status: FUNCTIONAL
Brand: TFN-ADVANCE

## 2024-01-11 RX ORDER — ALBUTEROL SULFATE 2.5 MG/3ML
2.5 SOLUTION RESPIRATORY (INHALATION) ONCE AS NEEDED
Status: DISCONTINUED | OUTPATIENT
Start: 2024-01-11 | End: 2024-01-11 | Stop reason: HOSPADM

## 2024-01-11 RX ORDER — MIDAZOLAM HYDROCHLORIDE 2 MG/2ML
INJECTION, SOLUTION INTRAMUSCULAR; INTRAVENOUS AS NEEDED
Status: DISCONTINUED | OUTPATIENT
Start: 2024-01-11 | End: 2024-01-11

## 2024-01-11 RX ORDER — PROPOFOL 10 MG/ML
INJECTION, EMULSION INTRAVENOUS AS NEEDED
Status: DISCONTINUED | OUTPATIENT
Start: 2024-01-11 | End: 2024-01-11

## 2024-01-11 RX ORDER — TRANEXAMIC ACID 10 MG/ML
INJECTION, SOLUTION INTRAVENOUS AS NEEDED
Status: DISCONTINUED | OUTPATIENT
Start: 2024-01-11 | End: 2024-01-11

## 2024-01-11 RX ORDER — CEFAZOLIN SODIUM 2 G/50ML
2000 SOLUTION INTRAVENOUS EVERY 8 HOURS
Qty: 100 ML | Refills: 0 | Status: COMPLETED | OUTPATIENT
Start: 2024-01-11 | End: 2024-01-11

## 2024-01-11 RX ORDER — SODIUM CHLORIDE 9 MG/ML
INJECTION, SOLUTION INTRAVENOUS CONTINUOUS PRN
Status: DISCONTINUED | OUTPATIENT
Start: 2024-01-11 | End: 2024-01-11

## 2024-01-11 RX ORDER — LABETALOL HYDROCHLORIDE 5 MG/ML
10 INJECTION, SOLUTION INTRAVENOUS
Status: DISCONTINUED | OUTPATIENT
Start: 2024-01-11 | End: 2024-01-16 | Stop reason: HOSPADM

## 2024-01-11 RX ORDER — HYDROMORPHONE HCL/PF 1 MG/ML
SYRINGE (ML) INJECTION AS NEEDED
Status: DISCONTINUED | OUTPATIENT
Start: 2024-01-11 | End: 2024-01-11

## 2024-01-11 RX ORDER — ONDANSETRON 2 MG/ML
4 INJECTION INTRAMUSCULAR; INTRAVENOUS ONCE AS NEEDED
Status: DISCONTINUED | OUTPATIENT
Start: 2024-01-11 | End: 2024-01-11 | Stop reason: HOSPADM

## 2024-01-11 RX ORDER — MAGNESIUM HYDROXIDE 1200 MG/15ML
LIQUID ORAL AS NEEDED
Status: DISCONTINUED | OUTPATIENT
Start: 2024-01-11 | End: 2024-01-11 | Stop reason: HOSPADM

## 2024-01-11 RX ORDER — ONDANSETRON 2 MG/ML
INJECTION INTRAMUSCULAR; INTRAVENOUS AS NEEDED
Status: DISCONTINUED | OUTPATIENT
Start: 2024-01-11 | End: 2024-01-11

## 2024-01-11 RX ORDER — LIDOCAINE HCL/PF 100 MG/5ML
SYRINGE (ML) INJECTION AS NEEDED
Status: DISCONTINUED | OUTPATIENT
Start: 2024-01-11 | End: 2024-01-11

## 2024-01-11 RX ORDER — PHENYLEPHRINE HCL IN 0.9% NACL 1 MG/10 ML
SYRINGE (ML) INTRAVENOUS AS NEEDED
Status: DISCONTINUED | OUTPATIENT
Start: 2024-01-11 | End: 2024-01-11

## 2024-01-11 RX ORDER — KETAMINE HCL IN NACL, ISO-OSM 100MG/10ML
SYRINGE (ML) INJECTION AS NEEDED
Status: DISCONTINUED | OUTPATIENT
Start: 2024-01-11 | End: 2024-01-11

## 2024-01-11 RX ORDER — TRANEXAMIC ACID 10 MG/ML
1000 INJECTION, SOLUTION INTRAVENOUS
Status: ACTIVE | OUTPATIENT
Start: 2024-01-11 | End: 2024-01-12

## 2024-01-11 RX ORDER — CEFAZOLIN SODIUM 2 G/50ML
2000 SOLUTION INTRAVENOUS
Status: COMPLETED | OUTPATIENT
Start: 2024-01-11 | End: 2024-01-11

## 2024-01-11 RX ORDER — FENTANYL CITRATE 50 UG/ML
INJECTION, SOLUTION INTRAMUSCULAR; INTRAVENOUS AS NEEDED
Status: DISCONTINUED | OUTPATIENT
Start: 2024-01-11 | End: 2024-01-11

## 2024-01-11 RX ORDER — AMLODIPINE BESYLATE 5 MG/1
5 TABLET ORAL DAILY
Status: DISCONTINUED | OUTPATIENT
Start: 2024-01-11 | End: 2024-01-16 | Stop reason: HOSPADM

## 2024-01-11 RX ORDER — METOCLOPRAMIDE HYDROCHLORIDE 5 MG/ML
10 INJECTION INTRAMUSCULAR; INTRAVENOUS ONCE AS NEEDED
Status: DISCONTINUED | OUTPATIENT
Start: 2024-01-11 | End: 2024-01-11 | Stop reason: HOSPADM

## 2024-01-11 RX ORDER — LOSARTAN POTASSIUM 50 MG/1
50 TABLET ORAL DAILY
Status: DISCONTINUED | OUTPATIENT
Start: 2024-01-11 | End: 2024-01-16 | Stop reason: HOSPADM

## 2024-01-11 RX ORDER — CEFAZOLIN SODIUM 1 G/50ML
1000 SOLUTION INTRAVENOUS EVERY 8 HOURS
Qty: 100 ML | Refills: 0 | Status: COMPLETED | OUTPATIENT
Start: 2024-01-11 | End: 2024-01-12

## 2024-01-11 RX ORDER — HYDRALAZINE HYDROCHLORIDE 20 MG/ML
5 INJECTION INTRAMUSCULAR; INTRAVENOUS ONCE AS NEEDED
Status: DISCONTINUED | OUTPATIENT
Start: 2024-01-11 | End: 2024-01-11 | Stop reason: HOSPADM

## 2024-01-11 RX ORDER — FENTANYL CITRATE/PF 50 MCG/ML
50 SYRINGE (ML) INJECTION
Status: COMPLETED | OUTPATIENT
Start: 2024-01-11 | End: 2024-01-11

## 2024-01-11 RX ORDER — HYDROCHLOROTHIAZIDE 12.5 MG/1
12.5 TABLET ORAL DAILY
Status: DISCONTINUED | OUTPATIENT
Start: 2024-01-11 | End: 2024-01-16 | Stop reason: HOSPADM

## 2024-01-11 RX ORDER — SODIUM CHLORIDE, SODIUM LACTATE, POTASSIUM CHLORIDE, CALCIUM CHLORIDE 600; 310; 30; 20 MG/100ML; MG/100ML; MG/100ML; MG/100ML
50 INJECTION, SOLUTION INTRAVENOUS CONTINUOUS
Status: DISCONTINUED | OUTPATIENT
Start: 2024-01-11 | End: 2024-01-12

## 2024-01-11 RX ORDER — ROCURONIUM BROMIDE 10 MG/ML
INJECTION, SOLUTION INTRAVENOUS AS NEEDED
Status: DISCONTINUED | OUTPATIENT
Start: 2024-01-11 | End: 2024-01-11

## 2024-01-11 RX ORDER — GLYCOPYRROLATE 0.2 MG/ML
INJECTION INTRAMUSCULAR; INTRAVENOUS AS NEEDED
Status: DISCONTINUED | OUTPATIENT
Start: 2024-01-11 | End: 2024-01-11

## 2024-01-11 RX ORDER — DEXAMETHASONE SODIUM PHOSPHATE 10 MG/ML
INJECTION, SOLUTION INTRAMUSCULAR; INTRAVENOUS AS NEEDED
Status: DISCONTINUED | OUTPATIENT
Start: 2024-01-11 | End: 2024-01-11

## 2024-01-11 RX ORDER — CEFAZOLIN SODIUM 1 G/50ML
1000 SOLUTION INTRAVENOUS ONCE
Status: COMPLETED | OUTPATIENT
Start: 2024-01-11 | End: 2024-01-11

## 2024-01-11 RX ORDER — HYDROMORPHONE HCL/PF 1 MG/ML
0.5 SYRINGE (ML) INJECTION
Status: DISCONTINUED | OUTPATIENT
Start: 2024-01-11 | End: 2024-01-11 | Stop reason: HOSPADM

## 2024-01-11 RX ADMIN — CEFAZOLIN SODIUM 2000 MG: 2 SOLUTION INTRAVENOUS at 07:28

## 2024-01-11 RX ADMIN — ACETAMINOPHEN 650 MG: 325 TABLET, FILM COATED ORAL at 05:14

## 2024-01-11 RX ADMIN — HYDROCHLOROTHIAZIDE 12.5 MG: 12.5 TABLET ORAL at 14:25

## 2024-01-11 RX ADMIN — SODIUM CHLORIDE: 0.9 INJECTION, SOLUTION INTRAVENOUS at 07:46

## 2024-01-11 RX ADMIN — CEFAZOLIN SODIUM 1000 MG: 1 SOLUTION INTRAVENOUS at 07:28

## 2024-01-11 RX ADMIN — FENTANYL CITRATE 50 MCG: 50 INJECTION INTRAMUSCULAR; INTRAVENOUS at 09:27

## 2024-01-11 RX ADMIN — Medication 30 MG: at 07:38

## 2024-01-11 RX ADMIN — LOSARTAN POTASSIUM 50 MG: 50 TABLET, FILM COATED ORAL at 14:25

## 2024-01-11 RX ADMIN — TRANEXAMIC ACID 1000 MG: 10 INJECTION, SOLUTION INTRAVENOUS at 07:55

## 2024-01-11 RX ADMIN — HYDROMORPHONE HYDROCHLORIDE 0.5 MG: 1 INJECTION, SOLUTION INTRAMUSCULAR; INTRAVENOUS; SUBCUTANEOUS at 08:12

## 2024-01-11 RX ADMIN — FENTANYL CITRATE 50 MCG: 50 INJECTION INTRAMUSCULAR; INTRAVENOUS at 09:18

## 2024-01-11 RX ADMIN — LIDOCAINE HYDROCHLORIDE 100 MG: 20 INJECTION INTRAVENOUS at 07:38

## 2024-01-11 RX ADMIN — PROPOFOL 250 MG: 10 INJECTION, EMULSION INTRAVENOUS at 07:39

## 2024-01-11 RX ADMIN — CEFAZOLIN SODIUM 1000 MG: 1 SOLUTION INTRAVENOUS at 15:48

## 2024-01-11 RX ADMIN — GLYCOPYRROLATE 0.2 MG: 0.2 INJECTION INTRAMUSCULAR; INTRAVENOUS at 08:09

## 2024-01-11 RX ADMIN — AMLODIPINE BESYLATE 5 MG: 5 TABLET ORAL at 14:25

## 2024-01-11 RX ADMIN — FENTANYL CITRATE 50 MCG: 50 INJECTION INTRAMUSCULAR; INTRAVENOUS at 08:42

## 2024-01-11 RX ADMIN — ENOXAPARIN SODIUM 30 MG: 30 INJECTION SUBCUTANEOUS at 11:52

## 2024-01-11 RX ADMIN — ACETAMINOPHEN 650 MG: 325 TABLET, FILM COATED ORAL at 00:48

## 2024-01-11 RX ADMIN — SUGAMMADEX 200 MG: 100 INJECTION, SOLUTION INTRAVENOUS at 08:48

## 2024-01-11 RX ADMIN — MIDAZOLAM 2 MG: 1 INJECTION INTRAMUSCULAR; INTRAVENOUS at 07:26

## 2024-01-11 RX ADMIN — Medication 20 MG: at 08:09

## 2024-01-11 RX ADMIN — Medication 100 MCG: at 07:47

## 2024-01-11 RX ADMIN — Medication 100 MCG: at 07:40

## 2024-01-11 RX ADMIN — ACETAMINOPHEN 650 MG: 325 TABLET, FILM COATED ORAL at 11:52

## 2024-01-11 RX ADMIN — PHENYLEPHRINE HYDROCHLORIDE 30 MCG/MIN: 10 INJECTION INTRAVENOUS at 07:41

## 2024-01-11 RX ADMIN — ENOXAPARIN SODIUM 30 MG: 30 INJECTION SUBCUTANEOUS at 00:47

## 2024-01-11 RX ADMIN — CEFAZOLIN SODIUM 2000 MG: 2 SOLUTION INTRAVENOUS at 22:46

## 2024-01-11 RX ADMIN — ACETAMINOPHEN 650 MG: 325 TABLET, FILM COATED ORAL at 17:11

## 2024-01-11 RX ADMIN — ROCURONIUM BROMIDE 50 MG: 10 INJECTION, SOLUTION INTRAVENOUS at 07:40

## 2024-01-11 RX ADMIN — HYDROMORPHONE HYDROCHLORIDE 0.5 MG: 1 INJECTION, SOLUTION INTRAMUSCULAR; INTRAVENOUS; SUBCUTANEOUS at 09:34

## 2024-01-11 RX ADMIN — FENTANYL CITRATE 50 MCG: 50 INJECTION INTRAMUSCULAR; INTRAVENOUS at 07:38

## 2024-01-11 RX ADMIN — DEXAMETHASONE SODIUM PHOSPHATE 10 MG: 10 INJECTION INTRAMUSCULAR; INTRAVENOUS at 07:39

## 2024-01-11 RX ADMIN — SODIUM CHLORIDE, SODIUM LACTATE, POTASSIUM CHLORIDE, AND CALCIUM CHLORIDE 50 ML/HR: .6; .31; .03; .02 INJECTION, SOLUTION INTRAVENOUS at 10:13

## 2024-01-11 RX ADMIN — ONDANSETRON 4 MG: 2 INJECTION INTRAMUSCULAR; INTRAVENOUS at 08:46

## 2024-01-11 RX ADMIN — PROPOFOL 50 MG: 10 INJECTION, EMULSION INTRAVENOUS at 07:40

## 2024-01-11 RX ADMIN — CEFAZOLIN SODIUM 2000 MG: 2 SOLUTION INTRAVENOUS at 14:25

## 2024-01-11 NOTE — OP NOTE
OPERATIVE REPORT  PATIENT NAME: Kemal Huddleston    :  1955  MRN: [id number]  Pt Location: AN OR ROOM 01    SURGERY DATE: 2024    Surgeon(s) and Role:     * Frandy Vaca DO - Primary     * Andrae Mary PA-C - Assisting   I was present for the entire procedure., I was present for all critical portions of the procedure., A qualified resident physician was not available., and A physician assistant was required during the procedure for retraction, tissue handling, dissection and suturing.    Preop Diagnosis:  #1 right peritrochanteric femur fracture      Post-Op Diagnosis:  #1 right peritrochanteric femur fracture    Procedures:  #1 surgical fixation of right peritrochanteric femur fracture with an intramedullary nail      Specimen(s):  None    Estimated Blood Loss:   40 cc    Drains:  None    Anesthesia Type:   General endotracheal    Operative Indications:  Patient is a 68-year-old male that sustained a ground-level fall while at home that normally ambulates without an assistive device with bilateral chronic knee osteoarthritis which causes him to limp when he had a fall and a right peritrochanteric femur fracture.  In order to restore ambulatory status and decrease the complications associated with a bedridden status patient was consented for surgical fixation of the right femur      Implants:   Synthes 400 mm x 11 mm TFN a with 105 mm helical blade    Tourniquet time:   None      Complications:   No acute complications were encountered.  Patient was transferred to PACU in stable condition    Operative findings:  Patient had a right peritrochanteric femur fracture with the basicervical in nature.  It was able to be surgically fixated using a intramedullary device using the fracture top table for reduction aid.  The patient's hip was stable after fixation.  Rotational profile confirmed    Procedure and Technique:  Patient is a 68-year-old male that was seen and examined the preoperative holding area.  The  operative extremities marked.  All patient's questions were answered.  The patient was then taken to the operating room where general endotracheal anesthesia was administered by department anesthesia.  The patient was then transferred over the operating table using Cetylev spine precautions.  All bony prominences were well-padded.  The patient's right lower extremity was then prepped and draped in a standard sterile orthopedic fashion.  A timeout was performed confirm correct side, correct patient, correct procedure.  All were in agreement procedure started.  Using a flat seeing the fracture top table close reduction maneuvers were performed to reduce the patient's right peritrochanteric femur fracture length rotation and alignment were restored.  A incision was made proximal to the greater trochanter sharply with a #10 blade through skin subcutaneous tissues.  Blunt dissection was carried down to the level of the trochanter.  A guidewire was then inserted into the appropriate starting point entry angle into the proximal femur confirmed on AP and lateral views.  An opening reamer was used to gain access to the medullary canal.  A ball-tipped guidewire was then inserted center center down the length of the femur.  Sequential reaming was then performed from 8.5 mm to 12.5 mm.  A Synthes 400 mm x 11 mm TFN a was selected attached to the jig and inserted into the intramedullary canal.  It was then secured proximally with 105 mm helical blade.  Compression was obtained at the fracture site while releasing traction to gain medial cortical contact.  Appropriate tip to apex distance confirmed.  The nail was then secured distally with 2 interlocking screws 1 through the static proximal and 1 through the proximal aspect of the dynamic slot using using perfect [place] technique and confirmed at the AP and lateral x-rays.  The external jig was removed and final reduction implant placement was confirmed under multiplanar  fluoroscopic imaging.  The wounds were then copiously irrigated with sterile normal saline.  The deep tissues were repaired using a 0 PDS suture.  The subcutaneous tissues were repaired with a 2-0 Monocryl suture.  The skin was closed with staples.  The wounds were then dressed in Mepilex dressings.  The patient was then transferred off the operating table in Cetylev spine precautions extubated transferred to PACU in stable condition        Postoperative plan:  Patient will be weightbearing as tolerated to the right lower extremity.  Patient will receive 24 hours of postoperative antibiotics for infection prophylaxis.  The patient will be started on Lovenox while in the hospital and will be discharged on aspirin 325 mg twice daily for DVT prophylaxis for 6 weeks.  Patient will need to follow-up in 2 weeks time for repeat x-ray evaluation and removal of staples    SIGNATURE: Frandy Vaca DO  DATE: January 11, 2024  TIME: 8:56 AM

## 2024-01-11 NOTE — PROGRESS NOTES
"Formerly [facility], later [facility]  Progress Note/Post-op Check  Name: Kemal Huddleston I  MRN: [id number]  Unit/Bed#: W -01 I Date of Admission: 1/10/2024   Date of Service: 1/11/2024 I Hospital Day: 1    Assessment/Plan   Nondisplaced intertrochanteric fracture of right femur, initial encounter for closed fracture (HCC)  Assessment & Plan  1/10/24: Mechanical trip and fall while walking, nondisplaced right intertrochanteric femur fracture present on admission.  TXA given in ED  Ortho consult - s/p OR today, 1/11/2024 for intramedullary nailing  Nonweightbearing right lower extremity  Neurovascular checks every 4 hours  Multimodal analgesia  Bowel regimen  DVT prophylaxis: Lovenox, SCDs  Diet: Regular house after OR    Fall  Assessment & Plan  1/10/24: Mechanical trip and fall while walking with below noted injuries  Ortho consult  PT/OT  Case management for dispo planning    HTN (hypertension)  Assessment & Plan  Home medications include amlodipine 5 mg daily, olmesartan/HCTZ 20-12.5 mg daily.  Holding above medications until after surgery today, 1/11/24  Pharmacy does not carry Olmesartan-HCTZ; will need alternate med             Bowel Regimen: senna  VTE Prophylaxis:Sequential compression device (Venodyne)  and Enoxaparin (Lovenox)     Disposition: remain in med-surg. Needs PT and dispo planning    Subjective   Chief Complaint: \"I feel good, a lot better than yesterday \"    Subjective: Feels pain is controlled.  Doing well postoperatively, will attempt diet.     Objective   Vitals:   Temp:  [98.2 °F (36.8 °C)-98.8 °F (37.1 °C)] 98.2 °F (36.8 °C)  HR:  [70-88] 75  Resp:  [16-20] 16  BP: (113-147)/(60-83) 113/79    I/O         01/09 0701  01/10 0700 01/10 0701 01/11 0700 01/11 0701 01/12 0700    IV Piggyback  1100     Total Intake(mL/kg)  1100 (8.9)     Urine (mL/kg/hr)  800     Total Output  800     Net  +300                     Physical Exam:   GENERAL APPEARANCE: Well-appearing  NEURO: GCS 15  HEENT: " Atraumatic  CV: RRR  LUNGS: On 1 L nasal cannula, normal respiratory rate and effort, clear breath sounds bilaterally  GI: Soft nontender nonperitoneal  : Voiding independently  MSK: Right lower extremity: Wound sites are clean, dry, intact.  Soft compartments.  Neurovascular intact.  Bilateral lower extremity pitting edema  SKIN: Bilateral venous insufficiency    Invasive Devices       Peripheral Intravenous Line  Duration             Peripheral IV 01/10/24 Right Antecubital <1 day    Peripheral IV 01/11/24 Dorsal (posterior);Left Hand <1 day                          Lab Results: BMP/CMP:   Lab Results   Component Value Date    SODIUM 138 01/11/2024    K 3.8 01/11/2024     01/11/2024    CO2 26 01/11/2024    BUN 12 01/11/2024    CREATININE 0.80 01/11/2024    CALCIUM 8.9 01/11/2024    AST 32 01/10/2024    ALT 35 01/10/2024    ALKPHOS 73 01/10/2024    EGFR 91 01/11/2024    and CBC:   Lab Results   Component Value Date    WBC 8.97 01/11/2024    HGB 14.5 01/11/2024    HCT 43.2 01/11/2024    MCV 92 01/11/2024     01/11/2024    RBC 4.71 01/11/2024    MCH 30.8 01/11/2024    MCHC 33.6 01/11/2024    RDW 13.1 01/11/2024    MPV 9.2 01/11/2024    NRBC 0 01/11/2024     Imaging: I have personally reviewed pertinent reports.     Other Studies:

## 2024-01-11 NOTE — PROGRESS NOTES
Progress Note - Orthopedics   Kemal Huddleston 68 y.o. male MRN: [id number]  Unit/Bed#: APU 4      Subjective:    68 y.o.male seen and examined in the preoperative holding area.  He states he has mild pain in his right hip that radiates down his right thigh at the time of examination.  Rates his pain at a 5 out of 10 currently.  He states he is able to move his feet and his toes bilaterally.  He has no other acute complaints at this time.  Denies numbness tingling or paresthesias in the bilateral lower extremities.    Labs:  0   Lab Value Date/Time    HCT 43.2 01/11/2024 0633    HCT 46.0 01/10/2024 1347    HCT 42.7 08/07/2023 0711    HCT 47.1 08/06/2022 0703    HCT 44.2 08/02/2021 0801    HGB 14.5 01/11/2024 0633    HGB 15.2 01/10/2024 1347    HGB 14.6 08/07/2023 0711    HGB 15.5 08/06/2022 0703    HGB 14.6 08/02/2021 0801    INR 0.98 01/10/2024 1347    WBC 8.97 01/11/2024 0633    WBC 9.62 01/10/2024 1347    WBC 5.3 08/07/2023 0711    WBC 5.4 08/06/2022 0703    WBC 6.6 08/02/2021 0801       Meds:    Current Facility-Administered Medications:     [Transfer Hold] acetaminophen (TYLENOL) tablet 650 mg, 650 mg, Oral, Q6H [facility], Ema Burch DO, 650 mg at 01/11/24 0514    ceFAZolin (ANCEF) IVPB (premix in dextrose) 2,000 mg 50 mL, 2,000 mg, Intravenous, On Call To OR, Cyndi Duong PA-C    [Transfer Hold] enoxaparin (LOVENOX) subcutaneous injection 30 mg, 30 mg, Subcutaneous, Q12H, Ema Burch DO, 30 mg at 01/11/24 0047    [Transfer Hold] HYDROmorphone HCl (DILAUDID) injection 0.2 mg, 0.2 mg, Intravenous, Q2H PRN, Navya Blair MD    lactated ringers infusion, 100 mL/hr, Intravenous, Continuous, Navya Blair MD, Last Rate: 100 mL/hr at 01/10/24 1649, 100 mL/hr at 01/10/24 1649    [Transfer Hold] naloxone (NARCAN) 0.04 mg/mL syringe 0.04 mg, 0.04 mg, Intravenous, Q1MIN PRN, Navya Blair MD    [Transfer Hold] ondansetron (ZOFRAN) injection 4 mg, 4 mg, Intravenous, Q4H PRN, Navya Blair MD     "[Transfer Hold] oxyCODONE (ROXICODONE) split tablet 2.5 mg, 2.5 mg, Oral, Q4H PRN **OR** [Transfer Hold] oxyCODONE (ROXICODONE) IR tablet 5 mg, 5 mg, Oral, Q4H PRN, Navya Blair MD, 5 mg at 01/10/24 2155    [Transfer Hold] senna-docusate sodium (SENOKOT S) 8.6-50 mg per tablet 1 tablet, 1 tablet, Oral, HS, Navya Blair MD    Blood Culture:   No results found for: \"BLOODCX\"    Wound Culture:   No results found for: \"WOUNDCULT\"    Ins and Outs:  I/O last 24 hours:  In: 1100 [IV Piggyback:1100]  Out: 800 [Urine:800]          Physical:  Vitals:    24 0535   BP: 128/83   Pulse: 85   Resp: 16   Temp: 98.5 °F (36.9 °C)   SpO2:      Musculoskeletal: right Lower Extremity  Skin intact. No erythema or ecchymosis.  Limb is visibly shortened and externally rotated  TTP over right hip.  Sensation intact to saphenous, sural, tibial, superficial peroneal nerve, and deep peroneal  Motor intact to +FHL/EHL, +ankle dorsi/plantar flexion  Thigh and calf soft and easily compressible, no pain with passive stretch.  No pain over right knee lower leg foot or ankle.  2+ DP pulse  Digits warm and well perfused  Capillary refill < 2 seconds      Assessment:    68 y.o.male status post mechanical fall with right peritrochanteric femur fracture.    Plan:  Nonweightbearing to right lower extremity  Plan for operative fixation today 2024  NPO confirmed  Consent on file  Preoperative antibiotics ordered  Intraoperative TXA ordered  Hb.5 today. Will monitor for ABLA and administer IVF/prbc as indicated for Greater than 2 gram drop or Hgb < 7   PT/OT  Pain control per primary team  DVT ppx Per primary team  Medical management per primary team  Dispo: Ortho will follow    Andrae Mary PA-C     "

## 2024-01-11 NOTE — DISCHARGE INSTR - AVS FIRST PAGE
Discharge Instructions - Orthopedics  Kemal Huddleston 68 y.o. male MRN: [id number]  Unit/Bed#: PACU 2    Weight Bearing Status:                                           Patient to be weightbearing as tolerated to right lower extremity    DVT prophylaxis:  Lovenox in house, aspirin 325 mg twice per day for 6 weeks upon discharge    Pain:  Continue analgesics as directed    Dressing Instructions:   Please keep clean, dry and intact until follow up     Appt Instructions:   If you do not have your appointment, please call the clinic at [phone] to schedule with Dr. Vaca  Otherwise follow up as scheduled.    Contact the office sooner if you experience any increased numbness/tingling in the extremities.

## 2024-01-11 NOTE — PLAN OF CARE
Problem: Potential for Falls  Goal: Patient will remain free of falls  Description: INTERVENTIONS:  - Educate patient/family on patient safety including physical limitations  - Instruct patient to call for assistance with activity   - Consult OT/PT to assist with strengthening/mobility   - Keep Call bell within reach  - Keep bed low and locked with side rails adjusted as appropriate  - Keep care items and personal belongings within reach  - Initiate and maintain comfort rounds  - Make Fall Risk Sign visible to staff  - Offer Toileting every  Hours, in advance of need  - Initiate/Maintain alarm  - Obtain necessary fall risk management equipment:   - Apply yellow socks and bracelet for high fall risk patients  - Consider moving patient to room near nurses station  1/11/2024 1106 by Bonnie Bo RN  Outcome: Progressing  1/11/2024 1105 by Bonnie Bo RN  Outcome: Progressing     Problem: PAIN - ADULT  Goal: Verbalizes/displays adequate comfort level or baseline comfort level  Description: Interventions:  - Encourage patient to monitor pain and request assistance  - Assess pain using appropriate pain scale  - Administer analgesics based on type and severity of pain and evaluate response  - Implement non-pharmacological measures as appropriate and evaluate response  - Consider cultural and social influences on pain and pain management  - Notify physician/advanced practitioner if interventions unsuccessful or patient reports new pain  1/11/2024 1106 by Bonnie Bo RN  Outcome: Progressing  1/11/2024 1105 by Bonnie Bo RN  Outcome: Progressing     Problem: INFECTION - ADULT  Goal: Absence or prevention of progression during hospitalization  Description: INTERVENTIONS:  - Assess and monitor for signs and symptoms of infection  - Monitor lab/diagnostic results  - Monitor all insertion sites, i.e. indwelling lines, tubes, and drains  - Monitor endotracheal if appropriate and nasal secretions for changes in  amount and color  - [place] appropriate cooling/warming therapies per order  - Administer medications as ordered  - Instruct and encourage patient and family to use good hand hygiene technique  - Identify and instruct in appropriate isolation precautions for identified infection/condition  1/11/2024 1106 by Bonnie Bo RN  Outcome: Progressing  1/11/2024 1105 by Bonnie Bo RN  Outcome: Progressing  Goal: Absence of fever/infection during neutropenic period  Description: INTERVENTIONS:  - Monitor WBC    1/11/2024 1106 by Bonnie Bo RN  Outcome: Progressing  1/11/2024 1105 by Bonnie Bo RN  Outcome: Progressing     Problem: SAFETY ADULT  Goal: Patient will remain free of falls  Description: INTERVENTIONS:  - Educate patient/family on patient safety including physical limitations  - Instruct patient to call for assistance with activity   - Consult OT/PT to assist with strengthening/mobility   - Keep Call bell within reach  - Keep bed low and locked with side rails adjusted as appropriate  - Keep care items and personal belongings within reach  - Initiate and maintain comfort rounds  - Make Fall Risk Sign visible to staff  - Offer Toileting every  Hours, in advance of need  - Initiate/Maintain alarm  - Obtain necessary fall risk management equipment:   - Apply yellow socks and bracelet for high fall risk patients  - Consider moving patient to room near nurses station  1/11/2024 1106 by Bonnie Bo RN  Outcome: Progressing  1/11/2024 1105 by Bonnie Bo RN  Outcome: Progressing  Goal: Maintain or return to baseline ADL function  Description: INTERVENTIONS:  -  Assess patient's ability to carry out ADLs; assess patient's baseline for ADL function and identify physical deficits which impact ability to perform ADLs (bathing, care of mouth/teeth, toileting, grooming, dressing, etc.)  - Assess/evaluate cause of self-care deficits   - Assess range of motion  - Assess patient's mobility; develop plan if  impaired  - Assess patient's need for assistive devices and provide as appropriate  - Encourage maximum independence but intervene and supervise when necessary  - Involve family in performance of ADLs  - Assess for home care needs following discharge   - Consider OT consult to assist with ADL evaluation and planning for discharge  - Provide patient education as appropriate  1/11/2024 1106 by Bonnie Bo RN  Outcome: Progressing  1/11/2024 1105 by Bonnie Bo RN  Outcome: Progressing  Goal: Maintains/Returns to pre admission functional level  Description: INTERVENTIONS:  - Perform AM-PAC 6 Click Basic Mobility/ Daily Activity assessment daily.  - Set and communicate daily mobility goal to care team and patient/family/caregiver.   - Collaborate with rehabilitation services on mobility goals if consulted  - Perform Range of Motion  times a day.  - Reposition patient every  hours.  - Dangle patient  times a day  - Stand patient  times a day  - Ambulate patient  times a day  - Out of bed to chair  times a day   - Out of bed for meals  times a day  - Out of bed for toileting  - Record patient progress and toleration of activity level   1/11/2024 1106 by Bonnie Bo RN  Outcome: Progressing  1/11/2024 1105 by Bonnie Bo RN  Outcome: Progressing     Problem: DISCHARGE PLANNING  Goal: Discharge to home or other facility with appropriate resources  Description: INTERVENTIONS:  - Identify barriers to discharge w/patient and caregiver  - Arrange for needed discharge resources and transportation as appropriate  - Identify discharge learning needs (meds, wound care, etc.)  - Arrange for interpretive services to assist at discharge as needed  - Refer to Case Management Department for coordinating discharge planning if the patient needs post-hospital services based on physician/advanced practitioner order or complex needs related to functional status, cognitive ability, or social support system  1/11/2024 1106 by  Bonnie Bo RN  Outcome: Progressing  1/11/2024 1105 by Bonnie Bo RN  Outcome: Progressing     Problem: Knowledge Deficit  Goal: Patient/family/caregiver demonstrates understanding of disease process, treatment plan, medications, and discharge instructions  Description: Complete learning assessment and assess knowledge base.  Interventions:  - Provide teaching at level of understanding  - Provide teaching via preferred learning methods  1/11/2024 1106 by Bonine Bo RN  Outcome: Progressing  1/11/2024 1105 by Bonnie Bo RN  Outcome: Progressing

## 2024-01-11 NOTE — UTILIZATION REVIEW
Initial Clinical Review    Admission: Date/Time/Statement:   Admission Orders (From admission, onward)       Ordered        01/10/24 1340  Inpatient Admission  Once                          Orders Placed This Encounter   Procedures    Inpatient Admission     Standing Status:   Standing     Number of Occurrences:   1     Order Specific Question:   Level of Care     Answer:   Med Surg [16]     Order Specific Question:   Bed Type     Answer:   Trauma [7]     Order Specific Question:   Estimated length of stay     Answer:   More than 2 Midnights     Order Specific Question:   Certification     Answer:   I certify that inpatient services are medically necessary for this patient for a duration of greater than two midnights. See H&P and MD Progress Notes for additional information about the patient's course of treatment.     ED Arrival Information       Expected   -    Arrival   1/10/2024 11:41    Acuity   Urgent              Means of arrival   Ambulance    Escorted by   [facility]    Service   Trauma    Admission type   Emergency              Arrival complaint   *EMS/SUB*             Chief Complaint   Patient presents with    Fall     Pt. Outside, tripped over feet, fell and approx on ground for 10 mins. No head injury, no LOC. Pt. Reports right hip and knee pain       Initial Presentation: 68 y.o. male with hx HTN who presents to ED from home via EMS s/p mechanical fall whern pt tripped over his feet. Pt landed on R hip, unable to get up on his own .Denies head strike, LOC .  Pt reports R hip pain . NO AC/AP. . ON exam, GCS 15 . +2 pedal pulses bilat . RLE: shortened, externally rotated. Mild tenderness to R lateral hip. Soft compartments. Normal sensation throughout. 5/5 strength with dorsiflexion and plantarflexion. Decreased ROM with hip flexion and extension due to pain. No midline cervical, thoracic or lumbar tenderness or step-offs.  Moving other 3 extremities spontaneously and without pain, normal range of  motion, neurovascularly intact. XR shows nondisplaced intertrochanteric right femoral fracture. Pt admitted as Inpatient by trauma service with Right intertrochanteric femur fracture s/p fall. Plan - ortho consult, NWKELLEE  RLMARGARETTE . Neurovasc checks . Multimodal pain control. DVT ppx- Lovenox and SCD'sd . Reg diet then NPO after MN for OR tomorrow. IVF . Holding home Amlodipine and Olmesartan-HCTZ until after OR . TXA 10 mg/kg now and intraopertively . PT/OT .     Ortho consult- right peritrochanteric femur fracture. consented for intramedullary nailing of the right femur for tomorrow.  The patient will be n.p.o. at midnight.  Currently nonweightbearing.  Patient to be started on DVT prophylaxis . Pain control.     Date: 1/11   Day 2:     Patient states that he has been having trouble with his gait due to bilateral knee arthritis.  He has not sought any treatment for bilateral knee arthritis.  He states that he probably should be walking with an assistive device as he is not interested in knee replacement at this time.  Patient denies any numbness or paresthesias in the right lower extremity.  Denies any antecedent right hip pain.  On exam the patient's right lower extremity shortened and externally rotated.  Skin is intact without any lacerations or abrasions.  The sensation is intact to light touch in the superficial peroneal, deep peroneal, sural, saphenous, plantar nerve distributions.  Tibialis anterior, extensor hallux longus, gastrocnemius muscles are intact.  +2 dorsalis pedis and posterior tibial pulses.  Compartments are soft compressible. OR today .   1/11/24 @ 0801  Surgical fixation of right peritrochanteric femur fracture with an intramedullary nail   General anesthesia , ETT.   Operative findings:  Patient had a right peritrochanteric femur fracture with the basicervical in nature.  It was able to be surgically fixated using a intramedullary device using the fracture top table for reduction aid.  The  patient's hip was stable after fixation.  Rotational profile confirmed.    POD #0- WBAT RLE .  24 hours of postoperative antibiotics for infection prophylaxis.  The patient will be started on Lovenox while in the hospital and will be discharged on aspirin 325 mg twice daily for DVT prophylaxis for 6 weeks     Date: 1/12 POD #1    Day 3: Has surpassed a 2nd midnight with active treatments and services, which include :POD #1- Needs to work with PT/OT prior to dispo . WBAT RLE . Home BP med restarted post operatively . Drsg C/D/I . Mild lupillo-incisional tenderness .   Competed Iv abx today . Hgb 13.0 today from 15.2  on 1/10 .   CBC, BMP in am tomorrow. IVF infusing .    ED Triage Vitals   Temperature Pulse Respirations Blood Pressure SpO2   01/10/24 1151 01/10/24 1151 01/10/24 1151 01/10/24 1151 01/10/24 1151   98 °F (36.7 °C) 68 18 131/70 94 %      Temp Source Heart Rate Source Patient Position - Orthostatic VS BP Location FiO2 (%)   01/10/24 1151 01/10/24 1151 01/10/24 1630 01/10/24 1630 --   Oral Monitor Sitting Right arm       Pain Score       01/10/24 1152       4          Wt Readings from Last 1 Encounters:   01/11/24 124 kg (274 lb)     Additional Vital Signs:   Date/Time Temp Pulse Resp BP MAP (mmHg) SpO2 O2 Flow Rate (L/min) O2 Device Cardiac (WDL) Patient Position - Orthostatic VS   01/11/24 15:14:20 98.4 °F (36.9 °C) 73 20 110/70 83 93 % -- -- -- --   01/11/24 12:40:08 -- 75 -- 113/79 90 93 % -- -- -- --   01/11/24 1200 -- 72 -- -- -- 92 % -- -- -- --   01/11/24 1100 -- 70 -- -- -- 88 % Abnormal  -- -- -- --   01/11/24 10:14:18 -- 81 -- 114/77 89 89 % Abnormal  -- -- -- --   01/11/24 10:10:18 98.2 °F (36.8 °C) -- -- 133/70 91 -- -- -- -- --   01/11/24 1000 -- 78 -- 119/64 86 89 % Abnormal  -- -- -- --   01/11/24 0956 98.3 °F (36.8 °C) 80 16 119/64 86 93 % -- None (Room air) WDL --   01/11/24 0945 -- 78 18 117/67 87 93 % 2 L/min Nasal cannula -- --   01/11/24 0930 -- 76 20 115/62 83 92 % 4 L/min Nasal cannula  -- --   01/11/24 0915 -- 78 -- 126/70 90 94 % 5 L/min Nasal cannula -- --   01/11/24 0900 -- 74 18 146/78 106 91 % 6 L/min Simple mask -- --   01/11/24 0858 98.8 °F (37.1 °C) 78 17 147/79 106 92 % 6 L/min Simple mask WDL --   01/11/24 0535 98.5 °F (36.9 °C) 85 16 128/83 -- -- -- -- -- --   01/10/24 19:52:23 -- 85 -- -- -- 96 % -- -- -- --   01/10/24 19:47:39 98.5 °F (36.9 °C) 88 -- 128/83 98 94 % -- -- -- --   01/10/24 1630 -- 72 16 141/76 102 94 % -- None (Room air) -- Sitting   01/10/24 1430 -- 75 16 130/60 86 95 % -- -- --      Date and Time R Radial Pulse L Radial Pulse R Pedal Pulse L Pedal Pulse   01/11/24 1104 +2 +2 +2 +2   01/11/24 0956 -- -- +3 +3   01/11/24 0858 -- -- +3 +3   01/10/24 2100 +2 +2 +2 +2     Trauma Secondary Assessment - Moreno Coma Scale    Date and Time Eye Opening Best Verbal Response Best Motor Response Hollis Coma Scale Score   01/10/24 1151 4 5 6 15           Pertinent Labs/Diagnostic Test Results:   XR femur 2 vw right   Final Result by Martin Spencer MD (01/11 0922)      Fluoroscopic guidance provided for procedure guidance.  Please refer to the separate procedure notes for additional details.            Workstation performed: [id number]         XR femur 2 views RIGHT   ED Interpretation by Bird Bobo MD (01/10 7505)   Image was independently interpreted by myself and showed intertrochanteric fracture of the Right femoral neck.        Final Result by Florin Cohen MD (01/10 1925)      Acute essentially nondisplaced right intertrochanteric femoral fracture            Workstation performed: [id number]         XR pelvis ap only 1 or 2 vw   ED Interpretation by Bird Bobo MD (01/10 0153)   Image was independently interpreted by myself and showed intertrochanteric fracture of the Right femoral neck.        Final Result by Florin Cohen MD (01/10 1561)      Acute essentially nondisplaced intertrochanteric right femoral fracture. Pelvis appears intact       Workstation performed: [id number]         XR chest 1 view   ED Interpretation by Bird Bobo MD (01/10 1315)   Image was independently interpreted by myself and showed no acute concerns of cardiopulmonary disease at this time.        Final Result by Florin Cohen MD (01/10 1340)      No acute cardiopulmonary disease.                  Workstation performed: [id number]         XR knee 1 or 2 vw right   ED Interpretation by Bird Bobo MD (01/10 1315)   Image was independently interpreted by myself and showed no acute concerns or fractures at this time.        Final Result by Florin Cohen MD (01/10 1335)      Arthritis. No fracture.            Workstation performed: [id number]               Results from last 7 days   Lab Units 01/11/24  0633 01/10/24  1347   WBC Thousand/uL 8.97 9.62   HEMOGLOBIN g/dL 14.5 15.2   HEMATOCRIT % 43.2 46.0   PLATELETS Thousands/uL 210 208   NEUTROS ABS Thousands/µL 6.42 8.40*         Results from last 7 days   Lab Units 01/11/24  0633 01/10/24  1347   SODIUM mmol/L 138 138   POTASSIUM mmol/L 3.8 3.8   CHLORIDE mmol/L 104 101   CO2 mmol/L 26 31   ANION GAP mmol/L 8 6   BUN mg/dL 12 12   CREATININE mg/dL 0.80 0.80   EGFR ml/min/1.73sq m 91 91   CALCIUM mg/dL 8.9 9.3   MAGNESIUM mg/dL 2.0  --      Results from last 7 days   Lab Units 01/10/24  1347   AST U/L 32   ALT U/L 35   ALK PHOS U/L 73   TOTAL PROTEIN g/dL 6.3*   ALBUMIN g/dL 3.9   TOTAL BILIRUBIN mg/dL 0.52         Results from last 7 days   Lab Units 01/11/24  0633 01/10/24  1347   GLUCOSE RANDOM mg/dL 115 116               Results from last 7 days   Lab Units 01/10/24  1347   PROTIME seconds 13.6   INR  0.98   PTT seconds 27                         ED Treatment:   Medication Administration from 01/10/2024 1141 to 01/10/2024 1924         Date/Time Order Dose Route Action Comments     01/10/2024 1335 EST lidocaine (LIDODERM) 5 % patch 1 patch 1 patch Topical Medication Applied --     01/10/2024 1615 EST  lactated ringers bolus 1,000 mL -- Intravenous Canceled Entry --     01/10/2024 1615 EST lactated ringers infusion -- Intravenous Canceled Entry --     01/10/2024 1629 EST tranexamic Acid 1,250 mg in sodium chloride 0.9 % 100 mL IVPB 0 mg Intravenous Stopped --     01/10/2024 1619 EST tranexamic Acid 1,250 mg in sodium chloride 0.9 % 100 mL IVPB 1,250 mg Intravenous New Bag not given by previous staff     01/10/2024 1647 EST enoxaparin (LOVENOX) subcutaneous injection 30 mg 30 mg Subcutaneous Given not given by prior staff     01/10/2024 1439 EST acetaminophen (TYLENOL) tablet 650 mg 650 mg Oral Not Given pt refused at this time     01/10/2024 1621 EST lidocaine (LIDODERM) 5 % patch 1 patch 1 patch Topical Not Given --     01/10/2024 1639 EST lactated ringers bolus 1,000 mL 0 mL Intravenous Stopped --     01/10/2024 1439 EST lactated ringers bolus 1,000 mL 1,000 mL Intravenous New Bag --     01/10/2024 1649 EST lactated ringers infusion 100 mL/hr Intravenous New Bag --          Past Medical History:   Diagnosis Date    Hypertension      Present on Admission:   Fall   HTN (hypertension)      Admitting Diagnosis: Right hip pain [M25.551]  Right knee pain [M25.561]  Right femoral fracture (HCC) [S72.91XA]  Age/Sex: 68 y.o. male  Admission Orders:  Scheduled Medications:  acetaminophen, 650 mg, Oral, Q6H MARYJO  amLODIPine, 5 mg, Oral, Daily  cefazolin, 2,000 mg, Intravenous, Q8H   And  cefazolin, 1,000 mg, Intravenous, Q8H  enoxaparin, 30 mg, Subcutaneous, Q12H  losartan, 50 mg, Oral, Daily   And  hydrochlorothiazide, 12.5 mg, Oral, Daily  senna-docusate sodium, 1 tablet, Oral, HS  tranexamic acid, 1,000 mg, Intravenous, On Call To OR      Continuous IV Infusions:  lactated ringers, 100 mL/hr, Intravenous, ContinuousStart: 01/10/24 1445   lactated ringers, 50 mL/hr, Intravenous, ContinuousStart: 01/11/24 1015       PRN Meds:  HYDROmorphone, 0.2 mg, Intravenous, Q2H PRN  labetalol, 10 mg, Intravenous, Q15 Min  PRN  naloxone, 0.04 mg, Intravenous, Q1MIN PRN  ondansetron, 4 mg, Intravenous, Q4H PRN  oxyCODONE, 2.5 mg, Oral, Q4H PRN   Or  oxyCODONE, 5 mg, Oral, Q4H PRN x1 1/10   fentaNYL (SUBLIMAZE) injection 50 mcg  Dose: 50 mcg  Freq: Every 5 minutes PRN Route: IV  PRN Reason: Pain - PACU  PRN Comment: Breakthrough - first line x2 1/11  Start: 01/11/24 0843 End: 01/11/24 0927         OOB to chair TID   SCD   WBAT RLE    Reg diet 1/11    IP CONSULT TO ORTHOPEDIC SURGERY    Network Utilization Review Department  ATTENTION: Please call with any questions or concerns to [phone] and carefully listen to the prompts so that you are directed to the right person. All voicemails are confidential.   For Discharge needs, contact Care Management DC Support Team at [phone] opt. 2  Send all requests for admission clinical reviews, approved or denied determinations and any other requests to dedicated fax number below belonging to the campus where the patient is receiving treatment. List of dedicated fax numbers for the Facilities:  FACILITY NAME UR FAX NUMBER   ADMISSION DENIALS (Administrative/Medical Necessity) [phone]   DISCHARGE SUPPORT TEAM (NETWORK) [phone]   PARENT CHILD HEALTH (Maternity/NICU/Pediatrics) [phone]   [facility] [phone]   [facility] [phone]   Count includes the [facility] [phone]   [facility] [phone]   [facility] [phone]   [facility] [phone]   [facility] [phone]   [facility] [phone]   [facility] [phone]   [facility] [phone]   [facility] [phone]

## 2024-01-11 NOTE — PLAN OF CARE

## 2024-01-11 NOTE — ANESTHESIA POSTPROCEDURE EVALUATION
Post-Op Assessment Note    CV Status:  Stable  Pain Score: 0    Pain management: adequate       Mental Status:  Sleepy and arousable   Hydration Status:  Stable   PONV Controlled:  Controlled   Airway Patency:  Patent     Post Op Vitals Reviewed: Yes      Staff: CRNA               BP   147/78   Temp   98.8   Pulse  78   Resp   14   SpO2   93

## 2024-01-12 LAB
ANION GAP SERPL CALCULATED.3IONS-SCNC: 5 MMOL/L
BASOPHILS # BLD AUTO: 0.01 THOUSANDS/ÂΜL (ref 0–0.1)
BASOPHILS NFR BLD AUTO: 0 % (ref 0–1)
BUN SERPL-MCNC: 14 MG/DL (ref 5–25)
CALCIUM SERPL-MCNC: 8.5 MG/DL (ref 8.4–10.2)
CHLORIDE SERPL-SCNC: 103 MMOL/L (ref 96–108)
CO2 SERPL-SCNC: 28 MMOL/L (ref 21–32)
CREAT SERPL-MCNC: 0.75 MG/DL (ref 0.6–1.3)
EOSINOPHIL # BLD AUTO: 0.01 THOUSAND/ÂΜL (ref 0–0.61)
EOSINOPHIL NFR BLD AUTO: 0 % (ref 0–6)
ERYTHROCYTE [DISTWIDTH] IN BLOOD BY AUTOMATED COUNT: 13 % (ref 11.6–15.1)
GFR SERPL CREATININE-BSD FRML MDRD: 93 ML/MIN/1.73SQ M
GLUCOSE SERPL-MCNC: 122 MG/DL (ref 65–140)
HCT VFR BLD AUTO: 39.8 % (ref 36.5–49.3)
HGB BLD-MCNC: 13 G/DL (ref 12–17)
IMM GRANULOCYTES # BLD AUTO: 0.06 THOUSAND/UL (ref 0–0.2)
IMM GRANULOCYTES NFR BLD AUTO: 1 % (ref 0–2)
LYMPHOCYTES # BLD AUTO: 0.93 THOUSANDS/ÂΜL (ref 0.6–4.47)
LYMPHOCYTES NFR BLD AUTO: 10 % (ref 14–44)
MCH RBC QN AUTO: 30.1 PG (ref 26.8–34.3)
MCHC RBC AUTO-ENTMCNC: 32.7 G/DL (ref 31.4–37.4)
MCV RBC AUTO: 92 FL (ref 82–98)
MONOCYTES # BLD AUTO: 1.03 THOUSAND/ÂΜL (ref 0.17–1.22)
MONOCYTES NFR BLD AUTO: 11 % (ref 4–12)
NEUTROPHILS # BLD AUTO: 7.33 THOUSANDS/ÂΜL (ref 1.85–7.62)
NEUTS SEG NFR BLD AUTO: 78 % (ref 43–75)
NRBC BLD AUTO-RTO: 0 /100 WBCS
PLATELET # BLD AUTO: 188 THOUSANDS/UL (ref 149–390)
PMV BLD AUTO: 9.3 FL (ref 8.9–12.7)
POTASSIUM SERPL-SCNC: 3.8 MMOL/L (ref 3.5–5.3)
RBC # BLD AUTO: 4.32 MILLION/UL (ref 3.88–5.62)
SODIUM SERPL-SCNC: 136 MMOL/L (ref 135–147)
WBC # BLD AUTO: 9.37 THOUSAND/UL (ref 4.31–10.16)

## 2024-01-12 PROCEDURE — 99024 POSTOP FOLLOW-UP VISIT: CPT | Performed by: STUDENT IN AN ORGANIZED HEALTH CARE EDUCATION/TRAINING PROGRAM

## 2024-01-12 PROCEDURE — 99232 SBSQ HOSP IP/OBS MODERATE 35: CPT | Performed by: SURGERY

## 2024-01-12 PROCEDURE — 85025 COMPLETE CBC W/AUTO DIFF WBC: CPT

## 2024-01-12 PROCEDURE — 80048 BASIC METABOLIC PNL TOTAL CA: CPT

## 2024-01-12 PROCEDURE — 97163 PT EVAL HIGH COMPLEX 45 MIN: CPT

## 2024-01-12 PROCEDURE — 97116 GAIT TRAINING THERAPY: CPT

## 2024-01-12 PROCEDURE — 97167 OT EVAL HIGH COMPLEX 60 MIN: CPT

## 2024-01-12 PROCEDURE — 97535 SELF CARE MNGMENT TRAINING: CPT

## 2024-01-12 RX ADMIN — ACETAMINOPHEN 650 MG: 325 TABLET, FILM COATED ORAL at 00:48

## 2024-01-12 RX ADMIN — ENOXAPARIN SODIUM 30 MG: 30 INJECTION SUBCUTANEOUS at 00:48

## 2024-01-12 RX ADMIN — ACETAMINOPHEN 650 MG: 325 TABLET, FILM COATED ORAL at 12:37

## 2024-01-12 RX ADMIN — ACETAMINOPHEN 650 MG: 325 TABLET, FILM COATED ORAL at 05:13

## 2024-01-12 RX ADMIN — SODIUM CHLORIDE, SODIUM LACTATE, POTASSIUM CHLORIDE, AND CALCIUM CHLORIDE 50 ML/HR: .6; .31; .03; .02 INJECTION, SOLUTION INTRAVENOUS at 16:30

## 2024-01-12 RX ADMIN — LOSARTAN POTASSIUM 50 MG: 50 TABLET, FILM COATED ORAL at 08:06

## 2024-01-12 RX ADMIN — AMLODIPINE BESYLATE 5 MG: 5 TABLET ORAL at 08:06

## 2024-01-12 RX ADMIN — CEFAZOLIN SODIUM 1000 MG: 1 SOLUTION INTRAVENOUS at 00:08

## 2024-01-12 RX ADMIN — HYDROCHLOROTHIAZIDE 12.5 MG: 12.5 TABLET ORAL at 08:06

## 2024-01-12 RX ADMIN — ACETAMINOPHEN 650 MG: 325 TABLET, FILM COATED ORAL at 18:33

## 2024-01-12 RX ADMIN — ENOXAPARIN SODIUM 30 MG: 30 INJECTION SUBCUTANEOUS at 14:26

## 2024-01-12 NOTE — ASSESSMENT & PLAN NOTE
1/10/24: Mechanical trip and fall while walking, nondisplaced right intertrochanteric femur fracture present on admission.  TXA given in ED  Ortho consult - s/p OR, 1/11/2024 for intramedullary nailing  Needs to work with PT/OT prior to dispo  Nonweightbearing right lower extremity  Neurovascular checks every 4 hours  Multimodal analgesia  Bowel regimen  DVT prophylaxis: Lovenox, SCDs  Diet: Regular house

## 2024-01-12 NOTE — OCCUPATIONAL THERAPY NOTE
Occupational Therapy Evaluation     Patient Name: Kemal Huddleston  Today's Date: 1/12/2024  Problem List  Active Problems:    HTN (hypertension)    Fall    Nondisplaced intertrochanteric fracture of right femur, initial encounter for closed fracture (HCC)    Past Medical History  Past Medical History:   Diagnosis Date    Hypertension      Past Surgical History  Past Surgical History:   Procedure Laterality Date    COLONOSCOPY  04/27/2015    MT OPTX FEM SHFT FX W/INSJ IMED IMPLT W/WO SCREW Right 1/11/2024    Procedure: INSERTION NAIL IM FEMUR ANTEGRADE (TROCHANTERIC), and all associated procedures;  Surgeon: Frandy Vaca DO;  Location: [facility];  Service: Orthopedics    TONSILLECTOMY      VASECTOMY           01/12/24 0957   OT Last Visit   OT Visit Date 01/12/24   Note Type   Note type Evaluation  (and Treatment)   Pain Assessment   Pain Assessment Tool 0-10   Pain Score 4  (w/ activity)   Pain Location/Orientation Orientation: Right;Location: Hip   Pain Onset/Description Onset: Ongoing;Descriptor: Aching   Effect of Pain on Daily Activities limits speed and independence of ADLs/mobility, activity tolerance   Patient's Stated Pain Goal No pain   Hospital Pain Intervention(s) Repositioned;Ambulation/increased activity;Emotional support   Restrictions/Precautions   Weight Bearing Precautions Per Order Yes   RLE Weight Bearing Per Order WBAT  (s/p IM Nail 1/11)   Other Precautions Chair Alarm;Bed Alarm;WBS;Fall Risk;Pain   Home Living   Type of Home House   Home Layout Two level;Bed/bath upstairs;1/2 bath on main level;Stairs to enter with rails  (2 vs 3 MICHAEL to enter)   Bathroom Shower/Tub Walk-in shower   Bathroom Toilet Standard   Bathroom Equipment   (none per pt)   Bathroom Accessibility Accessible   Home Equipment   (none per pt)   Additional Comments Pt reports couch would be FFSU at home   Prior Function   Level of [place] Independent with ADLs;Independent with functional mobility;Independent with  "IADLS   Lives With Spouse   Receives Help From Family   IADLs Independent with driving;Independent with meal prep;Independent with medication management   Falls in the last 6 months 1 to 4  (1 as reason for admission)   Vocational Retired  ()   Comments Pt reports he was fully (I) PTA, no AD for mobility. Goes for 2 mile walks around his neighborhood baseline   Lifestyle   Autonomy Pt lives w/ spouse in a 2 level house and is fully (I) PTA, no AD, (+) falls, (+)    Reciprocal Relationships Supportive spouse who is available PRN   Service to Others Retired    Intrinsic Gratification Pt enjoys staying active, getting outside   General   Additional Pertinent History Pt admitted s/p fall at home resulting in R hip fx. POD # 1 IM Nail, WBAT per ortho. PMH includes: HTN, severe obesity   Family/Caregiver Present No   Subjective   Subjective \"You just have to tell me to shutup\" \"I'm a polish anal \"   ADL   Where Assessed Other (Comment)  (BSC)   Eating Assistance 7  Independent   Eating Deficit Beverage management   Grooming Assistance 6  Modified Independent   UB Bathing Assistance 6  Modified Independent   LB Bathing Assistance 3  Moderate Assistance   UB Dressing Assistance 6  Modified independent   LB Dressing Assistance 3  Moderate Assistance   Toileting Assistance  4  Minimal Assistance   Toileting Deficit Setup;Steadying;Verbal cueing;Supervison/safety;Increased time to complete;Bedside commode;Perineal hygiene   Bed Mobility   Supine to Sit 3  Moderate assistance   Additional items Assist x 1;HOB elevated;Bedrails;Increased time required;Verbal cues;LE management   Additional Comments Able to sit EOB w/ S, OOB to recliner at end of session   Transfers   Sit to Stand 4  Minimal assistance   Additional items Assist x 1;Increased time required;Verbal cues  (extensive cueing for hand and BLE placement)   Stand to Sit 4  Minimal assistance   Additional items Assist x " 1;Increased time required;Verbal cues  (cues for body positioning, hand placement)   Additional Comments Pt required extensive VC to problem solve appropriate BLE and BUE placement for safe functional transfers this session. Teachback method used to promote effective carryover of education   Functional Mobility   Functional Mobility 4  Minimal assistance  (A x 2)   Additional Comments Short household distance around bed to BSC w/ min A x 2 and RW. Pt required min A for trunk steadying, A for targeted advancement of RLE, occasional RW management and VC for steppage technique   Additional items Rolling walker   Balance   Static Sitting Good   Dynamic Sitting Fair +   Static Standing Fair -   Dynamic Standing Poor +   Activity Tolerance   Activity Tolerance Patient limited by fatigue;Patient limited by pain   Medical Staff Made Aware Care coordinated w/ PT Moise   Nurse Made Aware yes, RN Shreya ok to see pt and udpated on outcomes   RUE Assessment   RUE Assessment WFL   LUE Assessment   LUE Assessment WFL   Hand Function   Gross Motor Coordination Functional   Fine Motor Coordination Functional   Sensation   Light Touch No apparent deficits   Vision-Basic Assessment   Current Vision Wears glasses all the time   Cognition   Overall Cognitive Status WFL   Arousal/Participation Alert;Cooperative   Attention Attends with cues to redirect  (distractible, tangential)   Orientation Level Oriented X4   Memory Within functional limits   Following Commands Follows one step commands with increased time or repetition   Comments Pleasant and motivated. Distractibility varied throughout session, required cues to attend to task/redirection at times. Required repetition of education for carryover, benefitted from teachback method. Cues for pacing.   Assessment   Limitation Decreased ADL status;Decreased Safe judgement during ADL;Decreased endurance;Decreased self-care trans;Decreased high-level ADLs  (pain, balance, fxnl mobility, act  malvin, fxnl reach, standing malvin, and strength, attention to task, safety awareness, insight, and pacing, initiation/termination of conversation)   Prognosis Good   Assessment Pt is a 68 y.o. male seen for OT evaluation s/p admit to [facility] on 1/10/2024 s/p fall at home resulting in R hip fx. POD # 1 IM Nail RLE. Prior to admission, pt was living with spouse in a 2 level house, (I) with ADLs, (I) with IADLs, (+) falls, (+) . Personal and environmental factors affecting patient at time of evaluation include limited social support, inaccessible home environment, difficulty completing ADLs, and difficulty completing IADLs. Personal factors supporting patient at time of evaluation include age, (I) PLOF, supportive spouse, and attitude towards recovery. Based upon this evaluation, pt is functioning below baseline. Pt will benefit from continued skilled inpatient OT to maximize safety, level of independence overall performance in ADLs, functional transfers, functional mobility to return to functional baseline/highest level of function.   Goals   Patient Goals to get home and be able to get upstairs to my bedroom   LT Time Frame 10-14   Long Term Goal #1 see goals below   Plan   Treatment Interventions ADL retraining;Functional transfer training;Endurance training;Patient/family training;Equipment evaluation/education;Compensatory technique education;Continued evaluation;Activityengagement;Energy conservation   Goal Expiration Date 01/22/24   OT Treatment Day 0   OT Frequency 3-5x/wk   Discharge Recommendation   Rehab Resource Intensity Level, OT II (Moderate Resource Intensity)  (pt may progress to level III pending progress towards goals, FFSU and level of social support available at home)   Equipment Recommended Bedside commode   Commode Type Standard   Additional Comments  Pt seen as a co-eval with PT due to the patient's co-morbidities and clinically unstable presentation indicated by chart review.  During session, pt benefited from two skilled therapists due to extensive physical assistance to achieve transitional movements and pt's decreased activity tolerance.   Additional Comments 2 The patient's raw score on the AM-PAC Daily Activity inpatient short form is 19, standardized score is 40.22, greater than 39.4. From an OT standpoint, patients at this level may benefit from d/c to Home with home health rehabilitation.   AM-PAC Daily Activity Inpatient   Lower Body Dressing 2   Bathing 2   Toileting 3   Upper Body Dressing 4   Grooming 4   Eating 4   Daily Activity Raw Score 19   Daily Activity Standardized Score (Calc for Raw Score >=11) 40.22   AM-PAC Applied Cognition Inpatient   Following a Speech/Presentation 4   Understanding Ordinary Conversation 4   Taking Medications 4   Remembering Where Things Are Placed or Put Away 4   Remembering List of 4-5 Errands 4   Taking Care of Complicated Tasks 4   Applied Cognition Raw Score 24   Applied Cognition Standardized Score 62.21   Additional Treatment Session   Start Time 0937   End Time 0957   Treatment Assessment Pt seen for additional OT treatment to complete toileting on BS. Pt able to manage urinal mod (I) on BSC. Pt required min A to safely transfer from BS to initiate lupillo care in stance. Pt was able to complete lupillo care in stance w/ min A for steadying and demonstrated improved safety w/ standing tasks, able to remove both hands from walker to complete hygiene. Pt also demonstrated improved mobility to recliner and was able to use teachback to educate PCA on safe mobility technique. Pt will benefit from continued skilled inpatient OT to maximize safety, level of independence overall performance in ADLs, functional transfers, functional mobility to return to functional baseline/highest level of function.   Additional Treatment Day 1   End of Consult   Patient Position at End of Consult Bedside chair;Bed/Chair alarm activated;All needs within reach    Nurse Communication Nurse aware of consult     GOALS:    *Goals established to promote patient goal of to go home and be able to get upstairs to my bedroom:      *Patient will perform grooming tasks standing at sink with (S) in order to increase overall independence    *LB ADL with Min (A) using AE prn for inc'd independence with self care    *Toileting with Min (A) for clothing management and hygiene to increase hygiene/thoroughness in order to reduce caregiver burden    *Pt will demonstrate use of long handled AE during 100% of tx sessions for increased ADL safety and independence following D/C     *Patient will verbalize and demonstrate use of energy conservation/deep breathing techniques and work simplification skills during functional activities with no verbal cues.     *ADL transfers with (S) for inc'd independence with ADLs/purposeful tasks    *Bed mobility- (S) for inc'd independence to manage own comfort and initiate EOB & OOB purposeful tasks    *Patient will increase functional mobility to and from bathroom with rolling walker with supervision to increase independence with toileting    *Increase stand tolerance x 10  m for inc'd tolerance with standing purposeful tasks including meal prep/household management     Araseli Negron, YANIRA, OTR/L    [place] License [id number]  [place] License [id number]

## 2024-01-12 NOTE — PHYSICAL THERAPY NOTE
PHYSICAL THERAPY EVALUATION  NAME:  Kemal Huddleston  DATE: 01/12/24    AGE:   68 y.o.  Mrn:   [id number]  Principal problem: Active Problems:    HTN (hypertension)    Fall    Nondisplaced intertrochanteric fracture of right femur, initial encounter for closed fracture (HCC)      Vitals:    01/12/24 0600 01/12/24 0755 01/12/24 0800 01/12/24 0805   BP:  125/68     Pulse:  76  82   Resp:  14     Temp:  97.8 °F (36.6 °C)     TempSrc:       SpO2:  91% 93% 93%   Weight: 124 kg (274 lb 0.5 oz)      Height:           Length Of Stay: 2  Performed at least 2 patient identifiers during session: Name and Birthday  PHYSICAL THERAPY EVALUATION :    01/12/24 0908   PT Last Visit   PT Visit Date 01/12/24   Note Type   Note type Evaluation   Pain Assessment   Pain Assessment Tool 0-10   Pain Score 4  (@ rest, increased w/ activity)   Pain Location/Orientation Orientation: Right   Effect of Pain on Daily Activities limits speed and indep of mobility, +gait deviations, limited AROM/PROM R hip and knee   Patient's Stated Pain Goal No pain   Hospital Pain Intervention(s) Repositioned;Ambulation/increased activity;Emotional support;Cold applied   Restrictions/Precautions   Weight Bearing Precautions Per Order Yes   RLE Weight Bearing Per Order WBAT   Other Precautions Fall Risk;Pain   Home Living   Type of Home House   Home Layout Two level;Bed/bath upstairs;Stairs to enter without rails  (2 vs 3 MICHAEL, neither have railing)   Home Equipment (S)    (none prior to admission)   Prior Function   Level of [place] Independent with ADLs;Independent with functional mobility   Lives With Spouse   Falls in the last 6 months 1 to 4   Comments was walking in the community prior to fall   General   Additional Pertinent History #1 surgical fixation of right peritrochanteric femur fracture with an intramedullary nail 1/12/2024   Family/Caregiver Present No   Cognition   Overall Cognitive Status WFL   Arousal/Participation Alert   Orientation Level  "Oriented X4   Following Commands Follows one step commands inconsistently  (Pt frequently needs redirection for multiple tasks)   Subjective   Subjective \" I am so embarrased, fat joe falls down\". Pt reports tht he \"waddled when he walked porior to admission\"   RUE Assessment   RUE Assessment WFL   LUE Assessment   LUE Assessment WFL   RLE Assessment   RLE Assessment   (LE edema, B lower leg hemosiderin staining)   RLE Overall AROM   R Hip Flexion limited flexion < 90   R Hip ABduction limited to 0-15   R Knee Flexion limited to < 90   Strength RLE   R Hip Flexion 2+/5   R Hip ABduction 2-/5   R Knee Extension 3-/5   R Ankle Dorsiflexion 3/5   LLE Assessment   LLE Assessment   (LE edema, B lower leg hemosiderin staining)   Strength LLE   L Hip Flexion 4/5   L Knee Extension 4/5   L Ankle Dorsiflexion 4/5   Vision-Basic Assessment   Current Vision Wears glasses all the time   Coordination   Movements are Fluid and Coordinated 0   Coordination and Movement Description antalgic   Light Touch   RLE Light Touch Grossly intact   LLE Light Touch Grossly intact   Bed Mobility   Supine to Sit 3  Moderate assistance   Additional items Assist x 1;HOB elevated;Bedrails;Increased time required;Verbal cues;LE management   Transfers   Sit to Stand 4  Minimal assistance   Additional items Assist x 1;Increased time required;Verbal cues;Armrests  (verbal instruction for hand and leg placement)   Stand to Sit 4  Minimal assistance   Additional items Assist x 1;Increased time required;Verbal cues;Armrests  (verbal instruction for hand and leg placement)   Additional Comments Evette's Bariatric body type APPLE   Ambulation/Elevation   Gait pattern Decreased R stance;Antalgic;Excessively slow;Step to   Gait Assistance 4  Minimal assist   Additional items Assist x 2;Tactile cues;Verbal cues  (A for 2 for steadying support, walker management, foot placement, sequencing. > 50% verbal instruction and redirection during gait trial) "   Assistive Device Rolling walker  (wide)   Distance 10' total but needs self regulated standing rest x1 min mid gait trial   Stair Management Assistance Not tested   Balance   Static Sitting Good   Static Standing Fair -   Ambulatory Poor +   Endurance Deficit   Endurance Deficit Yes   Endurance Deficit Description needs self regulated rest break mid ambulation. HR in 100's during amb   Activity Tolerance   Activity Tolerance Patient limited by pain;Patient limited by fatigue   Medical Staff Made Aware care coordination w/ Jeffyee from OT; Messaged case management; care coord with trauma   Nurse Made Aware spoke to Shreya RN     Assessment:   Pt is a 68 y.o. male seen for PT evaluation s/p admit to [facility] on 1/10/2024 w/s/p fall, had above surgery and is POD1, WBAT on RLE. .  Order placed for PT.      Prior to admission: Pt lived w/spouse in 2 story home w/MICHAEL, did not use DME prior to this admission. Upon evaluation: Pt needed A of 1 for bed mobility, min A for transfers, but A of 2 for amb short distances w/ wide walker. He requires >50% instruction at times for mobility, sequencing, walker management, attending to task.    Pt's clinical presentation is currently unstable/unpredictable given the functional mobility deficits above, especially (but not limited to) weakness, decreased ROM, gait deviations, and pain, and combined with medical complications including hypertension , low SpO2 values, and impulsivity during admission.  Pt IS NOT at his mobility baseline.  He is at risk for falls based on hx of falls, impaired balance, impaired coordination, impaired judgement, and obesity    During this admission, pt would benefit from continued skilled inpatient PT in the acute care setting in order to address deficits as defined above to maximize function and mobility.      Recommendations:    From a PT standpoint, recommend next several sessions focus on continued mobility training w/ wide rolling walker for  ambulation, LE therex for ROM/strength, eventual stair training  Anticipate if pt declines to go to rehab that he may need alternate entrance for MICHAEL (since no Hand rails), and first floor set up.      Prognosis Fair   Problem List Decreased strength;Decreased range of motion;Decreased endurance;Impaired balance;Decreased mobility;Pain;Obesity;Orthopedic restrictions;Decreased skin integrity;Decreased coordination  (gait deviations)   Barriers to Discharge (S)  Decreased caregiver support;Inaccessible home environment  (jordan as both sets of stairs to enter do NOT have railing per pt--Pt reports intermittently going up via ahnds and feet crawl in the past)   Goals   Patient Goals to get home and up to my second floor bedroom   STG Expiration Date 01/22/24   Short Term Goal #1 Goals: Pt will: Perform rolling  and supine<>sit bed mobility tasks with Supervision to prepare for transfers and reposition in bed. Perform transfers with modified I to promote proper hand placement and approach, LE placement. Perform ambulation with LRAD for as least 50' with Supervision and no more than 10% verba instruction to improve gait quality and promote proper use of assistive device. Perform at least 2 stairs w/ BUE support with at least DME and w/no more than min A to return to home with MICHAEL..   PT Treatment Day 1   Plan   Treatment/Interventions Functional transfer training;LE strengthening/ROM;Therapeutic exercise;Endurance training;Cognitive reorientation;Patient/family training;Bed mobility;Gait training;Equipment eval/education;Elevations;Spoke to nursing;Spoke to case management;OT;Spoke to MD   PT Frequency 4-6x/wk   Discharge Recommendation   Rehab Resource Intensity Level, PT II (Moderate Resource Intensity)   Equipment Recommended Walker  (wide)   Additional Comments Nursing Recommendations:    Mobility Plan as of 01/12/24: Pt is 1-2 Assist with  wide rolling walker  to/from recliner and BSC.   Additional Comments 2  Co-morbidities affecting pt's physical performance at time of assessment include but are not limited to: obesity, self reported R knee problems ~2010, HTN. Personal factors affecting pt at time of IE include: steps to enter environment, multi-level environment, behavioral pattern, inability to perform IADLs, inability to perform ADLs, inability to ambulate household distances, inability to navigate community distances, limited insight into impairments, and recent fall(s).   AM-PAC Basic Mobility Inpatient   Turning in Flat Bed Without Bedrails 3   Lying on Back to Sitting on Edge of Flat Bed Without Bedrails 2   Moving Bed to Chair 2   Standing Up From Chair Using Arms 3   Walk in Room 1   Climb 3-5 Stairs With Railing 1   Basic Mobility Inpatient Raw Score 12   Basic Mobility Standardized Score 32.23   Highest Level Of Mobility   -HLM Goal 4: Move to chair/commode   -HLM Achieved 6: Walk 10 steps or more   Additional Treatment Session   Start Time 0935   End Time 0956   Treatment Assessment Pt required less physical A for transfers, and was able to amb short distances with less verbal instruction and even teachback to PCA staff at times. However still recommend A of 2 for ambulation and likely back to bed. Skilled PT recommended to progress pt toward treatment goals.   Equipment Use Wide rolling walker   Additional Treatment Day 1   Exercises   Heelslides Supine;5 reps;AAROM;PROM;Right   Neuro re-ed Transfers min A of 1 and verbal instruction for hand placement, LE placement, forward weight shift. Amb w/ wide rolling walker for 3' w/min a of 2.  Extended time for re-education re: techniques for transfers and amb.   End of Consult   Patient Position at End of Consult All needs within reach;Bed/Chair alarm activated;Bedside chair   Pt requires PT /OT co-treat and co-eval due to required skilled interventions of at least 2 clinicians for care delivery, medical complexity, limited activity tolerance, and  "cognitive-behavioral impairments.   PT and OT goals addressed separately.   (Please find full objective findings from PT assessment regarding body systems outlined above).     The patient's -[facility] Basic Mobility Inpatient Short Form Raw Score is 12. A Raw score of less than or equal to 16 suggests the patient may benefit from discharge to post-acute rehabilitation services, which DOES coincide with CURRENT above PT recommendations.     However please refer to therapist recommendation for discharge planning given other factors that may influence destination.     Adapted from Mor GILLIAM, Robyn J, Merry J, Maggie BONILLA. [employer] [facility] “6-Clicks” Basic Mobility and Daily Activity Scores With Discharge Destination. Physical Therapy, 2021;101:1-9. DOI: [email or web address]    Portions of the record may have been created with voice recognition software.  Occasional wrong word or \"sound a like\" substitutions may have occurred due to the inherent limitations of voice recognition software.  Read the chart carefully and recognize, using context, where substitutions have occurred    "

## 2024-01-12 NOTE — PROGRESS NOTES
Progress Note - Orthopedics   Kemal Huddleston 68 y.o. male MRN: [id number]  Unit/Bed#: W -01      Subjective:    68 y.o.male seen and evaluated. He reports no pain and no issues postoperatively. Comfortable at rest and doing well overall. No CP/SOB, no paresthesias or other complaints      Labs:  0   Lab Value Date/Time    HCT 39.8 01/12/2024 0518    HCT 41.4 01/11/2024 2027    HCT 43.2 01/11/2024 0633    HGB 13.0 01/12/2024 0518    HGB 13.6 01/11/2024 2027    HGB 14.5 01/11/2024 0633    INR 0.98 01/10/2024 1347    WBC 9.37 01/12/2024 0518    WBC 8.97 01/11/2024 0633    WBC 9.62 01/10/2024 1347       Meds:    Current Facility-Administered Medications:     acetaminophen (TYLENOL) tablet 650 mg, 650 mg, Oral, Q6H MARYJO, Andrae Mary PA-C, 650 mg at 01/12/24 0513    amLODIPine (NORVASC) tablet 5 mg, 5 mg, Oral, Daily, Navya Blair MD, 5 mg at 01/12/24 0806    enoxaparin (LOVENOX) subcutaneous injection 30 mg, 30 mg, Subcutaneous, Q12H, Andrae Mary PA-C, 30 mg at 01/12/24 0048    losartan (COZAAR) tablet 50 mg, 50 mg, Oral, Daily, 50 mg at 01/12/24 0806 **AND** hydrochlorothiazide (HYDRODIURIL) tablet 12.5 mg, 12.5 mg, Oral, Daily, Navya Blair MD, 12.5 mg at 01/12/24 0806    HYDROmorphone HCl (DILAUDID) injection 0.2 mg, 0.2 mg, Intravenous, Q2H PRN, Andrae Mary PA-C    labetalol (NORMODYNE) injection 10 mg, 10 mg, Intravenous, Q15 Min PRN, Andrae Mary PA-C    lactated ringers infusion, 50 mL/hr, Intravenous, Continuous, Jimmie Guerra CRNA, Last Rate: 50 mL/hr at 01/11/24 1013, 50 mL/hr at 01/11/24 1013    naloxone (NARCAN) 0.04 mg/mL syringe 0.04 mg, 0.04 mg, Intravenous, Q1MIN PRN, Andrae Mary PA-C    ondansetron (ZOFRAN) injection 4 mg, 4 mg, Intravenous, Q4H PRN, Andrae Mary PA-C    oxyCODONE (ROXICODONE) split tablet 2.5 mg, 2.5 mg, Oral, Q4H PRN **OR** oxyCODONE (ROXICODONE) IR tablet 5 mg, 5 mg, Oral, Q4H PRN, Andrae Mary PA-C, 5 mg at 01/10/24 7005    senna-docusate sodium (SENOKOT S)  "8.6-50 mg per tablet 1 tablet, 1 tablet, Oral, HS, Andrae Mary PA-C    Blood Culture:   No results found for: \"BLOODCX\"    Wound Culture:   No results found for: \"WOUNDCULT\"    Ins and Outs:  I/O last 24 hours:  In: 1180 [P.O.:480; I.V.:700]  Out: 800 [Urine:700; Blood:100]          Physical:  Vitals:    01/12/24 0805   BP:    Pulse: 82   Resp:    Temp:    SpO2: 93%     Musculoskeletal: right Lower Extremity  Skin without significant edema or ecchymosis. Compartments soft and compressible  Dressing C/D/I  Mild lupillo-incisional tenderness   Sensation intact to saphenous, sural, tibial, superficial peroneal nerve, and deep peroneal  Motor intact to +FHL/EHL, +ankle dorsi/plantar flexion  2+ DP pulse  Digits warm and well perfused  Capillary refill < 2 seconds    Assessment:    68 y.o.male s/p right long TFN 1/11/24 .     Plan:  WBAT RLE  Hgb 13.0 today. Will continue to monitor for ABLA and administer IVF/prbc as indicated for Greater than 2 gram drop or Hgb < 7   PT/OT  Pain control - well controlled at this time   DVT ppx per primary team   Medical co-morbidities being managed per primary team  Dispo: ortho stable    Karis Sue PA-C      "

## 2024-01-12 NOTE — PLAN OF CARE
Problem: PHYSICAL THERAPY ADULT  Goal: Performs mobility at highest level of function for planned discharge setting.  See evaluation for individualized goals.  Description: Treatment/Interventions: Functional transfer training, LE strengthening/ROM, Therapeutic exercise, Endurance training, Cognitive reorientation, Patient/family training, Bed mobility, Gait training, Equipment eval/education, Elevations, Spoke to nursing, Spoke to case management, OT, Spoke to MD  Equipment Recommended: Walker (wide)       See flowsheet documentation for full assessment, interventions and recommendations.  Note: Prognosis: Fair  Problem List: Decreased strength, Decreased range of motion, Decreased endurance, Impaired balance, Decreased mobility, Pain, Obesity, Orthopedic restrictions, Decreased skin integrity, Decreased coordination (gait deviations)  Assessment: Pt is a 68 y.o. male seen for PT evaluation s/p admit to [facility] on 1/10/2024 w/s/p fall, had above surgery and is POD1, WBAT on RLE. .  Order placed for PT.      Prior to admission: Pt lived w/spouse in 2 story home w/MICHAEL, did not use DME prior to this admission. Upon evaluation: Pt needed A of 1 for bed mobility, min A for transfers, but A of 2 for amb short distances w/ wide walker. He requires >50% instruction at times for mobility, sequencing, walker management, attending to task.    Pt's clinical presentation is currently unstable/unpredictable given the functional mobility deficits above, especially (but not limited to) weakness, decreased ROM, gait deviations, and pain, and combined with medical complications including hypertension , low SpO2 values, and impulsivity during admission.  Pt IS NOT at his mobility baseline.  He is at risk for falls based on hx of falls, impaired balance, impaired coordination, impaired judgement, and obesity    During this admission, pt would benefit from continued skilled inpatient PT in the acute care setting in order  to address deficits as defined above to maximize function and mobility.      Recommendations:     From a PT standpoint, recommend next several sessions focus on continued mobility training w/ wide rolling walker for ambulation, LE therex for ROM/strength, eventual stair training   Anticipate if pt declines to go to rehab that he may need alternate entrance for MICHAEL (since no Hand rails), and first floor set up.  Barriers to Discharge: (S) Decreased caregiver support, Inaccessible home environment (jordan as both sets of stairs to enter do NOT have railing per pt--Pt reports intermittently going up via ahnds and feet crawl in the past)     Rehab Resource Intensity Level, PT: II (Moderate Resource Intensity)    See flowsheet documentation for full assessment.

## 2024-01-12 NOTE — PLAN OF CARE
Problem: Potential for Falls  Goal: Patient will remain free of falls  Description: INTERVENTIONS:  - Educate patient/family on patient safety including physical limitations  - Instruct patient to call for assistance with activity   - Consult OT/PT to assist with strengthening/mobility   - Keep Call bell within reach  - Keep bed low and locked with side rails adjusted as appropriate  - Keep care items and personal belongings within reach  - Initiate and maintain comfort rounds  - Make Fall Risk Sign visible to staff  - Offer Toileting every 2 Hours, in advance of need  - Initiate/Maintain bed/chair alarm    Problem: INFECTION - ADULT  Goal: Absence or prevention of progression during hospitalization  Description: INTERVENTIONS:  - Assess and monitor for signs and symptoms of infection  - Monitor lab/diagnostic results  - Monitor all insertion sites, i.e. indwelling lines, tubes, and drains  - Monitor endotracheal if appropriate and nasal secretions for changes in amount and color  - [place] appropriate cooling/warming therapies per order  - Administer medications as ordered  - Instruct and encourage patient and family to use good hand hygiene technique  - Identify and instruct in appropriate isolation precautions for identified infection/condition  Outcome: Progressing     Problem: PAIN - ADULT  Goal: Verbalizes/displays adequate comfort level or baseline comfort level  Description: Interventions:  - Encourage patient to monitor pain and request assistance  - Assess pain using appropriate pain scale  - Administer analgesics based on type and severity of pain and evaluate response  - Implement non-pharmacological measures as appropriate and evaluate response  - Consider cultural and social influences on pain and pain management  - Notify physician/advanced practitioner if interventions unsuccessful or patient reports new pain  Outcome: Progressing     Problem: DISCHARGE PLANNING  Goal: Discharge to home or other  facility with appropriate resources  Description: INTERVENTIONS:  - Identify barriers to discharge w/patient and caregiver  - Arrange for needed discharge resources and transportation as appropriate  - Identify discharge learning needs (meds, wound care, etc.)  - Arrange for interpretive services to assist at discharge as needed  - Refer to Case Management Department for coordinating discharge planning if the patient needs post-hospital services based on physician/advanced practitioner order or complex needs related to functional status, cognitive ability, or social support system  Outcome: Progressing   - Apply yellow socks and bracelet for high fall risk patients  - Consider moving patient to room near nurses station  Outcome: Progressing

## 2024-01-12 NOTE — ASSESSMENT & PLAN NOTE
Home medications include amlodipine 5 mg daily, olmesartan/HCTZ 20-12.5 mg daily.  HTN meds initially held on admission, re-started post-operatively on 1/11/24  Pharmacy does not carry Olmesartan-HCTZ; alternative = Cozaar 50mg-HCTZ 12.5 mg while admitted

## 2024-01-12 NOTE — CASE MANAGEMENT
Case Management Assessment & Discharge Planning Note    Patient name Kemal Huddleston  Location W /W -01 MRN [id number]  : 1955 Date 2024       Current Admission Date: 1/10/2024  Current Admission Diagnosis:Fall   Patient Active Problem List    Diagnosis Date Noted    Fall 01/10/2024    Nondisplaced intertrochanteric fracture of right femur, initial encounter for closed fracture (HCC) 01/10/2024    Diverticulosis 2023    Annual physical exam 2021    Prostate cancer screening 07/10/2020    HTN (hypertension) 07/10/2020    Class 2 severe obesity due to excess calories with serious comorbidity and body mass index (BMI) of 39.0 to 39.9 in adult  07/10/2020      LOS (days): 2  Geometric Mean LOS (GMLOS) (days): 2.8  Days to GMLOS:0.7     OBJECTIVE:    Risk of Unplanned Readmission Score: 7.02         Current admission status: Inpatient       Preferred Pharmacy:   [facility] - [place] [address] [address]  Phone: [phone] Fax: [phone]    [facility] - [facility] [address]  [address]  [facility] [address]  [facility] [address]  Phone: [phone] Fax: [phone]    Primary Care Provider: Heike Nelson MD    Primary Insurance: AARP MC REP  Secondary Insurance:     ASSESSMENT:  Active Health Care Proxies    There are no active Health Care Proxies on file.    Readmission Root Cause  30 Day Readmission: No    Patient Information  Admitted from:: Home  Mental Status: Alert  During Assessment patient was accompanied by: Not accompanied during assessment  Assessment information provided by:: Patient  Primary Caregiver: Self  Support Systems: Spouse/significant other, Family members  County of Residence: [place]  What city do you live in?: [place]  Home entry access options. Select all that apply.: Stairs  Number of steps to enter home.: 3  Type of  Current Residence: 2 story home  Upon entering residence, is there a bedroom on the main floor (no further steps)?: Yes (can sleep on couch)  Upon entering residence, is there a bathroom on the main floor (no further steps)?: Yes (1/2 bath)  Living Arrangements: Lives w/ Spouse/significant other    Activities of Daily Living Prior to Admission  Functional Status: Independent  Completes ADLs independently?: Yes  Ambulates independently?: Yes  Does patient use assisted devices?: No  Does patient currently own DME?: No  Does patient have a history of Outpatient Therapy (PT/OT)?: No  Does the patient have a history of Short-Term Rehab?: No  Does patient have a history of HHC?: No  Does patient currently have HHC?: No    Patient Information Continued  Income Source: Pension/alf  Does patient have prescription coverage?: Yes  Does patient receive dialysis treatments?: No  Does patient have a history of substance abuse?: No    Means of Transportation  Means of Transport to Appts:: Drives Self    Housing Stability: Low Risk  (1/12/2024)    Housing Stability Vital Sign     Unable to Pay for Housing in the Last Year: No     Number of Places Lived in the Last Year: 1     Unstable Housing in the Last Year: No   Food Insecurity: No Food Insecurity (1/12/2024)    Hunger Vital Sign     Worried About Running Out of Food in the Last Year: Never true     Ran Out of Food in the Last Year: Never true   Transportation Needs: No Transportation Needs (1/12/2024)    PRAPARE - Transportation     Lack of Transportation (Medical): No     Lack of Transportation (Non-Medical): No   Utilities: Not At Risk (1/12/2024)    [facility] Utilities     Threatened with loss of utilities: No     DISCHARGE DETAILS:    Discharge planning discussed with:: patient at bedside, wife Tamra over phone  Freedom of Choice: Yes  Comments - [place] of Choice: HHC vs STR  CM contacted family/caregiver?: Yes  Were Treatment Team discharge recommendations reviewed with  patient/caregiver?: Yes  Did patient/caregiver verbalize understanding of patient care needs?: Yes  Were patient/caregiver advised of the risks associated with not following Treatment Team discharge recommendations?: Yes    Contacts  Patient Contacts: Tamra  Relationship to Patient:: Family  Contact Method: Phone  Phone Number: [phone]  Reason/Outcome: Continuity of Care, Emergency Contact, Referral, Discharge Planning    Other Referral/Resources/Interventions Provided:  Interventions: HHC, Short Term Rehab (vs)  Referral Comments: Patient admitted to [facility] s/p fall. CM met with patient at bedside to complete assessment/ discuss dcp, patient called wife Tamra on his cell phone to discuss as well. Patient lives with wife in a 2 story home, 3STE- can reside on first floor if needed. Patient is independent at baseline with ADLs and ambulation. Patient stating he would be agreeable to either HHC or STR- pending how he does with therapy again tomorrow. Wife confirmed she will assist patient at home if needed.     Would you like to participate in our [facility] service program?  : No - Declined

## 2024-01-12 NOTE — PLAN OF CARE
Problem: OCCUPATIONAL THERAPY ADULT  Goal: Performs self-care activities at highest level of function for planned discharge setting.  See evaluation for individualized goals.  Description: Treatment Interventions: ADL retraining, Functional transfer training, Endurance training, Patient/family training, Equipment evaluation/education, Compensatory technique education, Continued evaluation, Activityengagement, Energy conservation  Equipment Recommended: Bedside commode       See flowsheet documentation for full assessment, interventions and recommendations.   Note: Limitation: Decreased ADL status, Decreased Safe judgement during ADL, Decreased endurance, Decreased self-care trans, Decreased high-level ADLs (pain, balance, fxnl mobility, act malvin, fxnl reach, standing malvin, and strength, attention to task, safety awareness, insight, and pacing, initiation/termination of conversation)  Prognosis: Good  Assessment: Pt is a 68 y.o. male seen for OT evaluation s/p admit to [facility] on 1/10/2024 s/p fall at home resulting in R hip fx. POD # 1 IM Nail RLE. Prior to admission, pt was living with spouse in a 2 level house, (I) with ADLs, (I) with IADLs, (+) falls, (+) . Personal and environmental factors affecting patient at time of evaluation include limited social support, inaccessible home environment, difficulty completing ADLs, and difficulty completing IADLs. Personal factors supporting patient at time of evaluation include age, (I) PLOF, supportive spouse, and attitude towards recovery. Based upon this evaluation, pt is functioning below baseline. Pt will benefit from continued skilled inpatient OT to maximize safety, level of independence overall performance in ADLs, functional transfers, functional mobility to return to functional baseline/highest level of function.     Rehab Resource Intensity Level, OT: II (Moderate Resource Intensity) (pt may progress to level III pending progress towards goals,  FFSU and level of social support available at home)     YANIRA Pratt, OTR/L  [place] License [id number]  [place] License [id number]

## 2024-01-12 NOTE — PROGRESS NOTES
[facility]  Progress Note  Name: Kemal Huddleston I  MRN: [id number]  Unit/Bed#: W -01 I Date of Admission: 1/10/2024   Date of Service: 1/12/2024 I Hospital Day: 2    Assessment/Plan   Nondisplaced intertrochanteric fracture of right femur, initial encounter for closed fracture (HCC)  Assessment & Plan  1/10/24: Mechanical trip and fall while walking, nondisplaced right intertrochanteric femur fracture present on admission.  TXA given in ED  Ortho consult - s/p OR, 1/11/2024 for intramedullary nailing  Needs to work with PT/OT prior to dispo  Nonweightbearing right lower extremity  Neurovascular checks every 4 hours  Multimodal analgesia  Bowel regimen  DVT prophylaxis: Lovenox, SCDs  Diet: Regular house     Fall  Assessment & Plan  1/10/24: Mechanical trip and fall while walking with below noted injuries  Ortho consult  PT/OT  Case management for dispo planning    HTN (hypertension)  Assessment & Plan  Home medications include amlodipine 5 mg daily, olmesartan/HCTZ 20-12.5 mg daily.  HTN meds initially held on admission, re-started post-operatively on 1/11/24  Pharmacy does not carry Olmesartan-HCTZ; alternative = Cozaar 50mg-HCTZ 12.5 mg while admitted             Bowel Regimen: Senna  VTE Prophylaxis:Sequential compression device (Venodyne)  and Enoxaparin (Lovenox)     Disposition: remain admitted pending PT/OT eval and recommendations for dispo. Normal Hb post-operatively. Had BM today.    Subjective   Chief Complaint: no complaints    Subjective: feels well, pain controlled.     Objective   Vitals:   Temp:  [97.8 °F (36.6 °C)-98.4 °F (36.9 °C)] 97.8 °F (36.6 °C)  HR:  [70-92] 82  Resp:  [14-20] 14  BP: (110-133)/(68-82) 125/68    I/O         01/10 0701  01/11 0700 01/11 0701  01/12 0700 01/12 0701  01/13 0700    P.O.  480     I.V. (mL/kg)  700 (5.6)     IV Piggyback 1100      Total Intake(mL/kg) 1100 (8.9) 1180 (9.5)     Urine (mL/kg/hr) 800 700 (0.2)     Blood  100     Total  Output 800 800     Net +300 +380                     Physical Exam:   GENERAL APPEARANCE: well-appearing, laying in bed, PT at bedside  NEURO: GCS 15, awake, alert  HEENT: autramatic   CV: RRR  LUNGS: normal RR and effort, clear bilaterally  GI: nontender, nondistended  : voiding on own  MSK: moving all extremities on own. RLE: surgical site is c/d/I, soft compartments, neurovascularly intact  SKIN: no rashes    Invasive Devices       Peripheral Intravenous Line  Duration             Peripheral IV 01/10/24 Right Antecubital 1 day    Peripheral IV 01/11/24 Dorsal (posterior);Left Hand 1 day                          Lab Results: BMP/CMP:   Lab Results   Component Value Date    SODIUM 136 01/12/2024    K 3.8 01/12/2024     01/12/2024    CO2 28 01/12/2024    BUN 14 01/12/2024    CREATININE 0.75 01/12/2024    CALCIUM 8.5 01/12/2024    EGFR 93 01/12/2024    and CBC:   Lab Results   Component Value Date    WBC 9.37 01/12/2024    HGB 13.0 01/12/2024    HCT 39.8 01/12/2024    MCV 92 01/12/2024     01/12/2024    RBC 4.32 01/12/2024    MCH 30.1 01/12/2024    MCHC 32.7 01/12/2024    RDW 13.0 01/12/2024    MPV 9.3 01/12/2024    NRBC 0 01/12/2024     Imaging: I have personally reviewed pertinent reports.     Other Studies:

## 2024-01-13 LAB
ANION GAP SERPL CALCULATED.3IONS-SCNC: 3 MMOL/L
BASOPHILS # BLD AUTO: 0.02 THOUSANDS/ÂΜL (ref 0–0.1)
BASOPHILS NFR BLD AUTO: 0 % (ref 0–1)
BUN SERPL-MCNC: 15 MG/DL (ref 5–25)
CALCIUM SERPL-MCNC: 8.6 MG/DL (ref 8.4–10.2)
CHLORIDE SERPL-SCNC: 105 MMOL/L (ref 96–108)
CO2 SERPL-SCNC: 30 MMOL/L (ref 21–32)
CREAT SERPL-MCNC: 0.72 MG/DL (ref 0.6–1.3)
EOSINOPHIL # BLD AUTO: 0.21 THOUSAND/ÂΜL (ref 0–0.61)
EOSINOPHIL NFR BLD AUTO: 3 % (ref 0–6)
ERYTHROCYTE [DISTWIDTH] IN BLOOD BY AUTOMATED COUNT: 13.1 % (ref 11.6–15.1)
GFR SERPL CREATININE-BSD FRML MDRD: 95 ML/MIN/1.73SQ M
GLUCOSE SERPL-MCNC: 106 MG/DL (ref 65–140)
HCT VFR BLD AUTO: 38.1 % (ref 36.5–49.3)
HGB BLD-MCNC: 12.6 G/DL (ref 12–17)
IMM GRANULOCYTES # BLD AUTO: 0.04 THOUSAND/UL (ref 0–0.2)
IMM GRANULOCYTES NFR BLD AUTO: 1 % (ref 0–2)
LYMPHOCYTES # BLD AUTO: 1.2 THOUSANDS/ÂΜL (ref 0.6–4.47)
LYMPHOCYTES NFR BLD AUTO: 15 % (ref 14–44)
MCH RBC QN AUTO: 30.4 PG (ref 26.8–34.3)
MCHC RBC AUTO-ENTMCNC: 33.1 G/DL (ref 31.4–37.4)
MCV RBC AUTO: 92 FL (ref 82–98)
MONOCYTES # BLD AUTO: 0.94 THOUSAND/ÂΜL (ref 0.17–1.22)
MONOCYTES NFR BLD AUTO: 12 % (ref 4–12)
NEUTROPHILS # BLD AUTO: 5.61 THOUSANDS/ÂΜL (ref 1.85–7.62)
NEUTS SEG NFR BLD AUTO: 69 % (ref 43–75)
NRBC BLD AUTO-RTO: 0 /100 WBCS
PLATELET # BLD AUTO: 204 THOUSANDS/UL (ref 149–390)
PMV BLD AUTO: 9.1 FL (ref 8.9–12.7)
POTASSIUM SERPL-SCNC: 3.9 MMOL/L (ref 3.5–5.3)
RBC # BLD AUTO: 4.14 MILLION/UL (ref 3.88–5.62)
SODIUM SERPL-SCNC: 138 MMOL/L (ref 135–147)
WBC # BLD AUTO: 8.02 THOUSAND/UL (ref 4.31–10.16)

## 2024-01-13 PROCEDURE — 97530 THERAPEUTIC ACTIVITIES: CPT

## 2024-01-13 PROCEDURE — 85025 COMPLETE CBC W/AUTO DIFF WBC: CPT

## 2024-01-13 PROCEDURE — 99024 POSTOP FOLLOW-UP VISIT: CPT

## 2024-01-13 PROCEDURE — 97116 GAIT TRAINING THERAPY: CPT

## 2024-01-13 PROCEDURE — 99232 SBSQ HOSP IP/OBS MODERATE 35: CPT | Performed by: SURGERY

## 2024-01-13 PROCEDURE — 97110 THERAPEUTIC EXERCISES: CPT

## 2024-01-13 PROCEDURE — 80048 BASIC METABOLIC PNL TOTAL CA: CPT

## 2024-01-13 RX ADMIN — ENOXAPARIN SODIUM 30 MG: 30 INJECTION SUBCUTANEOUS at 00:45

## 2024-01-13 RX ADMIN — ACETAMINOPHEN 650 MG: 325 TABLET, FILM COATED ORAL at 12:37

## 2024-01-13 RX ADMIN — ACETAMINOPHEN 650 MG: 325 TABLET, FILM COATED ORAL at 00:45

## 2024-01-13 RX ADMIN — AMLODIPINE BESYLATE 5 MG: 5 TABLET ORAL at 08:07

## 2024-01-13 RX ADMIN — ENOXAPARIN SODIUM 30 MG: 30 INJECTION SUBCUTANEOUS at 14:38

## 2024-01-13 RX ADMIN — HYDROCHLOROTHIAZIDE 12.5 MG: 12.5 TABLET ORAL at 08:07

## 2024-01-13 RX ADMIN — LOSARTAN POTASSIUM 50 MG: 50 TABLET, FILM COATED ORAL at 08:07

## 2024-01-13 RX ADMIN — ACETAMINOPHEN 650 MG: 325 TABLET, FILM COATED ORAL at 05:39

## 2024-01-13 NOTE — PROGRESS NOTES
"Critical access hospital  Progress Note  Name: Kemal Huddleston I  MRN: [id number]  Unit/Bed#: W -01 I Date of Admission: 1/10/2024   Date of Service: 1/13/2024 I Hospital Day: 3    Assessment/Plan   Fall  Assessment & Plan  - Status post mechanical fall with the below noted injuries.  - Fall precautions.  - PT and OT evaluation and treatment as indicated.  - Case Management consultation for disposition planning.    * Nondisplaced intertrochanteric fracture of right femur, initial encounter for closed fracture (HCC)  Assessment & Plan  - Nondisplaced right intertrochanteric femur fracture, present on admission.  - Appreciate Orthopedic surgery evaluation, recommendations and interventions as noted.   - TXA administered in the ED.   - Patient status post ORIF of right femur fracture with IM nail insertion on 1/11/2024.  - May be weightbearing as tolerated on the right lower extremity postoperatively.  - Monitor right lower extremity neurovascular exam.  - Continue multimodal analgesic regimen.  - Continue DVT prophylaxis.  - PT and OT evaluation and treatment as indicated.  - Outpatient follow up with Orthopedic surgery for re-evaluation.    HTN (hypertension)  Assessment & Plan  - Patient with chronic history of hypertension.  - Continue current medication regimen.  - Patient with continued use of diuretic and ARB therapy despite recent operative intervention with no evidence of JENIFFER at this time.  - Resume home medication regimen on discharge as appropriate.  - Outpatient follow-up routine.               Bowel Regimen: On Senokot-S.  VTE Prophylaxis:Sequential compression device (Venodyne)  and Enoxaparin (Lovenox)     Disposition: Continue therapy evaluation and treatment as indicated with anticipated discharge home versus rehab pending continued progress.  Case management following for disposition planning.    Subjective   Chief Complaint: \"I'm doing okay.\"    Subjective: Patient notes he is " overall doing okay and reports that his mobility is improving.  He still has pain and soreness in the right hip and thigh as well as pain in his knee which is chronic from arthritis.  He has no other new complaints.  He denies any shortness of breath or difficulty breathing.  He is tolerating oral intake without nausea, vomiting or constipation.     Objective   Vitals:   Temp:  [97.9 °F (36.6 °C)-98.2 °F (36.8 °C)] 98 °F (36.7 °C)  HR:  [76-88] 76  Resp:  [15-17] 15  BP: (118-142)/(74-81) 118/76    I/O         01/11 0701  01/12 0700 01/12 0701  01/13 0700 01/13 0701  01/14 0700    P.O. 480 840     I.V. (mL/kg) 700 (5.6) 1347.5 (10.4)     IV Piggyback       Total Intake(mL/kg) 1180 (9.5) 2187.5 (16.8)     Urine (mL/kg/hr) 700 (0.2) 2350 (0.8)     Stool  0     Blood 100      Total Output 800 2350     Net +380 -162.5            Unmeasured Stool Occurrence  1 x              Physical Exam:   GENERAL APPEARANCE: Patient in no acute distress.  HEENT: NCAT; EOMs intact; Mucous membranes moist  CV: Regular rate and rhythm; no murmur/gallops/rubs appreciated.  CHEST / LUNGS: Clear to auscultation; no wheezes/rales/rhonci.  ABD: NABS; soft; non-distended; non-tender.  : Voiding spontaneously.  EXT: +2 pulses bilaterally upper & lower extremities.  Mild swelling and some continued tenderness throughout the right hip and thigh with clean/dry/intact postoperative dressings, soft and compressible muscle compartments with intact neurovascular exam throughout the right lower extremity.  Patient did have some swelling and tenderness around his right knee as well which she reports is chronic.  NEURO: GCS 15; no focal neurologic deficits; neurovascularly intact.  SKIN: Warm, dry and well perfused; no rash; no jaundice.    Invasive Devices       Peripheral Intravenous Line  Duration             Peripheral IV 01/10/24 Right Antecubital 3 days    Peripheral IV 01/11/24 Dorsal (posterior);Left Hand 2 days                          Lab  Results: Results: I have personally reviewed all pertinent laboratory/tests results  Imaging: I have personally reviewed pertinent reports.     Other Studies: N/A     Imer Rausch PA-C  1/13/2024  07:17 AM

## 2024-01-13 NOTE — PHYSICAL THERAPY NOTE
PHYSICAL THERAPY NOTE          Patient Name: Kemal Huddleston  Today's Date: 1/13/2024 01/13/24 1329   PT Last Visit   PT Visit Date 01/13/24   Note Type   Note Type Treatment   Pain Assessment   Pain Assessment Tool 0-10   Pain Score 2  (more soreness then pain)   Pain Location/Orientation Orientation: Right;Location: Hip  (R lateral leg from hip to knee)   Pain Onset/Description Onset: Ongoing   Patient's Stated Pain Goal No pain   Hospital Pain Intervention(s) Repositioned;Ambulation/increased activity;Rest   Multiple Pain Sites No   Restrictions/Precautions   Weight Bearing Precautions Per Order Yes   RLE Weight Bearing Per Order WBAT   Other Precautions Chair Alarm;Bed Alarm;WBS;Fall Risk;Pain   General   Chart Reviewed Yes   Additional Pertinent History pt reports soreness of R hip but no pain   Response to Previous Treatment Patient with no complaints from previous session.   Family/Caregiver Present Yes   Cognition   Overall Cognitive Status WFL   Arousal/Participation Alert;Responsive;Cooperative   Attention Attends with cues to redirect   Orientation Level Oriented X4   Memory Within functional limits   Following Commands Follows one step commands with increased time or repetition   Comments pt was pleasant, cooperative and motivated throughout PT intervention   Subjective   Subjective pt was agreeable to participate in PT intervention. pt stated no real pain but soreness of R hip   Bed Mobility   Rolling R Unable to assess   Rolling L Unable to assess   Supine to Sit Unable to assess   Sit to Supine Unable to assess   Additional Comments pt was seated OOB in the recliner pre/post tx session   Transfers   Sit to Stand 5  Supervision   Additional items Assist x 1;Armrests;Increased time required;Verbal cues  (to RW with + time)   Stand to Sit 5  Supervision   Additional items Assist x 1;Armrests;Increased time  required;Verbal cues  (from RW w/ + time, Vc's for hand placement while descending into recliner)   Stand pivot 5  Supervision   Additional items Assist x 1;Armrests;Increased time required;Verbal cues  (w/ RW)   Additional Comments pt continues to require RW for all funcitonal transfers but pt required a decrease in level of assistanec required compared top previous tx sessions, /s   Ambulation/Elevation   Gait pattern Decreased R stance;Antalgic;Improper Weight shift;Excessively slow;Decreased hip extension;Decreased heel strike;Decreased toe off   Gait Assistance 4  Minimal assist   Additional items Assist x 1;Verbal cues;Tactile cues   Assistive Device Rolling walker  (wide)   Distance 30'x1 RW   Stair Management Assistance 4  Minimal assist   Additional items Assist x 1;Verbal cues;Increased time required   Stair Management Technique Two rails;Step to pattern;Foreward;Backward   Number of Stairs 3  (initial steps required min ax1 but last two steps pt completed with CGA)   Balance   Static Sitting Good   Dynamic Sitting Fair +   Static Standing Fair -   Dynamic Standing Poor +   Ambulatory Poor +   Endurance Deficit   Endurance Deficit Yes   Endurance Deficit Description limited activity tolerance and ambulation distance. pt continues to require standing rest breaks while ambulating in todays tx session x2   Activity Tolerance   Activity Tolerance Patient limited by fatigue   Nurse Made Aware Spoke to RN   Exercises   Quad Sets Sitting;10 reps;AROM;Right   Hip Flexion Sitting;10 reps;AAROM;Right   Hip Abduction Sitting;10 reps;AROM;Bilateral   Hip Adduction Sitting;10 reps;AROM;Bilateral  (pillow squeezes to neutral)   Knee AROM Long Arc Quad Sitting;10 reps;AROM;Right   Marching Sitting;10 reps;AAROM;Right   Assessment   Prognosis Fair   Problem List Decreased strength;Decreased range of motion;Decreased endurance;Impaired balance;Decreased mobility;Obesity;Decreased skin integrity;Orthopedic  restrictions;Pain  (gait deviations)   Assessment pt began tx session seated OOB in the recliner and was agreeable to participate in PT intervention. pt began tx session 2/10 R hip pain/soreness pt was then instructed in R hip exercises in order to increase RLE strength/ROM for improved balance and overall pain reductions. pt reports no increase in pain in [facility] tx session. Progress was noted this tx session as pt was qable to complete multiple functional transfers to and from Rw with a decrease in level of assistance required compared to previous tx sessions, /s w/ + time to complete transfers. pt cotninues to demonstrate the inability to ambulate house hold distances as pt was limited to 30'x1 RW min Ax1. pt did take 2 standing therapeutic rest breaks due to fatige. pt w/ improper weight shifts with ambulation in [facility] tx session. pt was educated on all safe stair trial strategies prior to initiating steps. pt utilized bilateral hand rails and was able to complete 3 steps in [facility] tx session. pt did require min ax1 for initial step but /s, for last two steps. Post tx pt continues to demonstrate the following deficits: limited activity tolerance, ambulation distance/safety and balance. pt would benefit from continued skilled PT intervention in order to address defiicts listed above. Continue to recommend Dc w/ level 2 moderate rehab resource intensity when medically cleared.   Goals   Patient Goals to go home   STG Expiration Date 01/22/24   PT Treatment Day 2   Plan   Treatment/Interventions Functional transfer training;LE strengthening/ROM;Therapeutic exercise;Elevations;Endurance training;Patient/family training;Equipment eval/education;Bed mobility;Gait training;Spoke to nursing   Progress Slow progress, decreased activity tolerance   PT Frequency 4-6x/wk   Discharge Recommendation   Rehab Resource Intensity Level, PT II (Moderate Resource Intensity)   Equipment Recommended Walker   AM-PAC Basic Mobility  Inpatient   Turning in Flat Bed Without Bedrails 3   Lying on Back to Sitting on Edge of Flat Bed Without Bedrails 2   Moving Bed to Chair 3   Standing Up From Chair Using Arms 3   Walk in Room 3   Climb 3-5 Stairs With Railing 3   Basic Mobility Inpatient Raw Score 17   Basic Mobility Standardized Score 39.67   Highest Level Of Mobility   -[facility] Goal 5: Stand one or more mins   JH-HLM Achieved 7: Walk 25 feet or more   Education   Education Provided Mobility training;Assistive device;Other  (stair trials, TE activities)   Patient Demonstrates acceptance/verbal understanding   End of Consult   Patient Position at End of Consult Bedside chair;Bed/Chair alarm activated;All needs within reach   The patient's AM-PAC Basic Mobility Inpatient Short Form Raw Score is 17. A Raw score of greater than 16 suggests the patient may benefit from discharge to home. Please also refer to the recommendation of the Physical Therapist for safe discharge planning.    Pt would benefit from continued skilled PT intervention as pt remains limited with activity toleranec and ambulation balance/tolerance and distance     Kartik Morocho

## 2024-01-13 NOTE — ASSESSMENT & PLAN NOTE
- Patient with chronic history of hypertension.  - Continue current medication regimen.  - Patient with continued use of diuretic and ARB therapy despite recent operative intervention with no evidence of JENIFFER at this time.  - Resume home medication regimen on discharge as appropriate.  - Outpatient follow-up routine.

## 2024-01-13 NOTE — ASSESSMENT & PLAN NOTE
- Status post mechanical fall with the below noted injuries.  - Fall precautions.  - PT and OT evaluation and treatment as indicated.  - Case Management consultation for disposition planning.

## 2024-01-13 NOTE — PLAN OF CARE
Problem: PHYSICAL THERAPY ADULT  Goal: Performs mobility at highest level of function for planned discharge setting.  See evaluation for individualized goals.  Description: Treatment/Interventions: Functional transfer training, LE strengthening/ROM, Therapeutic exercise, Endurance training, Cognitive reorientation, Patient/family training, Bed mobility, Gait training, Equipment eval/education, Elevations, Spoke to nursing, Spoke to case management, OT, Spoke to MD  Equipment Recommended: Walker (wide)       See flowsheet documentation for full assessment, interventions and recommendations.  Outcome: Progressing  Note: Prognosis: Fair  Problem List: Decreased strength, Decreased range of motion, Decreased endurance, Impaired balance, Decreased mobility, Obesity, Decreased skin integrity, Orthopedic restrictions, Pain (gait deviations)  Assessment: pt began tx session seated OOB in the recliner and was agreeable to participate in PT intervention. pt began tx session 2/10 R hip pain/soreness pt was then instructed in R hip exercises in order to increase RLE strength/ROM for improved balance and overall pain reductions. pt reports no increase in pain in [facility] tx session. Progress was noted this tx session as pt was qable to complete multiple functional transfers to and from Rw with a decrease in level of assistance required compared to previous tx sessions, /s w/ + time to complete transfers. pt cotninues to demonstrate the inability to ambulate house hold distances as pt was limited to 30'x1 RW min Ax1. pt did take 2 standing therapeutic rest breaks due to fatige. pt w/ improper weight shifts with ambulation in [facility] tx session. pt was educated on all safe stair trial strategies prior to initiating steps. pt utilized bilateral hand rails and was able to complete 3 steps in [facility] tx session. pt did require min ax1 for initial step but /s, for last two steps. Post tx pt continues to demonstrate the following  deficits: limited activity tolerance, ambulation distance/safety and balance. pt would benefit from continued skilled PT intervention in order to address defiicts listed above. Continue to recommend Dc w/ level 2 moderate rehab resource intensity when medically cleared.  Barriers to Discharge: (S) Decreased caregiver support, Inaccessible home environment (jordan as both sets of stairs to enter do NOT have railing per pt--Pt reports intermittently going up via ahnds and feet crawl in the past)     Rehab Resource Intensity Level, PT: II (Moderate Resource Intensity)    See flowsheet documentation for full assessment.

## 2024-01-13 NOTE — PLAN OF CARE
Problem: Potential for Falls  Goal: Patient will remain free of falls  Description: INTERVENTIONS:  - Educate patient/family on patient safety including physical limitations  - Instruct patient to call for assistance with activity   - Consult OT/PT to assist with strengthening/mobility   - Keep Call bell within reach  - Keep bed low and locked with side rails adjusted as appropriate  - Keep care items and personal belongings within reach  - Initiate and maintain comfort rounds  - Make Fall Risk Sign visible to staff  - Offer Toileting every  Hours, in advance of need  - Initiate/Maintain alarm  - Obtain necessary fall risk management equipment:   - Apply yellow socks and bracelet for high fall risk patients  - Consider moving patient to room near nurses station  Outcome: Progressing     Problem: PAIN - ADULT  Goal: Verbalizes/displays adequate comfort level or baseline comfort level  Description: Interventions:  - Encourage patient to monitor pain and request assistance  - Assess pain using appropriate pain scale  - Administer analgesics based on type and severity of pain and evaluate response  - Implement non-pharmacological measures as appropriate and evaluate response  - Consider cultural and social influences on pain and pain management  - Notify physician/advanced practitioner if interventions unsuccessful or patient reports new pain  Outcome: Progressing     Problem: INFECTION - ADULT  Goal: Absence or prevention of progression during hospitalization  Description: INTERVENTIONS:  - Assess and monitor for signs and symptoms of infection  - Monitor lab/diagnostic results  - Monitor all insertion sites, i.e. indwelling lines, tubes, and drains  - Monitor endotracheal if appropriate and nasal secretions for changes in amount and color  - [place] appropriate cooling/warming therapies per order  - Administer medications as ordered  - Instruct and encourage patient and family to use good hand hygiene technique  -  Identify and instruct in appropriate isolation precautions for identified infection/condition  Outcome: Progressing  Goal: Absence of fever/infection during neutropenic period  Description: INTERVENTIONS:  - Monitor WBC    Outcome: Progressing     Problem: SAFETY ADULT  Goal: Patient will remain free of falls  Description: INTERVENTIONS:  - Educate patient/family on patient safety including physical limitations  - Instruct patient to call for assistance with activity   - Consult OT/PT to assist with strengthening/mobility   - Keep Call bell within reach  - Keep bed low and locked with side rails adjusted as appropriate  - Keep care items and personal belongings within reach  - Initiate and maintain comfort rounds  - Make Fall Risk Sign visible to staff  - Offer Toileting every  Hours, in advance of need  - Initiate/Maintain alarm  - Obtain necessary fall risk management equipment:   - Apply yellow socks and bracelet for high fall risk patients  - Consider moving patient to room near nurses station  Outcome: Progressing  Goal: Maintain or return to baseline ADL function  Description: INTERVENTIONS:  -  Assess patient's ability to carry out ADLs; assess patient's baseline for ADL function and identify physical deficits which impact ability to perform ADLs (bathing, care of mouth/teeth, toileting, grooming, dressing, etc.)  - Assess/evaluate cause of self-care deficits   - Assess range of motion  - Assess patient's mobility; develop plan if impaired  - Assess patient's need for assistive devices and provide as appropriate  - Encourage maximum independence but intervene and supervise when necessary  - Involve family in performance of ADLs  - Assess for home care needs following discharge   - Consider OT consult to assist with ADL evaluation and planning for discharge  - Provide patient education as appropriate  Outcome: Progressing  Goal: Maintains/Returns to pre admission functional level  Description:  INTERVENTIONS:  - Perform AM-PAC 6 Click Basic Mobility/ Daily Activity assessment daily.  - Set and communicate daily mobility goal to care team and patient/family/caregiver.   - Collaborate with rehabilitation services on mobility goals if consulted  - Perform Range of Motion  times a day.  - Reposition patient every  hours.  - Dangle patient  times a day  - Stand patient  times a day  - Ambulate patient  times a day  - Out of bed to chair  times a day   - Out of bed for meals times a day  - Out of bed for toileting  - Record patient progress and toleration of activity level   Outcome: Progressing     Problem: DISCHARGE PLANNING  Goal: Discharge to home or other facility with appropriate resources  Description: INTERVENTIONS:  - Identify barriers to discharge w/patient and caregiver  - Arrange for needed discharge resources and transportation as appropriate  - Identify discharge learning needs (meds, wound care, etc.)  - Arrange for interpretive services to assist at discharge as needed  - Refer to Case Management Department for coordinating discharge planning if the patient needs post-hospital services based on physician/advanced practitioner order or complex needs related to functional status, cognitive ability, or social support system  Outcome: Progressing     Problem: Knowledge Deficit  Goal: Patient/family/caregiver demonstrates understanding of disease process, treatment plan, medications, and discharge instructions  Description: Complete learning assessment and assess knowledge base.  Interventions:  - Provide teaching at level of understanding  - Provide teaching via preferred learning methods  Outcome: Progressing     Problem: Nutrition/Hydration-ADULT  Goal: Nutrient/Hydration intake appropriate for improving, restoring or maintaining nutritional needs  Description: Monitor and assess patient's nutrition/hydration status for malnutrition. Collaborate with interdisciplinary team and initiate plan and  interventions as ordered.  Monitor patient's weight and dietary intake as ordered or per policy. Utilize nutrition screening tool and intervene as necessary. Determine patient's food preferences and provide high-protein, high-caloric foods as appropriate.     INTERVENTIONS:  - Monitor oral intake, urinary output, labs, and treatment plans  - Assess nutrition and hydration status and recommend course of action  - Evaluate amount of meals eaten  - Assist patient with eating if necessary   - Allow adequate time for meals  - Recommend/ encourage appropriate diets, oral nutritional supplements, and vitamin/mineral supplements  - Order, calculate, and assess calorie counts as needed  - Recommend, monitor, and adjust tube feedings and TPN/PPN based on assessed needs  - Assess need for intravenous fluids  - Provide specific nutrition/hydration education as appropriate  - Include patient/family/caregiver in decisions related to nutrition  Outcome: Progressing

## 2024-01-13 NOTE — PLAN OF CARE
Problem: Potential for Falls  Goal: Patient will remain free of falls  Description: INTERVENTIONS:  - Educate patient/family on patient safety including physical limitations  - Instruct patient to call for assistance with activity   - Consult OT/PT to assist with strengthening/mobility   - Keep Call bell within reach  - Keep bed low and locked with side rails adjusted as appropriate  - Keep care items and personal belongings within reach  - Initiate and maintain comfort rounds  - Make Fall Risk Sign visible to staff  - Apply yellow socks and bracelet for high fall risk patients  - Consider moving patient to room near nurses station  Outcome: Progressing     Problem: PAIN - ADULT  Goal: Verbalizes/displays adequate comfort level or baseline comfort level  Description: Interventions:  - Encourage patient to monitor pain and request assistance  - Assess pain using appropriate pain scale  - Administer analgesics based on type and severity of pain and evaluate response  - Implement non-pharmacological measures as appropriate and evaluate response  - Consider cultural and social influences on pain and pain management  - Notify physician/advanced practitioner if interventions unsuccessful or patient reports new pain  Outcome: Progressing     Problem: INFECTION - ADULT  Goal: Absence or prevention of progression during hospitalization  Description: INTERVENTIONS:  - Assess and monitor for signs and symptoms of infection  - Monitor lab/diagnostic results  - Monitor all insertion sites, i.e. indwelling lines, tubes, and drains  - Monitor endotracheal if appropriate and nasal secretions for changes in amount and color  - [place] appropriate cooling/warming therapies per order  - Administer medications as ordered  - Instruct and encourage patient and family to use good hand hygiene technique  - Identify and instruct in appropriate isolation precautions for identified infection/condition  Outcome: Progressing  Goal: Absence  of fever/infection during neutropenic period  Description: INTERVENTIONS:  - Monitor WBC    Outcome: Progressing     Problem: SAFETY ADULT  Goal: Patient will remain free of falls  Description: INTERVENTIONS:  - Educate patient/family on patient safety including physical limitations  - Instruct patient to call for assistance with activity   - Consult OT/PT to assist with strengthening/mobility   - Keep Call bell within reach  - Keep bed low and locked with side rails adjusted as appropriate  - Keep care items and personal belongings within reach  - Initiate and maintain comfort rounds  - Make Fall Risk Sign visible to staff  - Apply yellow socks and bracelet for high fall risk patients  - Consider moving patient to room near nurses station  Outcome: Progressing  Goal: Maintain or return to baseline ADL function  Description: INTERVENTIONS:  -  Assess patient's ability to carry out ADLs; assess patient's baseline for ADL function and identify physical deficits which impact ability to perform ADLs (bathing, care of mouth/teeth, toileting, grooming, dressing, etc.)  - Assess/evaluate cause of self-care deficits   - Assess range of motion  - Assess patient's mobility; develop plan if impaired  - Assess patient's need for assistive devices and provide as appropriate  - Encourage maximum independence but intervene and supervise when necessary  - Involve family in performance of ADLs  - Assess for home care needs following discharge   - Consider OT consult to assist with ADL evaluation and planning for discharge  - Provide patient education as appropriate  Outcome: Progressing  Goal: Maintains/Returns to pre admission functional level  Description: INTERVENTIONS:  - Perform AM-PAC 6 Click Basic Mobility/ Daily Activity assessment daily.  - Set and communicate daily mobility goal to care team and patient/family/caregiver.   - Collaborate with rehabilitation services on mobility goals if consulted  - Out of bed for  toileting  - Record patient progress and toleration of activity level   Outcome: Progressing     Problem: DISCHARGE PLANNING  Goal: Discharge to home or other facility with appropriate resources  Description: INTERVENTIONS:  - Identify barriers to discharge w/patient and caregiver  - Arrange for needed discharge resources and transportation as appropriate  - Identify discharge learning needs (meds, wound care, etc.)  - Arrange for interpretive services to assist at discharge as needed  - Refer to Case Management Department for coordinating discharge planning if the patient needs post-hospital services based on physician/advanced practitioner order or complex needs related to functional status, cognitive ability, or social support system  Outcome: Progressing     Problem: Knowledge Deficit  Goal: Patient/family/caregiver demonstrates understanding of disease process, treatment plan, medications, and discharge instructions  Description: Complete learning assessment and assess knowledge base.  Interventions:  - Provide teaching at level of understanding  - Provide teaching via preferred learning methods  Outcome: Progressing     Problem: Nutrition/Hydration-ADULT  Goal: Nutrient/Hydration intake appropriate for improving, restoring or maintaining nutritional needs  Description: Monitor and assess patient's nutrition/hydration status for malnutrition. Collaborate with interdisciplinary team and initiate plan and interventions as ordered.  Monitor patient's weight and dietary intake as ordered or per policy. Utilize nutrition screening tool and intervene as necessary. Determine patient's food preferences and provide high-protein, high-caloric foods as appropriate.     INTERVENTIONS:  - Monitor oral intake, urinary output, labs, and treatment plans  - Assess nutrition and hydration status and recommend course of action  - Evaluate amount of meals eaten  - Assist patient with eating if necessary   - Allow adequate time for  meals  - Recommend/ encourage appropriate diets, oral nutritional supplements, and vitamin/mineral supplements  - Order, calculate, and assess calorie counts as needed  - Recommend, monitor, and adjust tube feedings and TPN/PPN based on assessed needs  - Assess need for intravenous fluids  - Provide specific nutrition/hydration education as appropriate  - Include patient/family/caregiver in decisions related to nutrition  Outcome: Progressing

## 2024-01-13 NOTE — PROGRESS NOTES
"Progress Note - Orthopedics   Kemal Huddleston 68 y.o. male MRN: [id number]  Unit/Bed#: W -01      Subjective:    68 y.o.male seen and evaluated at beside. Patient notes he is doing well. Patient denies pain. Notes he is eager to work with PT today. No numbness, tingling. No fever, chills.   Labs:  0   Lab Value Date/Time    HCT 38.1 01/13/2024 0543    HCT 39.8 01/12/2024 0518    HCT 41.4 01/11/2024 2027    HGB 12.6 01/13/2024 0543    HGB 13.0 01/12/2024 0518    HGB 13.6 01/11/2024 2027    INR 0.98 01/10/2024 1347    WBC 8.02 01/13/2024 0543    WBC 9.37 01/12/2024 0518    WBC 8.97 01/11/2024 0633       Meds:    Current Facility-Administered Medications:     acetaminophen (TYLENOL) tablet 650 mg, 650 mg, Oral, Q6H MARYJO, Andrae Mary PA-C, 650 mg at 01/13/24 0539    amLODIPine (NORVASC) tablet 5 mg, 5 mg, Oral, Daily, Navya Blair MD, 5 mg at 01/13/24 0807    enoxaparin (LOVENOX) subcutaneous injection 30 mg, 30 mg, Subcutaneous, Q12H, Andrae Mary PA-C, 30 mg at 01/13/24 0045    losartan (COZAAR) tablet 50 mg, 50 mg, Oral, Daily, 50 mg at 01/13/24 0807 **AND** hydrochlorothiazide (HYDRODIURIL) tablet 12.5 mg, 12.5 mg, Oral, Daily, Navya Blair MD, 12.5 mg at 01/13/24 0807    HYDROmorphone HCl (DILAUDID) injection 0.2 mg, 0.2 mg, Intravenous, Q2H PRN, Andrae Mary PA-C    labetalol (NORMODYNE) injection 10 mg, 10 mg, Intravenous, Q15 Min PRN, Andrae Mary PA-C    naloxone (NARCAN) 0.04 mg/mL syringe 0.04 mg, 0.04 mg, Intravenous, Q1MIN PRN, Andrae Mary PA-C    ondansetron (ZOFRAN) injection 4 mg, 4 mg, Intravenous, Q4H PRN, Andrae Mary PA-C    oxyCODONE (ROXICODONE) split tablet 2.5 mg, 2.5 mg, Oral, Q4H PRN **OR** oxyCODONE (ROXICODONE) IR tablet 5 mg, 5 mg, Oral, Q4H PRN, Andrae Mary PA-C, 5 mg at 01/10/24 2155    senna-docusate sodium (SENOKOT S) 8.6-50 mg per tablet 1 tablet, 1 tablet, Oral, HS, Andrae Mary PA-C    Blood Culture:   No results found for: \"BLOODCX\"    Wound Culture:   No results found " "for: \"WOUNDCULT\"    Ins and Outs:  I/O last 24 hours:  In: 2187.5 [P.O.:840; I.V.:1347.5]  Out: 2350 [Urine:2350]          Physical:  Vitals:    01/13/24 0805   BP: 118/76   Pulse:    Resp:    Temp: 98 °F (36.7 °C)   SpO2:      Musculoskeletal: right Lower Extremity  Skin with trace edema. Skin soft, compressible   Surgical dressings c/d/I   Sensation intact to saphenous, sural, tibial, superficial peroneal nerve, and deep peroneal  Motor intact to +FHL/EHL, +ankle dorsi/plantar flexion  2+ DP pulse  Digits warm and well perfused  Capillary refill < 2 seconds    Assessment:    68 y.o.male s/p right long TFN with Dr. Vaca on 1/11/24 .     Plan:  Weight bearing as tolerated to right lower extremity   Hgb 12.6 today. Will continue to monitor for ABLA and administer IVF/prbc as indicated for Greater than 2 gram drop or Hgb < 7   PT/OT  Pain control per primary team   Patient reports pain well controlled at this time   DVT ppx per primary team   Lovenox as inpatient,  BID on discharge   Medical co-morbidities being managed per primary team  Dispo: ortho stable  Patient to follow up with Dr. Vaca as outpatient.     Sarai Lowery PA-C      "

## 2024-01-13 NOTE — CASE MANAGEMENT
Case Management Progress Note    Patient name Kemal Huddleston  Location W /W -01 MRN [id number]  : 1955 Date 2024       LOS (days): 3  Geometric Mean LOS (GMLOS) (days): 2.8  Days to GMLOS:-0.3        OBJECTIVE:        Current admission status: Inpatient  Preferred Pharmacy:   [facility] - [place] [id number] [facility]  [address] [facility]  [address]  [facility] [address]  Phone: [phone] Fax: [phone]    [facility] - [place], [place] - [address]  [address]  [address]  [facility] [address]  Phone: [phone] Fax: [phone]    Primary Care Provider: Heike Nelson MD    Primary Insurance: [employer] [facility] REP  Secondary Insurance:     PROGRESS NOTE:  CM met with patient at bedside to discuss dcp. Patient and wife still unsure if they want home w/ HHC or STR, as patient does have a full flight of steps to the shower. Patient stating he would like to see his options for STR and continue to see how he progresses with therapy as well.     Referrals in [place] for STR and HHC. Patient aware he would need an insurance Auth    CM to follow up as able to continue with dcp.

## 2024-01-14 PROCEDURE — 99232 SBSQ HOSP IP/OBS MODERATE 35: CPT | Performed by: SURGERY

## 2024-01-14 PROCEDURE — 99024 POSTOP FOLLOW-UP VISIT: CPT

## 2024-01-14 RX ADMIN — ENOXAPARIN SODIUM 30 MG: 30 INJECTION SUBCUTANEOUS at 00:48

## 2024-01-14 RX ADMIN — ENOXAPARIN SODIUM 30 MG: 30 INJECTION SUBCUTANEOUS at 19:33

## 2024-01-14 RX ADMIN — HYDROCHLOROTHIAZIDE 12.5 MG: 12.5 TABLET ORAL at 08:23

## 2024-01-14 RX ADMIN — AMLODIPINE BESYLATE 5 MG: 5 TABLET ORAL at 08:23

## 2024-01-14 RX ADMIN — LOSARTAN POTASSIUM 50 MG: 50 TABLET, FILM COATED ORAL at 08:23

## 2024-01-14 NOTE — PROGRESS NOTES
"[facility]  Progress Note  Name: Kemal Huddleston I  MRN: [id number]  Unit/Bed#: W -01 I Date of Admission: 1/10/2024   Date of Service: 1/14/2024 I Hospital Day: 4    Assessment/Plan   Fall  Assessment & Plan  - Status post mechanical fall with the below noted injuries.  - Fall precautions.  - PT and OT evaluation and treatment as indicated.  - Case Management consultation for disposition planning.    * Nondisplaced intertrochanteric fracture of right femur, initial encounter for closed fracture (HCC)  Assessment & Plan  - Nondisplaced right intertrochanteric femur fracture, present on admission.  - Appreciate Orthopedic surgery evaluation, recommendations and interventions as noted.   - TXA administered in the ED.   - Patient status post ORIF of right femur fracture with IM nail insertion on 1/11/2024.  - May be weightbearing as tolerated on the right lower extremity postoperatively.  - Monitor right lower extremity neurovascular exam.  - Continue multimodal analgesic regimen.  - Continue DVT prophylaxis.  - PT and OT evaluation and treatment as indicated.  - Outpatient follow up with Orthopedic surgery for re-evaluation.    HTN (hypertension)  Assessment & Plan  - Patient with chronic history of hypertension.  - Continue current medication regimen.  - Patient with continued use of diuretic and ARB therapy despite recent operative intervention with no evidence of JENIFFER at this time.  - Resume home medication regimen on discharge as appropriate.  - Outpatient follow-up routine.               Bowel Regimen: On Senokot-S.  VTE Prophylaxis:Sequential compression device (Venodyne)  and Enoxaparin (Lovenox)     Disposition: Continue current level of care.  Anticipate need for postacute care facility placement for rehab.  Continue therapy evaluation and treat as indicated.   following for disposition planning.    Subjective   Chief Complaint: \"I'm doing better " "today.\"    Subjective: Patient states he continues to do better each day.  He notes his mobility is continuing to improve, but acknowledges that he will need some rehab before returning home independently.  He notes his pain is well-controlled and that he is pretty much weaned himself off of pain medication at this point.  He is tolerating his diet without any nausea, vomiting or constipation.  He has no new complaints at this time.     Objective   Vitals:   Temp:  [97.9 °F (36.6 °C)-99.7 °F (37.6 °C)] 99.7 °F (37.6 °C)  HR:  [74-99] 88  Resp:  [16-20] 20  BP: (104-152)/(72-89) 139/89    I/O         01/12 0701  01/13 0700 01/13 0701  01/14 0700 01/14 0701  01/15 0700    P.O. 840      I.V. (mL/kg) 1347.5 (10.4)      Total Intake(mL/kg) 2187.5 (16.8)      Urine (mL/kg/hr) 2350 (0.8) 1400 (0.4)     Stool 0      Blood       Total Output 2350 1400     Net -162.5 -1400            Unmeasured Stool Occurrence 1 x               Physical Exam:   GENERAL APPEARANCE: Patient in no acute distress.  HEENT: NCAT; EOMs intact; Mucous membranes moist  CV: Regular rate and rhythm; no murmur/gallops/rubs appreciated.  CHEST / LUNGS: Clear to auscultation; no wheezes/rales/rhonci.  ABD: NABS; soft; non-distended; non-tender.  : Voiding spontaneously.  EXT: +2 pulses bilaterally upper & lower extremities.  Minimal right thigh and hip tenderness and ecchymosis with clean/dry/intact postoperative dressings, some right hip and thigh swelling with intact neurovascular exam and soft and compressible muscle compartments.  NEURO: GCS 15; no focal neurologic deficits; neurovascularly intact.  SKIN: Warm, dry and well perfused; no rash; no jaundice.  Some bruising and ecchymosis on the right buttock and right thigh.    Invasive Devices       Peripheral Intravenous Line  Duration             Peripheral IV 01/10/24 Right Antecubital 3 days                          Lab Results: Results: I have personally reviewed all pertinent laboratory/tests " results  Imaging: I have personally reviewed pertinent reports.     Other Studies: N/A     Imer Rausch PA-C  1/14/2024  09:44 AM

## 2024-01-14 NOTE — ARC ADMISSION
Referral received for patient consideration of ARC placement for rehab.  Will review with physician and update CM with determination when known.

## 2024-01-14 NOTE — PLAN OF CARE
Problem: PAIN - ADULT  Goal: Verbalizes/displays adequate comfort level or baseline comfort level  Description: Interventions:  - Encourage patient to monitor pain and request assistance  - Assess pain using appropriate pain scale  - Administer analgesics based on type and severity of pain and evaluate response  - Implement non-pharmacological measures as appropriate and evaluate response  - Consider cultural and social influences on pain and pain management  - Notify physician/advanced practitioner if interventions unsuccessful or patient reports new pain  Outcome: Progressing     Problem: INFECTION - ADULT  Goal: Absence or prevention of progression during hospitalization  Description: INTERVENTIONS:  - Assess and monitor for signs and symptoms of infection  - Monitor lab/diagnostic results  - Monitor all insertion sites, i.e. indwelling lines, tubes, and drains  - Monitor endotracheal if appropriate and nasal secretions for changes in amount and color  - [place] appropriate cooling/warming therapies per order  - Administer medications as ordered  - Instruct and encourage patient and family to use good hand hygiene technique  - Identify and instruct in appropriate isolation precautions for identified infection/condition  Outcome: Progressing

## 2024-01-14 NOTE — PROGRESS NOTES
"Progress Note - Orthopedics   Kemal Huddleston 68 y.o. male MRN: [id number]  Unit/Bed#: W -01      Subjective:    68 y.o.male seen and evaluated at beside. Patient notes he is doing very well. Denies any pain to right leg. Admits to doing well with PT and plans to ambulate later today.         Labs:  0   Lab Value Date/Time    HCT 38.1 01/13/2024 0543    HCT 39.8 01/12/2024 0518    HCT 41.4 01/11/2024 2027    HGB 12.6 01/13/2024 0543    HGB 13.0 01/12/2024 0518    HGB 13.6 01/11/2024 2027    INR 0.98 01/10/2024 1347    WBC 8.02 01/13/2024 0543    WBC 9.37 01/12/2024 0518    WBC 8.97 01/11/2024 0633       Meds:    Current Facility-Administered Medications:     acetaminophen (TYLENOL) tablet 650 mg, 650 mg, Oral, Q6H MARYJO, Andrae Mary PA-C, 650 mg at 01/13/24 1237    amLODIPine (NORVASC) tablet 5 mg, 5 mg, Oral, Daily, Navya Blair MD, 5 mg at 01/14/24 0823    enoxaparin (LOVENOX) subcutaneous injection 30 mg, 30 mg, Subcutaneous, Q12H, Andrae Mary PA-C, 30 mg at 01/14/24 0048    losartan (COZAAR) tablet 50 mg, 50 mg, Oral, Daily, 50 mg at 01/14/24 0823 **AND** hydrochlorothiazide (HYDRODIURIL) tablet 12.5 mg, 12.5 mg, Oral, Daily, Navya Blair MD, 12.5 mg at 01/14/24 0823    HYDROmorphone HCl (DILAUDID) injection 0.2 mg, 0.2 mg, Intravenous, Q2H PRN, Andrae Mary PA-C    labetalol (NORMODYNE) injection 10 mg, 10 mg, Intravenous, Q15 Min PRN, Andrae Mary PA-C    naloxone (NARCAN) 0.04 mg/mL syringe 0.04 mg, 0.04 mg, Intravenous, Q1MIN PRN, Andrae Mary PA-C    ondansetron (ZOFRAN) injection 4 mg, 4 mg, Intravenous, Q4H PRN, Andrae Mary PA-C    oxyCODONE (ROXICODONE) split tablet 2.5 mg, 2.5 mg, Oral, Q4H PRN **OR** oxyCODONE (ROXICODONE) IR tablet 5 mg, 5 mg, Oral, Q4H PRN, Andrae Mary PA-C, 5 mg at 01/10/24 2155    senna-docusate sodium (SENOKOT S) 8.6-50 mg per tablet 1 tablet, 1 tablet, Oral, HS, Andrae Mary PA-C    Blood Culture:   No results found for: \"BLOODCX\"    Wound Culture:   No results " "found for: \"WOUNDCULT\"    Ins and Outs:  I/O last 24 hours:  In: -   Out: 1400 [Urine:1400]          Physical:  Vitals:    01/14/24 0818   BP: 139/89   Pulse: 88   Resp: 20   Temp: 99.7 °F (37.6 °C)   SpO2: (!) 87%     Musculoskeletal: right Lower Extremity   Surgical dressings c/d/I   Trace edema. Skin soft, compressible  Mild bruising around most proximal incisional site   Sensation intact to saphenous, sural, tibial, superficial peroneal nerve, and deep peroneal  Motor intact to +FHL/EHL, +ankle dorsi/plantar flexion  2+ DP pulse  Digits warm and well perfused  Capillary refill < 2 seconds    Assessment:    68 y.o.male s/p right long TFN with Dr. Vaca on 1/11/24 .     Plan:  Weight bearing as tolerated to right lower extremity   Hgb stable. Will continue to monitor for ABLA and administer IVF/prbc as indicated for Greater than 2 gram drop or Hgb < 7   PT/OT  Pain control per primary team  Pain well controlled   DVT ppx per primary team   Lovenox as inpatient,  BID on discharge   Medical co-morbidities being managed per primary team  Dispo: ortho stable  Patient to follow up with Dr. Vaca as outpatient.     Sarai Lowery PA-C      "

## 2024-01-14 NOTE — ASSESSMENT & PLAN NOTE
- Nondisplaced right intertrochanteric femur fracture, present on admission.  - Appreciate Orthopedic surgery evaluation, recommendations and interventions as noted.   - TXA administered in the ED.   - Patient status post ORIF of right femur fracture with IM nail insertion on 1/11/2024.  - May be weightbearing as tolerated on the right lower extremity postoperatively.  - Monitor right lower extremity neurovascular exam.  - Continue multimodal analgesic regimen.  - Continue DVT prophylaxis.  - PT and OT evaluation and treatment as indicated.  - Outpatient follow up with Orthopedic surgery for re-evaluation.

## 2024-01-15 ENCOUNTER — HOME HEALTH ADMISSION (OUTPATIENT)
Dept: HOME HEALTH SERVICES | Facility: HOME HEALTHCARE | Age: 69
End: 2024-01-15
Payer: COMMERCIAL

## 2024-01-15 PROCEDURE — 99232 SBSQ HOSP IP/OBS MODERATE 35: CPT | Performed by: SURGERY

## 2024-01-15 PROCEDURE — 97535 SELF CARE MNGMENT TRAINING: CPT

## 2024-01-15 PROCEDURE — 97110 THERAPEUTIC EXERCISES: CPT

## 2024-01-15 PROCEDURE — 99024 POSTOP FOLLOW-UP VISIT: CPT | Performed by: PHYSICIAN ASSISTANT

## 2024-01-15 PROCEDURE — 97530 THERAPEUTIC ACTIVITIES: CPT

## 2024-01-15 PROCEDURE — 97116 GAIT TRAINING THERAPY: CPT

## 2024-01-15 RX ADMIN — HYDROCHLOROTHIAZIDE 12.5 MG: 12.5 TABLET ORAL at 09:10

## 2024-01-15 RX ADMIN — ENOXAPARIN SODIUM 30 MG: 30 INJECTION SUBCUTANEOUS at 06:16

## 2024-01-15 RX ADMIN — AMLODIPINE BESYLATE 5 MG: 5 TABLET ORAL at 09:10

## 2024-01-15 RX ADMIN — LOSARTAN POTASSIUM 50 MG: 50 TABLET, FILM COATED ORAL at 09:10

## 2024-01-15 RX ADMIN — ENOXAPARIN SODIUM 30 MG: 30 INJECTION SUBCUTANEOUS at 18:07

## 2024-01-15 NOTE — CASE MANAGEMENT
Case Management Progress Note    Patient name Kemal Huddleston  Location W /W -01 MRN [id number]  : 1955 Date 1/15/2024       LOS (days): 5  Geometric Mean LOS (GMLOS) (days): 2.8  Days to GMLOS:-2.2        OBJECTIVE:        Current admission status: Inpatient  Preferred Pharmacy:   [facility] - [place] [id number] [address]  Phone: [phone] Fax: [phone]    [facility] - [place], [place] - [address]  [address]  [facility] [address]  [facility] [address]  Phone: [phone] Fax: [phone]    Primary Care Provider: Heike Nelson MD    Primary Insurance: [employer] REP  Secondary Insurance:     PROGRESS NOTE:  Pt has accepted the offered [facility] services of [facility].

## 2024-01-15 NOTE — PROGRESS NOTES
"[facility]  Progress Note  Name: Kemal Huddleston I  MRN: [id number]  Unit/Bed#: W -01 I Date of Admission: 1/10/2024   Date of Service: 1/15/2024 I Hospital Day: 5    Assessment/Plan   Fall  Assessment & Plan  - Status post mechanical fall with the below noted injuries.  - Fall precautions.  - PT and OT evaluation and treatment as indicated.  - Case Management consultation for disposition planning.    * Nondisplaced intertrochanteric fracture of right femur, initial encounter for closed fracture (HCC)  Assessment & Plan  - Nondisplaced right intertrochanteric femur fracture, present on admission.  - Appreciate Orthopedic surgery evaluation, recommendations and interventions as noted.   - TXA administered in the ED.   - Patient status post ORIF of right femur fracture with IM nail insertion on 1/11/2024.  - May be weightbearing as tolerated on the right lower extremity postoperatively.  - Monitor right lower extremity neurovascular exam.  - Continue multimodal analgesic regimen.  - Continue DVT prophylaxis.  - PT and OT evaluation and treatment as indicated.  - Outpatient follow up with Orthopedic surgery for re-evaluation.    HTN (hypertension)  Assessment & Plan  - Patient with chronic history of hypertension.  - Continue current medication regimen.  - Patient with continued use of diuretic and ARB therapy despite recent operative intervention with no evidence of JENIFFER at this time.  - Resume home medication regimen on discharge as appropriate.  - Outpatient follow-up routine.               Bowel Regimen: On Senokot-S.  VTE Prophylaxis:Sequential compression device (Venodyne)  and Enoxaparin (Lovenox)     Disposition: Continue current level of care.  Anticipate discharge to postacute care facility for rehab.  Continue therapy evaluation and treatment as indicated.  Case management following for disposition planning.    Subjective   Chief Complaint: \"I'm doing well.\"    Subjective: " Patient continues to do well and notes minimal pain and soreness in his right hip, primarily with activity.  He notes he continues to do most things independently, but still struggles some when working with therapy on the stairs.  He is tolerating oral intake without nausea, vomiting or constipation.  He has no new complaints today.     Objective   Vitals:   Temp:  [97.7 °F (36.5 °C)-99.4 °F (37.4 °C)] 97.7 °F (36.5 °C)  HR:  [85-93] 93  Resp:  [16] 16  BP: (117-142)/(69-78) 142/77    I/O         01/13 0701  01/14 0700 01/14 0701  01/15 0700 01/15 0701  01/16 0700    P.O.  960     I.V. (mL/kg)       Total Intake(mL/kg)  960 (7.4)     Urine (mL/kg/hr) 1400 (0.4)  650 (0.9)    Stool       Total Output 1400  650    Net -1400 +960 -650           Unmeasured Urine Occurrence  5 x     Unmeasured Stool Occurrence  2 x              Physical Exam:   GENERAL APPEARANCE: Patient in no acute distress.  HEENT: NCAT; EOMs intact; Mucous membranes moist  CV: Regular rate and rhythm; no murmur/gallops/rubs appreciated.  CHEST / LUNGS: Clear to auscultation; no wheezes/rales/rhonci.  ABD: NABS; soft; non-distended; non-tender.  : Voiding spontaneously.  EXT: +2 pulses bilaterally upper & lower extremities; no edema.  Improved swelling in the right hip and thigh with clean/dry/intact postoperative dressings in place and mild tenderness with intact neurovascular exam throughout the right lower extremity.  NEURO: GCS 15; no focal neurologic deficits; neurovascularly intact.  SKIN: Warm, dry and well perfused; no rash; no jaundice.  Evolving/resolving ecchymosis on the right buttock and right hip and thigh.    Invasive Devices       Peripheral Intravenous Line  Duration             Peripheral IV 01/10/24 Right Antecubital 4 days                          Lab Results: Results: I have personally reviewed all pertinent laboratory/tests results  Imaging: I have personally reviewed pertinent reports.     Other Studies: N/A     Imer Rausch  BETSEY  1/15/2024  09:46 AM

## 2024-01-15 NOTE — OCCUPATIONAL THERAPY NOTE
Occupational Therapy Treatment Note       01/15/24 1031   OT Last Visit   OT Visit Date 01/15/24   Note Type   Note Type Treatment   Pain Assessment   Pain Assessment Tool 0-10   Pain Score No Pain   Restrictions/Precautions   Weight Bearing Precautions Per Order Yes   RLE Weight Bearing Per Order WBAT   Other Precautions Chair Alarm;Bed Alarm;Fall Risk   ADL   Eating Assistance 7  Independent   Eating Deficit Beverage management   Grooming Assistance 5  Supervision/Setup   Grooming Deficit Wash/dry hands;Wash/dry face;Brushing hair  (Standing to sink unsupported)   UB Dressing Assistance 7  Independent   UB Dressing Deficit   ([facility])   LB Dressing Assistance 5  Supervision/Setup   LB Dressing Comments Don/doff hospital scrub pants; Don/do bilateral socks; Increased time to complete lower body dressing task due to R hip discomfort ; min verbal cues for dressing operative leg first when donning pants; Patient able to stand to pull up/down over hips with supervision; Pt with good carryover of OT education re:lower body ADLs   Toileting Assistance  5  Supervision/Setup   Toileting Deficit Clothing management up;Clothing management down;Perineal hygiene  (Simulated at toilet; ;decreased urgency at time of attempt)   Bed Mobility   Additional Comments Not assessed, patient received seated OOB in recliner chair at start of session   Transfers   Sit to Stand 5  Supervision   Additional items Assist x 1;Verbal cues   Stand to Sit 5  Supervision   Additional items Assist x 1;Verbal cues   Additional Comments STS x several trials throughout session, support of RW   Functional Mobility   Functional Mobility 5  Supervision   Additional Comments Patient ambulated short household distance to/from bathroom with RW and supervision; Increased time to complete due to hip discomfort   Toilet Transfers   Toilet Transfer Type To and from   Toilet Transfer to Standard toilet   Toilet Transfer Technique Ambulating    Toilet Transfers Supervision   Toilet Transfers Comments Ambulatory transfer to/from standard height toilet with RW and supervision, pt utilizing hand rail of commode placed to left of toilet for support; Educated patient on use of BSC over toilet at home for increased height and bilateral hand rails to increase independence and increase comfort during toilet transfers   Cognition   Overall Cognitive Status WFL   Arousal/Participation Alert;Cooperative   Attention Within functional limits   Orientation Level Oriented X4   Memory Within functional limits   Following Commands Follows all commands and directions without difficulty   Activity Tolerance   Activity Tolerance Patient limited by fatigue   Assessment   Assessment Patient seen for OT treatment this AM. Patient pleasant and motivated to participate in OT session and to return to OF. Patient with no c/o pain throughout session, able to complete all functional transfers and mobility with RW at a supervision level. Completed lower body ADLs with supervision; increased time due to increased hip discomfort. Patient with good understanding and carryover of OT education regarding all ADLs and functional transfers. The patient's raw score on the AM-PAC Daily Activity Inpatient Short Form is 21. A raw score of greater than or equal to 19 suggests the patient may benefit from discharge to home. Please refer to the recommendation of the Occupational Therapist for safe discharge planning. Patient is progressing well towards OT goals of care and is highly motivated to return to independent level of function. At end of session patient seated in recliner chair with all needs met. Continue OT per POC.   Plan   OT Frequency 3-5x/wk   Discharge Recommendation   Rehab Resource Intensity Level, OT III (Minimum Resource Intensity)   Equipment Recommended Bedside commode   Commode Type Wide   AM-PAC Daily Activity Inpatient   Lower Body Dressing 3   Bathing 3   Toileting 3    Upper Body Dressing 4   Grooming 4   Eating 4   Daily Activity Raw Score 21   Daily Activity Standardized Score (Calc for Raw Score >=11) 44.27   AM-PAC Applied Cognition Inpatient   Following a Speech/Presentation 4   Understanding Ordinary Conversation 4   Taking Medications 4   Remembering Where Things Are Placed or Put Away 4   Remembering List of 4-5 Errands 4   Taking Care of Complicated Tasks 4   Applied Cognition Raw Score 24   Applied Cognition Standardized Score 62.21     Barbara Huggins, OTR/L

## 2024-01-15 NOTE — PROGRESS NOTES
"Progress Note - Orthopedics   Kemal Huddleston 68 y.o. male MRN: [id number]  Unit/Bed#: W -01      Subjective:    68 y.o.male patient seen and evaluated at bedside. He notes he feels well this morning. No pain. About to work with physical therapy at the time of my visit.     Labs:  0   Lab Value Date/Time    HCT 38.1 01/13/2024 0543    HCT 39.8 01/12/2024 0518    HCT 41.4 01/11/2024 2027    HGB 12.6 01/13/2024 0543    HGB 13.0 01/12/2024 0518    HGB 13.6 01/11/2024 2027    INR 0.98 01/10/2024 1347    WBC 8.02 01/13/2024 0543    WBC 9.37 01/12/2024 0518    WBC 8.97 01/11/2024 0633       Meds:    Current Facility-Administered Medications:     acetaminophen (TYLENOL) tablet 650 mg, 650 mg, Oral, Q6H MARYJO, Andrae Mary PA-C, 650 mg at 01/13/24 1237    amLODIPine (NORVASC) tablet 5 mg, 5 mg, Oral, Daily, Navya Blair MD, 5 mg at 01/15/24 0910    enoxaparin (LOVENOX) subcutaneous injection 30 mg, 30 mg, Subcutaneous, Q12H, Andrae Mary PA-C, 30 mg at 01/15/24 0616    losartan (COZAAR) tablet 50 mg, 50 mg, Oral, Daily, 50 mg at 01/15/24 0910 **AND** hydrochlorothiazide (HYDRODIURIL) tablet 12.5 mg, 12.5 mg, Oral, Daily, Navya Blair MD, 12.5 mg at 01/15/24 0910    HYDROmorphone HCl (DILAUDID) injection 0.2 mg, 0.2 mg, Intravenous, Q2H PRN, Andrae Mary PA-C    labetalol (NORMODYNE) injection 10 mg, 10 mg, Intravenous, Q15 Min PRN, Andrae Mary PA-C    naloxone (NARCAN) 0.04 mg/mL syringe 0.04 mg, 0.04 mg, Intravenous, Q1MIN PRN, Andrae Mary PA-C    ondansetron (ZOFRAN) injection 4 mg, 4 mg, Intravenous, Q4H PRN, Andrae Mary PA-C    oxyCODONE (ROXICODONE) split tablet 2.5 mg, 2.5 mg, Oral, Q4H PRN **OR** oxyCODONE (ROXICODONE) IR tablet 5 mg, 5 mg, Oral, Q4H PRN, Andrae Mary PA-C, 5 mg at 01/10/24 2155    senna-docusate sodium (SENOKOT S) 8.6-50 mg per tablet 1 tablet, 1 tablet, Oral, HS, Andrae Mary PA-C    Blood Culture:   No results found for: \"BLOODCX\"    Wound Culture:   No results found for: " "\"WOUNDCULT\"    Ins and Outs:  I/O last 24 hours:  In: 960 [P.O.:960]  Out: 650 [Urine:650]          Physical:  Vitals:    01/15/24 0808   BP: 125/69   Pulse: 85   Resp: 16   Temp: 99.4 °F (37.4 °C)   SpO2: 92%     Musculoskeletal: right Lower Extremity  Surgical dressings c/d/I without strikethrough  Some surrounding bruising, expected.   Thigh and calf soft and compressible.   Sensation intact to saphenous, sural, tibial, superficial peroneal nerve, and deep peroneal  Motor intact to +FHL/EHL, +ankle dorsi/plantar flexion  2+ DP pulse  Digits warm and well perfused  Capillary refill < 2 seconds    Assessment:    68 y.o.male s/p right long TFN with Dr. Vaca 1/11/2024 (POD 4).     Plan:  WBAT to the right lower extremity.   Will monitor for ABLA and administer IVF/prbc as indicated for Greater than 2 gram drop or Hgb < 7, defer to primary team.   PT/OT  Pain control per primary team.   DVT ppx: recommend ASA 325mg BID upon discharge.   Medical management per primary team.   Dispo: Ortho will follow  Should follow up with Dr Wilson upon discharge.     Cyndi Duong PA-C      "

## 2024-01-15 NOTE — PHYSICAL THERAPY NOTE
PHYSICAL THERAPY NOTE          Patient Name: Kemal Huddleston  Today's Date: 1/15/2024           01/15/24 0900   PT Last Visit   PT Visit Date 01/15/24   Note Type   Note Type Treatment   Pain Assessment   Pain Assessment Tool 0-10   Pain Score No Pain   Pain Location/Orientation Orientation: Right;Location: Leg;Location: Hip   Pain Onset/Description Onset: Ongoing   Effect of Pain on Daily Activities limits speed of ambulation   Patient's Stated Pain Goal No pain   Hospital Pain Intervention(s) Repositioned;Ambulation/increased activity;Rest   Multiple Pain Sites No   Restrictions/Precautions   Weight Bearing Precautions Per Order Yes   RLE Weight Bearing Per Order WBAT   Other Precautions Chair Alarm;Bed Alarm   General   Chart Reviewed Yes   Additional Pertinent History pt cotninues to report 0/10 pain but reports soreness from R hip to R knee   Response to Previous Treatment Patient with no complaints from previous session.   Family/Caregiver Present No   Cognition   Overall Cognitive Status WFL   Arousal/Participation Alert;Responsive;Cooperative   Attention Attends with cues to redirect   Orientation Level Oriented X4   Memory Within functional limits   Following Commands Follows one step commands with increased time or repetition   Comments pt cotninues to be pleasant and cooperative throughout PT intervention   Subjective   Subjective pt was agreeable to participate in PT intervention. pt stated 0/10 pain pre/post tx session but reposrt soreness from R hip to R knee   Bed Mobility   Supine to Sit 5  Supervision   Additional items Assist x 1;HOB elevated;Bedrails;Increased time required;Verbal cues   Sit to Supine Unable to assess   Additional Comments pt seated OOB in the recliner post tx session with call bell and all pt needs met   Transfers   Sit to Stand 5  Supervision   Additional items Assist x 1;Increased time  required;Verbal cues   Stand to Sit 5  Supervision   Additional items Assist x 1;Armrests;Increased time required;Verbal cues   Stand pivot 5  Supervision   Additional items Assist x 1;Armrests;Increased time required;Verbal cues   Additional Comments pt continues to remain consistant with requiring /s for all functional transfers to and from RW with VC's for hand placement while ascending to RW and descending into recliner   Ambulation/Elevation   Gait pattern Decreased R stance;Decreased foot clearance;Antalgic;Short stride;Excessively slow;Decreased hip extension   Gait Assistance 5  Supervision  (close /s)   Additional items Assist x 1;Verbal cues   Assistive Device Rolling walker  (wide RW)   Distance 40'x1 RW   Stair Management Assistance (S)  5  Supervision  (pt was able to complete 3 steps with bilateral hand rails and close /s but required min Ax1 to complete step with only R sided hand rail as pt attemted step with BUE on R sided hand rail)   Additional items Assist x 1;Verbal cues   Stair Management Technique Two rails;Step to pattern;Backward;Foreward   Number of Stairs 3  (3 steps wiwth B hand rails, 1 step with min ax1 using only R sided hand rail and B UE surrport on R sided hand rail)   Balance   Static Sitting Good   Dynamic Sitting Fair +   Static Standing Fair -   Dynamic Standing Poor +   Ambulatory Fair -  (close /s)   Endurance Deficit   Endurance Deficit Yes   Endurance Deficit Description limited activity toleranec and ambulation distance   Activity Tolerance   Activity Tolerance Patient tolerated treatment well   Nurse Made Aware Spoke to RN   Exercises   Quad Sets Supine;10 reps;AROM;Right   Hip Abduction Sitting;15 reps;AROM;Bilateral   Hip Adduction Sitting;15 reps;AROM;Bilateral  (pillow squeezes)   Knee AROM Long Arc Quad Sitting;15 reps;AROM;Right   Ankle Pumps Sitting;20 reps;AROM;Bilateral   Marching Sitting;10 reps;AAROM;Right   Assessment   Prognosis Fair   Problem List Decreased  strength;Decreased range of motion;Decreased endurance;Impaired balance;Decreased mobility;Decreased skin integrity;Orthopedic restrictions;Pain   Assessment pt  began tx session lying supine in the bed and was agreeable to participate in PT intervention. PtT intervention consisted of TA for facilitation of transitional movements in order to increase safety with bed mobility, functional transfers, posture/balance w/ ambulation and stair trials. Progress was noted this tx session as pt was able to perorm bed mobility, transfers to and from RW and ambulation with RW with close /s and no LOB. pt does remain limited with activity tolerance and ambulation distance as pt ambulated 40'x1 RW w/o LOB but required a therapeutic seated rest break post ambulation. pt eduvcated on stair trials prior to initiating steps with verabl/visual demonstration. pt completd 3 steps with bilateral hand rails and close /s but required min Ax1 when attempting to do steps with only R sided hand rail in order to simulate home environment. Post tx session pt in recliner with call bell and all pt needs met. pt would benefit from cotninued skilled PT intervention in order to increase activity tolerance, ambulation distance and steps completed with less assistance required   Goals   Patient Goals to play golf soon   STG Expiration Date 01/22/24   PT Treatment Day 3   Plan   Treatment/Interventions Functional transfer training;LE strengthening/ROM;Elevations;Therapeutic exercise;Endurance training;Patient/family training;Equipment eval/education;Bed mobility;Gait training;Spoke to nursing   Progress Slow progress, decreased activity tolerance   PT Frequency 4-6x/wk   Discharge Recommendation   Rehab Resource Intensity Level, PT II (Moderate Resource Intensity)   Equipment Recommended Walker   AM-PAC Basic Mobility Inpatient   Turning in Flat Bed Without Bedrails 3   Lying on Back to Sitting on Edge of Flat Bed Without Bedrails 3   Moving Bed to  Chair 3   Standing Up From Chair Using Arms 3   Walk in Room 3   Climb 3-5 Stairs With Railing 3   Basic Mobility Inpatient Raw Score 18   Basic Mobility Standardized Score 41.05   Highest Level Of Mobility   JH-HLM Goal 6: Walk 10 steps or more   JH-HLM Achieved 7: Walk 25 feet or more   Education   Education Provided Mobility training;Assistive device;Other  (TE activities and stair trials)   Patient Demonstrates acceptance/verbal understanding   End of Consult   Patient Position at End of Consult Bedside chair;Bed/Chair alarm activated;All needs within reach   The patient's AM-PAC Basic Mobility Inpatient Short Form Raw Score is 18. A Raw score of greater than 16 suggests the patient may benefit from discharge to home. Please also refer to the recommendation of the Physical Therapist for safe discharge planning.    Pt continues to be limited with activity tolerance, ambulation distance and steps completed with only R sided hand rail in order to simulate house environment     Kartik Morocho

## 2024-01-15 NOTE — CASE MANAGEMENT
Case Management Discharge Planning Note    Patient name Kemal Huddleston  Location W /W -01 MRN [id number]  : 1955 Date 1/15/2024       Current Admission Date: 1/10/2024  Current Admission Diagnosis:Nondisplaced intertrochanteric fracture of right femur, initial encounter for closed fracture (HCC)   Patient Active Problem List    Diagnosis Date Noted    Fall 01/10/2024    Nondisplaced intertrochanteric fracture of right femur, initial encounter for closed fracture (HCC) 01/10/2024    Diverticulosis 2023    Annual physical exam 2021    Prostate cancer screening 07/10/2020    HTN (hypertension) 07/10/2020    Class 2 severe obesity due to excess calories with serious comorbidity and body mass index (BMI) of 39.0 to 39.9 in adult  07/10/2020      LOS (days): 5  Geometric Mean LOS (GMLOS) (days): 2.8  Days to GMLOS:-2.2     OBJECTIVE:  Risk of Unplanned Readmission Score: 7.01         Current admission status: Inpatient   Preferred Pharmacy:   [facility] - [place] [id number] [facility]  [address] [address]  Phone: [phone] Fax: [phone]    [facility] - [facility] [address]  [address]  [facility] [address]  [facility] [address]  Phone: [phone] Fax: [phone]    Primary Care Provider: Heike Nelson MD    Primary Insurance: [employer] [facility] REP  Secondary Insurance:     DISCHARGE DETAILS:    Discharge planning discussed with:: Patient, son and spouse Tamra via phone     Comments - [place] [facility] Choice: CM met with Pt, son and spouse via phone to discuss d/c recommendations and his recent improvements with OT. Pt reported he would prefer to go home with [facility] services and the recommended roller walker and bedside commode.       Contacts  Patient Contacts: Tamra  Relationship to Patient:: Family  Contact Method: Phone  Phone Number: [phone]  Reason/Outcome: Discharge Planning,  Referral    Requested Home Health Care         Is the patient interested in HHC at discharge?: Yes  Home Health Discipline requested:: Occupational Therapy, Physical Therapy  Home Health Agency Name:: Other  Home Health Follow-Up Provider:: PCP  Home Health Services Needed:: Gait/ADL Training, Strengthening/Theraputic Exercises to Improve Function  Homebound Criteria Met:: Requires the Assistance of Another Person for Safe Ambulation or to Leave the Home, Uses an Assist Device (i.e. cane, walker, etc)  Supporting Clincal Findings:: Fatigues Easliy in Short Distances, Limited Endurance    DME Referral Provided  Referral made for DME?: Yes  DME referral completed for the following items:: Bedside Commode, Walker  DME Supplier Name:: [email or web address]    Other Referral/Resources/Interventions Provided:  Referral Comments: Pt and spouse have confirmed their preference of a d/c to home with HHC services for PT and OT, a roller walker and bedside commode. CM has made HHC and DME referrals for the Pt's request.         Treatment Team Recommendation: Home with Home Health Care  Discharge Destination Plan:: Home with Home Health Care

## 2024-01-15 NOTE — PLAN OF CARE
Problem: OCCUPATIONAL THERAPY ADULT  Goal: Performs self-care activities at highest level of function for planned discharge setting.  See evaluation for individualized goals.  Description: Treatment Interventions: ADL retraining, Functional transfer training, Endurance training, Patient/family training, Equipment evaluation/education, Compensatory technique education, Continued evaluation, Activityengagement, Energy conservation  Equipment Recommended: Bedside commode       See flowsheet documentation for full assessment, interventions and recommendations.   Outcome: Progressing  Note: Limitation: Decreased ADL status, Decreased Safe judgement during ADL, Decreased endurance, Decreased self-care trans, Decreased high-level ADLs (pain, balance, fxnl mobility, act malvin, fxnl reach, standing malvin, and strength, attention to task, safety awareness, insight, and pacing, initiation/termination of conversation)  Prognosis: Good  Assessment: Patient seen for OT treatment this AM. Patient pleasant and motivated to participate in OT session and to return to OF. Patient with no c/o pain throughout session, able to complete all functional transfers and mobility with RW at a supervision level. Completed lower body ADLs with supervision; increased time due to increased hip discomfort. Patient with good understanding and carryover of OT education regarding all ADLs and functional transfers. The patient's raw score on the AM-PAC Daily Activity Inpatient Short Form is 21. A raw score of greater than or equal to 19 suggests the patient may benefit from discharge to home. Please refer to the recommendation of the Occupational Therapist for safe discharge planning. Patient is progressing well towards OT goals of care and is highly motivated to return to independent level of function. At end of session patient seated in recliner chair with all needs met. Continue OT per POC.     Rehab Resource Intensity Level, OT: III (Minimum  Resource Intensity)

## 2024-01-15 NOTE — PLAN OF CARE
Problem: Potential for Falls  Goal: Patient will remain free of falls  Description: INTERVENTIONS:  - Educate patient/family on patient safety including physical limitations  - Instruct patient to call for assistance with activity   - Consult OT/PT to assist with strengthening/mobility   - Keep Call bell within reach  - Keep bed low and locked with side rails adjusted as appropriate  - Keep care items and personal belongings within reach  - Initiate and maintain comfort rounds  - Make Fall Risk Sign visible to staff  - Offer Toileting every  Hours, in advance of need  - Initiate/Maintain alarm  - Obtain necessary fall risk management equipment:   - Apply yellow socks and bracelet for high fall risk patients  - Consider moving patient to room near nurses station  Outcome: Progressing     Problem: PAIN - ADULT  Goal: Verbalizes/displays adequate comfort level or baseline comfort level  Description: Interventions:  - Encourage patient to monitor pain and request assistance  - Assess pain using appropriate pain scale  - Administer analgesics based on type and severity of pain and evaluate response  - Implement non-pharmacological measures as appropriate and evaluate response  - Consider cultural and social influences on pain and pain management  - Notify physician/advanced practitioner if interventions unsuccessful or patient reports new pain  Outcome: Progressing     Problem: INFECTION - ADULT  Goal: Absence or prevention of progression during hospitalization  Description: INTERVENTIONS:  - Assess and monitor for signs and symptoms of infection  - Monitor lab/diagnostic results  - Monitor all insertion sites, i.e. indwelling lines, tubes, and drains  - Monitor endotracheal if appropriate and nasal secretions for changes in amount and color  - [place] appropriate cooling/warming therapies per order  - Administer medications as ordered  - Instruct and encourage patient and family to use good hand hygiene technique  -  Identify and instruct in appropriate isolation precautions for identified infection/condition  Outcome: Progressing  Goal: Absence of fever/infection during neutropenic period  Description: INTERVENTIONS:  - Monitor WBC    Outcome: Progressing     Problem: SAFETY ADULT  Goal: Patient will remain free of falls  Description: INTERVENTIONS:  - Educate patient/family on patient safety including physical limitations  - Instruct patient to call for assistance with activity   - Consult OT/PT to assist with strengthening/mobility   - Keep Call bell within reach  - Keep bed low and locked with side rails adjusted as appropriate  - Keep care items and personal belongings within reach  - Initiate and maintain comfort rounds  - Make Fall Risk Sign visible to staff  - Offer Toileting every  Hours, in advance of need  - Initiate/Maintain alarm  - Obtain necessary fall risk management equipment:   - Apply yellow socks and bracelet for high fall risk patients  - Consider moving patient to room near nurses station  Outcome: Progressing  Goal: Maintain or return to baseline ADL function  Description: INTERVENTIONS:  -  Assess patient's ability to carry out ADLs; assess patient's baseline for ADL function and identify physical deficits which impact ability to perform ADLs (bathing, care of mouth/teeth, toileting, grooming, dressing, etc.)  - Assess/evaluate cause of self-care deficits   - Assess range of motion  - Assess patient's mobility; develop plan if impaired  - Assess patient's need for assistive devices and provide as appropriate  - Encourage maximum independence but intervene and supervise when necessary  - Involve family in performance of ADLs  - Assess for home care needs following discharge   - Consider OT consult to assist with ADL evaluation and planning for discharge  - Provide patient education as appropriate  Outcome: Progressing  Goal: Maintains/Returns to pre admission functional level  Description:  INTERVENTIONS:  - Perform AM-PAC 6 Click Basic Mobility/ Daily Activity assessment daily.  - Set and communicate daily mobility goal to care team and patient/family/caregiver.   - Collaborate with rehabilitation services on mobility goals if consulted  - Perform Range of Motion  times a day.  - Reposition patient every  hours.  - Dangle patient  times a day  - Stand patient  times a day  - Ambulate patient  times a day  - Out of bed to chair  times a day   - Out of bed for meal times a day  - Out of bed for toileting  - Record patient progress and toleration of activity level   Outcome: Progressing     Problem: DISCHARGE PLANNING  Goal: Discharge to home or other facility with appropriate resources  Description: INTERVENTIONS:  - Identify barriers to discharge w/patient and caregiver  - Arrange for needed discharge resources and transportation as appropriate  - Identify discharge learning needs (meds, wound care, etc.)  - Arrange for interpretive services to assist at discharge as needed  - Refer to Case Management Department for coordinating discharge planning if the patient needs post-hospital services based on physician/advanced practitioner order or complex needs related to functional status, cognitive ability, or social support system  Outcome: Progressing     Problem: Knowledge Deficit  Goal: Patient/family/caregiver demonstrates understanding of disease process, treatment plan, medications, and discharge instructions  Description: Complete learning assessment and assess knowledge base.  Interventions:  - Provide teaching at level of understanding  - Provide teaching via preferred learning methods  Outcome: Progressing     Problem: Nutrition/Hydration-ADULT  Goal: Nutrient/Hydration intake appropriate for improving, restoring or maintaining nutritional needs  Description: Monitor and assess patient's nutrition/hydration status for malnutrition. Collaborate with interdisciplinary team and initiate plan and  interventions as ordered.  Monitor patient's weight and dietary intake as ordered or per policy. Utilize nutrition screening tool and intervene as necessary. Determine patient's food preferences and provide high-protein, high-caloric foods as appropriate.     INTERVENTIONS:  - Monitor oral intake, urinary output, labs, and treatment plans  - Assess nutrition and hydration status and recommend course of action  - Evaluate amount of meals eaten  - Assist patient with eating if necessary   - Allow adequate time for meals  - Recommend/ encourage appropriate diets, oral nutritional supplements, and vitamin/mineral supplements  - Order, calculate, and assess calorie counts as needed  - Recommend, monitor, and adjust tube feedings and TPN/PPN based on assessed needs  - Assess need for intravenous fluids  - Provide specific nutrition/hydration education as appropriate  - Include patient/family/caregiver in decisions related to nutrition  Outcome: Progressing

## 2024-01-15 NOTE — PLAN OF CARE
Problem: PHYSICAL THERAPY ADULT  Goal: Performs mobility at highest level of function for planned discharge setting.  See evaluation for individualized goals.  Description: Treatment/Interventions: Functional transfer training, LE strengthening/ROM, Therapeutic exercise, Endurance training, Cognitive reorientation, Patient/family training, Bed mobility, Gait training, Equipment eval/education, Elevations, Spoke to nursing, Spoke to case management, OT, Spoke to MD  Equipment Recommended: Walker (wide)       See flowsheet documentation for full assessment, interventions and recommendations.  Outcome: Progressing  Note: Prognosis: Fair  Problem List: Decreased strength, Decreased range of motion, Decreased endurance, Impaired balance, Decreased mobility, Decreased skin integrity, Orthopedic restrictions, Pain  Assessment: pt  began tx session lying supine in the bed and was agreeable to participate in PT intervention. PtT intervention consisted of TA for facilitation of transitional movements in order to increase safety with bed mobility, functional transfers, posture/balance w/ ambulation and stair trials. Progress was noted this tx session as pt was able to perorm bed mobility, transfers to and from RW and ambulation with RW with close /s and no LOB. pt does remain limited with activity tolerance and ambulation distance as pt ambulated 40'x1 RW w/o LOB but required a therapeutic seated rest break post ambulation. pt eduvcated on stair trials prior to initiating steps with verabl/visual demonstration. pt completd 3 steps with bilateral hand rails and close /s but required min Ax1 when attempting to do steps with only R sided hand rail in order to simulate home environment. Post tx session pt in recliner with call bell and all pt needs met. pt would benefit from cotninued skilled PT intervention in order to increase activity tolerance, ambulation distance and steps completed with less assistance required  Barriers  to Discharge: (S) Decreased caregiver support, Inaccessible home environment (jordan as both sets of stairs to enter do NOT have railing per pt--Pt reports intermittently going up via ahnds and feet crawl in the past)     Rehab Resource Intensity Level, PT: II (Moderate Resource Intensity)    See flowsheet documentation for full assessment.

## 2024-01-15 NOTE — PLAN OF CARE
Problem: Potential for Falls  Goal: Patient will remain free of falls  Description: INTERVENTIONS:  - Educate patient/family on patient safety including physical limitations  - Instruct patient to call for assistance with activity   - Consult OT/PT to assist with strengthening/mobility   - Keep Call bell within reach  - Keep bed low and locked with side rails adjusted as appropriate  - Keep care items and personal belongings within reach  - Initiate and maintain comfort rounds  - Make Fall Risk Sign visible to staff  - Apply yellow socks and bracelet for high fall risk patients  - Consider moving patient to room near nurses station  Outcome: Progressing     Problem: PAIN - ADULT  Goal: Verbalizes/displays adequate comfort level or baseline comfort level  Description: Interventions:  - Encourage patient to monitor pain and request assistance  - Assess pain using appropriate pain scale  - Administer analgesics based on type and severity of pain and evaluate response  - Implement non-pharmacological measures as appropriate and evaluate response  - Consider cultural and social influences on pain and pain management  - Notify physician/advanced practitioner if interventions unsuccessful or patient reports new pain  Outcome: Progressing     Problem: INFECTION - ADULT  Goal: Absence or prevention of progression during hospitalization  Description: INTERVENTIONS:  - Assess and monitor for signs and symptoms of infection  - Monitor lab/diagnostic results  - Monitor all insertion sites, i.e. indwelling lines, tubes, and drains  - Monitor endotracheal if appropriate and nasal secretions for changes in amount and color  - [place] appropriate cooling/warming therapies per order  - Administer medications as ordered  - Instruct and encourage patient and family to use good hand hygiene technique  - Identify and instruct in appropriate isolation precautions for identified infection/condition  Outcome: Progressing  Goal: Absence  of fever/infection during neutropenic period  Description: INTERVENTIONS:  - Monitor WBC    Outcome: Progressing     Problem: SAFETY ADULT  Goal: Patient will remain free of falls  Description: INTERVENTIONS:  - Educate patient/family on patient safety including physical limitations  - Instruct patient to call for assistance with activity   - Consult OT/PT to assist with strengthening/mobility   - Keep Call bell within reach  - Keep bed low and locked with side rails adjusted as appropriate  - Keep care items and personal belongings within reach  - Initiate and maintain comfort rounds  - Make Fall Risk Sign visible to staff  - Apply yellow socks and bracelet for high fall risk patients  - Consider moving patient to room near nurses station  Outcome: Progressing  Goal: Maintain or return to baseline ADL function  Description: INTERVENTIONS:  -  Assess patient's ability to carry out ADLs; assess patient's baseline for ADL function and identify physical deficits which impact ability to perform ADLs (bathing, care of mouth/teeth, toileting, grooming, dressing, etc.)  - Assess/evaluate cause of self-care deficits   - Assess range of motion  - Assess patient's mobility; develop plan if impaired  - Assess patient's need for assistive devices and provide as appropriate  - Encourage maximum independence but intervene and supervise when necessary  - Involve family in performance of ADLs  - Assess for home care needs following discharge   - Consider OT consult to assist with ADL evaluation and planning for discharge  - Provide patient education as appropriate  Outcome: Progressing  Goal: Maintains/Returns to pre admission functional level  Description: INTERVENTIONS:  - Perform AM-PAC 6 Click Basic Mobility/ Daily Activity assessment daily.  - Set and communicate daily mobility goal to care team and patient/family/caregiver.   - Out of bed for toileting  - Record patient progress and toleration of activity level   Outcome:  Progressing     Problem: DISCHARGE PLANNING  Goal: Discharge to home or other facility with appropriate resources  Description: INTERVENTIONS:  - Identify barriers to discharge w/patient and caregiver  - Arrange for needed discharge resources and transportation as appropriate  - Identify discharge learning needs (meds, wound care, etc.)  - Arrange for interpretive services to assist at discharge as needed  - Refer to Case Management Department for coordinating discharge planning if the patient needs post-hospital services based on physician/advanced practitioner order or complex needs related to functional status, cognitive ability, or social support system  Outcome: Progressing     Problem: Knowledge Deficit  Goal: Patient/family/caregiver demonstrates understanding of disease process, treatment plan, medications, and discharge instructions  Description: Complete learning assessment and assess knowledge base.  Interventions:  - Provide teaching at level of understanding  - Provide teaching via preferred learning methods  Outcome: Progressing     Problem: Nutrition/Hydration-ADULT  Goal: Nutrient/Hydration intake appropriate for improving, restoring or maintaining nutritional needs  Description: Monitor and assess patient's nutrition/hydration status for malnutrition. Collaborate with interdisciplinary team and initiate plan and interventions as ordered.  Monitor patient's weight and dietary intake as ordered or per policy. Utilize nutrition screening tool and intervene as necessary. Determine patient's food preferences and provide high-protein, high-caloric foods as appropriate.     INTERVENTIONS:  - Monitor oral intake, urinary output, labs, and treatment plans  - Assess nutrition and hydration status and recommend course of action  - Evaluate amount of meals eaten  - Assist patient with eating if necessary   - Allow adequate time for meals  - Recommend/ encourage appropriate diets, oral nutritional supplements,  and vitamin/mineral supplements  - Order, calculate, and assess calorie counts as needed  - Recommend, monitor, and adjust tube feedings and TPN/PPN based on assessed needs  - Assess need for intravenous fluids  - Provide specific nutrition/hydration education as appropriate  - Include patient/family/caregiver in decisions related to nutrition  Outcome: Progressing

## 2024-01-16 VITALS
OXYGEN SATURATION: 92 % | SYSTOLIC BLOOD PRESSURE: 130 MMHG | BODY MASS INDEX: 40.83 KG/M2 | WEIGHT: 291.67 LBS | DIASTOLIC BLOOD PRESSURE: 90 MMHG | TEMPERATURE: 98.1 F | HEART RATE: 89 BPM | RESPIRATION RATE: 16 BRPM | HEIGHT: 71 IN

## 2024-01-16 DIAGNOSIS — I10 ESSENTIAL HYPERTENSION: ICD-10-CM

## 2024-01-16 PROCEDURE — 99024 POSTOP FOLLOW-UP VISIT: CPT | Performed by: PHYSICIAN ASSISTANT

## 2024-01-16 PROCEDURE — 99238 HOSP IP/OBS DSCHRG MGMT 30/<: CPT

## 2024-01-16 RX ORDER — ASPIRIN 325 MG
325 TABLET ORAL 2 TIMES DAILY
Qty: 84 TABLET | Refills: 0 | Status: SHIPPED | OUTPATIENT
Start: 2024-01-16 | End: 2024-02-27

## 2024-01-16 RX ORDER — ACETAMINOPHEN 325 MG/1
650 TABLET ORAL EVERY 6 HOURS SCHEDULED
Start: 2024-01-16

## 2024-01-16 RX ORDER — OLMESARTAN MEDOXOMIL AND HYDROCHLOROTHIAZIDE 20/12.5 20; 12.5 MG/1; MG/1
1 TABLET ORAL DAILY
Qty: 100 TABLET | Refills: 2 | Status: SHIPPED | OUTPATIENT
Start: 2024-01-16

## 2024-01-16 RX ORDER — AMLODIPINE BESYLATE 5 MG/1
TABLET ORAL
Qty: 100 TABLET | Refills: 2 | Status: SHIPPED | OUTPATIENT
Start: 2024-01-16

## 2024-01-16 RX ADMIN — AMLODIPINE BESYLATE 5 MG: 5 TABLET ORAL at 10:00

## 2024-01-16 RX ADMIN — HYDROCHLOROTHIAZIDE 12.5 MG: 12.5 TABLET ORAL at 09:57

## 2024-01-16 RX ADMIN — ENOXAPARIN SODIUM 30 MG: 30 INJECTION SUBCUTANEOUS at 06:03

## 2024-01-16 RX ADMIN — LOSARTAN POTASSIUM 50 MG: 50 TABLET, FILM COATED ORAL at 10:00

## 2024-01-16 NOTE — PROGRESS NOTES
"Progress Note - Orthopedics   Kemal Huddleston 68 y.o. male MRN: [id number]  Unit/Bed#: W -01      Subjective:    68 y.o.male patient seen and evaluated at bedside. No complaints of pain. Eager for discharge today.     Labs:  0   Lab Value Date/Time    HCT 38.1 01/13/2024 0543    HCT 39.8 01/12/2024 0518    HCT 41.4 01/11/2024 2027    HGB 12.6 01/13/2024 0543    HGB 13.0 01/12/2024 0518    HGB 13.6 01/11/2024 2027    INR 0.98 01/10/2024 1347    WBC 8.02 01/13/2024 0543    WBC 9.37 01/12/2024 0518    WBC 8.97 01/11/2024 0633       Meds:    Current Facility-Administered Medications:     acetaminophen (TYLENOL) tablet 650 mg, 650 mg, Oral, Q6H MARYJO, Andrae Mary PA-C, 650 mg at 01/13/24 1237    amLODIPine (NORVASC) tablet 5 mg, 5 mg, Oral, Daily, Navya Blair MD, 5 mg at 01/15/24 0910    enoxaparin (LOVENOX) subcutaneous injection 30 mg, 30 mg, Subcutaneous, Q12H, Andrae Mary PA-C, 30 mg at 01/16/24 0603    losartan (COZAAR) tablet 50 mg, 50 mg, Oral, Daily, 50 mg at 01/15/24 0910 **AND** hydrochlorothiazide (HYDRODIURIL) tablet 12.5 mg, 12.5 mg, Oral, Daily, Navya Blair MD, 12.5 mg at 01/15/24 0910    HYDROmorphone HCl (DILAUDID) injection 0.2 mg, 0.2 mg, Intravenous, Q2H PRN, Andrae Mary PA-C    labetalol (NORMODYNE) injection 10 mg, 10 mg, Intravenous, Q15 Min PRN, Andrae Mary PA-C    naloxone (NARCAN) 0.04 mg/mL syringe 0.04 mg, 0.04 mg, Intravenous, Q1MIN PRN, Andrae Mary PA-C    ondansetron (ZOFRAN) injection 4 mg, 4 mg, Intravenous, Q4H PRN, Andrae Mary PA-C    oxyCODONE (ROXICODONE) split tablet 2.5 mg, 2.5 mg, Oral, Q4H PRN **OR** oxyCODONE (ROXICODONE) IR tablet 5 mg, 5 mg, Oral, Q4H PRN, Andrae Mary PA-C, 5 mg at 01/10/24 2155    senna-docusate sodium (SENOKOT S) 8.6-50 mg per tablet 1 tablet, 1 tablet, Oral, HS, Andrae Mary PA-C    Blood Culture:   No results found for: \"BLOODCX\"    Wound Culture:   No results found for: \"WOUNDCULT\"    Ins and Outs:  I/O last 24 hours:  In: 680 " [P.O.:680]  Out: 1450 [Urine:1450]          Physical:  Vitals:    01/16/24 0840   BP: 105/65   Pulse: 88   Resp: 14   Temp: 97.9 °F (36.6 °C)   SpO2: 91%     Musculoskeletal: right Lower Extremity  Surgical dressings c/d/I without strikethrough  Some surrounding bruising, expected.   Thigh and calf soft and compressible.   Sensation intact to saphenous, sural, tibial, superficial peroneal nerve, and deep peroneal  Motor intact to +FHL/EHL, +ankle dorsi/plantar flexion  2+ DP pulse  Digits warm and well perfused  Capillary refill < 2 seconds    Assessment:    68 y.o.male s/p right long TFN with Dr. Vaca 1/11/2024 (POD 5).     Plan:  WBAT to the right lower extremity.   Will monitor for ABLA and administer IVF/prbc as indicated for Greater than 2 gram drop or Hgb < 7, defer to primary team.   PT/OT  Pain control per primary team.   DVT ppx: recommend ASA 325mg BID upon discharge.   Medical management per primary team.   Dispo: Ortho will follow  Should follow up with Dr Wilson upon discharge.     Cyndi Duong PA-C

## 2024-01-16 NOTE — CASE MANAGEMENT
Case Management Discharge Planning Note    Patient name Kemal Huddleston  Location W /W -01 MRN [id number]  : 1955 Date 2024       Current Admission Date: 1/10/2024  Current Admission Diagnosis:Nondisplaced intertrochanteric fracture of right femur, initial encounter for closed fracture (HCC)   Patient Active Problem List    Diagnosis Date Noted    Fall 01/10/2024    Nondisplaced intertrochanteric fracture of right femur, initial encounter for closed fracture (HCC) 01/10/2024    Diverticulosis 2023    Annual physical exam 2021    Prostate cancer screening 07/10/2020    HTN (hypertension) 07/10/2020    Class 2 severe obesity due to excess calories with serious comorbidity and body mass index (BMI) of 39.0 to 39.9 in adult  07/10/2020      LOS (days): 6  Geometric Mean LOS (GMLOS) (days): 2.8  Days to GMLOS:-3.1     OBJECTIVE:  Risk of Unplanned Readmission Score: 7.11         Current admission status: Inpatient   Preferred Pharmacy:   [facility] - [place] [address] [facility]  [address] [address]  Phone: [phone] Fax: [phone]    [facility] - [place], [place] - [address]  [address]  [facility] [address]  [facility] [address]  Phone: [phone] Fax: [phone]    [facility] - [place] - [address]  [address] [place] [address]  Phone: [phone] Fax: [phone]    Primary Care Provider: Heike Nelson MD    [facility]  Secondary Insurance:     DISCHARGE DETAILS:    Discharge planning discussed with:: patient at bedside  Freedom of Choice: Yes  Comments - Freedom of Choice: [facility]    Requested Home Health Care         Is the patient interested in HHC at discharge?: Yes  Home Health Discipline requested:: Occupational Therapy, Physical Therapy, Nursing  Home Health Agency Name:: [facility]  Health Follow-Up Provider:: PCP  Home Health Services Needed:: Gait/ADL Training, Evaluate Functional Status and Safety, Strengthening/Theraputic Exercises to Improve Function  Homebound Criteria Met:: Requires the Assistance of Another Person for Safe Ambulation or to Leave the Home, Uses an Assist Device (i.e. cane, walker, etc)  Supporting Clincal Findings:: Limited Endurance, Fatigues Easliy in Short Distances    DME Referral Provided  Referral made for DME?: Yes  DME referral completed for the following items:: Bedside Commode, Walker  DME Supplier Name:: [facility]    Other Referral/Resources/Interventions Provided:  Interventions: C  Referral Comments: Patient to d/c home today with [facility] and DME. Walker and BSC delivered to bedside. No further CM needs anticipated at this time.    Would you like to participate in our [facility] service program?  : No - Declined    Treatment Team Recommendation: Home with Home Health Care  Discharge Destination Plan:: Home with Home Health Care  Transport at Discharge : Family    IMM Given (Date):: 01/16/24  IMM Given to:: Patient (copy provided)  IMM reviewed with patient, patient agrees with discharge determination.

## 2024-01-16 NOTE — PLAN OF CARE
Problem: Potential for Falls  Goal: Patient will remain free of falls  Description: INTERVENTIONS:  - Educate patient/family on patient safety including physical limitations  - Instruct patient to call for assistance with activity   - Consult OT/PT to assist with strengthening/mobility   - Keep Call bell within reach  - Keep bed low and locked with side rails adjusted as appropriate  - Keep care items and personal belongings within reach  - Initiate and maintain comfort rounds  - Make Fall Risk Sign visible to staff  - Offer Toileting every 2 Hours, in advance of need  - Initiate/Maintain alarm  - Obtain necessary fall risk management equipment  - Apply yellow socks and bracelet for high fall risk patients  - Consider moving patient to room near nurses station  1/16/2024 1348 by Iris Garvin  Outcome: Adequate for Discharge  1/16/2024 0748 by Iris Garvin  Outcome: Progressing     Problem: PAIN - ADULT  Goal: Verbalizes/displays adequate comfort level or baseline comfort level  Description: Interventions:  - Encourage patient to monitor pain and request assistance  - Assess pain using appropriate pain scale  - Administer analgesics based on type and severity of pain and evaluate response  - Implement non-pharmacological measures as appropriate and evaluate response  - Consider cultural and social influences on pain and pain management  - Notify physician/advanced practitioner if interventions unsuccessful or patient reports new pain  1/16/2024 1348 by Iris Garvin  Outcome: Adequate for Discharge  1/16/2024 0748 by Iris Garvin  Outcome: Progressing     Problem: INFECTION - ADULT  Goal: Absence or prevention of progression during hospitalization  Description: INTERVENTIONS:  - Assess and monitor for signs and symptoms of infection  - Monitor lab/diagnostic results  - Monitor all insertion sites, i.e. indwelling lines, tubes, and drains  - Monitor endotracheal if appropriate and nasal secretions for changes in amount  and color  - [place] appropriate cooling/warming therapies per order  - Administer medications as ordered  - Instruct and encourage patient and family to use good hand hygiene technique  - Identify and instruct in appropriate isolation precautions for identified infection/condition  1/16/2024 1348 by Iris Garvin  Outcome: Adequate for Discharge  1/16/2024 0748 by Iris Garvin  Outcome: Progressing  Goal: Absence of fever/infection during neutropenic period  Description: INTERVENTIONS:  - Monitor WBC    1/16/2024 1348 by Iris Garvin  Outcome: Adequate for Discharge  1/16/2024 0748 by Iris Garvin  Outcome: Progressing     Problem: SAFETY ADULT  Goal: Patient will remain free of falls  Description: INTERVENTIONS:  - Educate patient/family on patient safety including physical limitations  - Instruct patient to call for assistance with activity   - Consult OT/PT to assist with strengthening/mobility   - Keep Call bell within reach  - Keep bed low and locked with side rails adjusted as appropriate  - Keep care items and personal belongings within reach  - Initiate and maintain comfort rounds  - Make Fall Risk Sign visible to staff  - Offer Toileting every 2 Hours, in advance of need  - Initiate/Maintain alarm  - Obtain necessary fall risk management equipment  - Apply yellow socks and bracelet for high fall risk patients  - Consider moving patient to room near nurses station  1/16/2024 1348 by Iris Garvin  Outcome: Adequate for Discharge  1/16/2024 0748 by Iris Garvin  Outcome: Progressing  Goal: Maintain or return to baseline ADL function  Description: INTERVENTIONS:  -  Assess patient's ability to carry out ADLs; assess patient's baseline for ADL function and identify physical deficits which impact ability to perform ADLs (bathing, care of mouth/teeth, toileting, grooming, dressing, etc.)  - Assess/evaluate cause of self-care deficits   - Assess range of motion  - Assess patient's mobility; develop plan if impaired  -  Assess patient's need for assistive devices and provide as appropriate  - Encourage maximum independence but intervene and supervise when necessary  - Involve family in performance of ADLs  - Assess for home care needs following discharge   - Consider OT consult to assist with ADL evaluation and planning for discharge  - Provide patient education as appropriate  1/16/2024 1348 by Iris Garvin  Outcome: Adequate for Discharge  1/16/2024 0748 by Iris Garvin  Outcome: Progressing  Goal: Maintains/Returns to pre admission functional level  Description: INTERVENTIONS:  - Perform AM-PAC 6 Click Basic Mobility/ Daily Activity assessment daily.  - Set and communicate daily mobility goal to care team and patient/family/caregiver.   - Collaborate with rehabilitation services on mobility goals if consulted  - Perform Range of Motion 3 times a day.  - Reposition patient every 2 hours.  - Dangle patient 3 times a day  - Stand patient 3 times a day  - Ambulate patient 3 times a day  - Out of bed to chair 3 times a day   - Out of bed for meals 3 times a day  - Out of bed for toileting  - Record patient progress and toleration of activity level   1/16/2024 1348 by Iris Garvin  Outcome: Adequate for Discharge  1/16/2024 0748 by Iris Garvin  Outcome: Progressing     Problem: DISCHARGE PLANNING  Goal: Discharge to home or other facility with appropriate resources  Description: INTERVENTIONS:  - Identify barriers to discharge w/patient and caregiver  - Arrange for needed discharge resources and transportation as appropriate  - Identify discharge learning needs (meds, wound care, etc.)  - Arrange for interpretive services to assist at discharge as needed  - Refer to Case Management Department for coordinating discharge planning if the patient needs post-hospital services based on physician/advanced practitioner order or complex needs related to functional status, cognitive ability, or social support system  1/16/2024 1348 by Iris  Bharathi  Outcome: Adequate for Discharge  1/16/2024 0748 by Iris Garvin  Outcome: Progressing     Problem: Knowledge Deficit  Goal: Patient/family/caregiver demonstrates understanding of disease process, treatment plan, medications, and discharge instructions  Description: Complete learning assessment and assess knowledge base.  Interventions:  - Provide teaching at level of understanding  - Provide teaching via preferred learning methods  1/16/2024 1348 by Iris Garvin  Outcome: Adequate for Discharge  1/16/2024 0748 by Iris Garvin  Outcome: Progressing     Problem: Nutrition/Hydration-ADULT  Goal: Nutrient/Hydration intake appropriate for improving, restoring or maintaining nutritional needs  Description: Monitor and assess patient's nutrition/hydration status for malnutrition. Collaborate with interdisciplinary team and initiate plan and interventions as ordered.  Monitor patient's weight and dietary intake as ordered or per policy. Utilize nutrition screening tool and intervene as necessary. Determine patient's food preferences and provide high-protein, high-caloric foods as appropriate.     INTERVENTIONS:  - Monitor oral intake, urinary output, labs, and treatment plans  - Assess nutrition and hydration status and recommend course of action  - Evaluate amount of meals eaten  - Assist patient with eating if necessary   - Allow adequate time for meals  - Recommend/ encourage appropriate diets, oral nutritional supplements, and vitamin/mineral supplements  - Order, calculate, and assess calorie counts as needed  - Recommend, monitor, and adjust tube feedings and TPN/PPN based on assessed needs  - Assess need for intravenous fluids  - Provide specific nutrition/hydration education as appropriate  - Include patient/family/caregiver in decisions related to nutrition  1/16/2024 1348 by Iris Garvin  Outcome: Adequate for Discharge  1/16/2024 0748 by Iris Garvin  Outcome: Progressing

## 2024-01-16 NOTE — DISCHARGE SUMMARY
Formerly [facility]  Discharge- Kemal Huddleston 1955, 68 y.o. male MRN: [id number]  Unit/Bed#: W -01 Encounter: [id number]  Primary Care Provider: Heike Nelson MD   Date and time admitted to hospital: 1/10/2024 11:47 AM    Fall  Assessment & Plan  - Status post mechanical fall with the below noted injuries.  - Fall precautions.  - PT and OT evaluation and treatment as indicated.  - Case Management consultation for disposition planning.    HTN (hypertension)  Assessment & Plan  - Patient with chronic history of hypertension.  - Continue current medication regimen.  - Patient with continued use of diuretic and ARB therapy despite recent operative intervention with no evidence of JENIFFER at this time.  - Resume home medication regimen on discharge as appropriate.  - Outpatient follow-up routine.      * Nondisplaced intertrochanteric fracture of right femur, initial encounter for closed fracture (HCC)  Assessment & Plan  - Nondisplaced right intertrochanteric femur fracture, present on admission.  - Appreciate Orthopedic surgery evaluation, recommendations and interventions as noted.   - TXA administered in the ED.   - Patient status post ORIF of right femur fracture with IM nail insertion on 1/11/2024.  - May be weightbearing as tolerated on the right lower extremity postoperatively.  - Monitor right lower extremity neurovascular exam.  - Continue multimodal analgesic regimen.  - Continue DVT prophylaxis.  - PT and OT evaluation and treatment as indicated.  - Outpatient follow up with Orthopedic surgery for re-evaluation.      Bowel Regimen: Senokot S  VTE Prophylaxis:Enoxaparin (Lovenox)     Disposition: Medically stable for discharge home with home health care.    Subjective   Chief Complaint: No new complaints    Subjective: Patient states she is doing well this morning and offers no new complaints.  His pain is well-controlled at this time.  He is ambulating with use of a walker.  He is  "tolerating a diet and denies abdominal pain, nausea, vomiting.  Reports bowel movement this morning.  He is motivated for discharge home.     Objective   Vitals:   Temp:  [97.7 °F (36.5 °C)-99.4 °F (37.4 °C)] 98.6 °F (37 °C)  HR:  [82-93] 91  Resp:  [16-20] 16  BP: (119-142)/(69-92) 126/75    I/O         01/14 0701  01/15 0700 01/15 0701  01/16 0700 01/16 0701  01/17 0700    P.O. 960 680     Total Intake(mL/kg) 960 (7.4) 680 (5.2)     Urine (mL/kg/hr)  1450 (0.5)     Stool  0     Total Output  1450     Net +960 -770            Unmeasured Urine Occurrence 5 x 1 x     Unmeasured Stool Occurrence 2 x 1 x              Physical Exam:   GENERAL APPEARANCE: Patient in no acute distress.  HEENT: NCAT; PERRL, EOMs intact; Mucous membranes moist  NECK / BACK: ROM normal  CV: Regular rate and rhythm; no murmur/gallops/rubs appreciated.  CHEST / LUNGS: Clear to auscultation; no wheezes/rales/rhonci.  ABD: NABS; soft; non-distended; non-tender.  : Voiding  EXT: RLE surgical dressings clean, dry, intact; RLE NVI; +2 pulses bilaterally upper & lower extremities; no edema.  NEURO: GCS 15; no focal neurologic deficits; neurovascularly intact.  SKIN: Warm, dry and well perfused; no rash; no jaundice.      Invasive Devices       Peripheral Intravenous Line  Duration             Peripheral IV 01/10/24 Right Antecubital 5 days                          Lab Results: Results: I have personally reviewed all pertinent laboratory/tests results  Imaging: I have personally reviewed pertinent reports.     Other Studies: None           Medical Problems       Resolved Problems  Date Reviewed: 1/16/2024   None         Admission Date:   Admission Orders (From admission, onward)       Ordered        01/10/24 1340  Inpatient Admission  Once                            Admitting Diagnosis: Right hip pain [M25.551]  Right knee pain [M25.561]  Right femoral fracture (HCC) [S72.91XA]    HPI: Documented by Navya Blair MD on 1/10/2024, \"Kemal Huddleston " "is a 68 y.o. male with hypertension presenting the ED for right hip pain after mechanical fall.  Patient states he was walking outside his house today when he tripped on his own feet, landed directly on his right hip causing pain and was unable to get up on his own until EMS arrived approximately 15 minutes later.  Reports mild right hip pain, otherwise feels well.  No anticoagulants or aspirin use.  No head strike or LOC.  Denies chest pain, shortness of breath, abdominal pain, urinary symptoms, URI symptoms, fever, chills, weakness, paresthesias, slurred speech, dizziness.\"    Procedures Performed: No orders of the defined types were placed in this encounter.      Summary of Hospital Course: Patient was seen and evaluated by the trauma team and admitted with findings of a right intertrochanteric femur fracture.  Orthopedic surgery was consulted to manage patient operatively.  He was taken for an ORIF of the right femur with an IM nail on 1/11/2024.  Postoperatively, patient's hemoglobin was monitored and his pain was well-managed.  PT/OT evaluated patient recommended discharge home with home health care.  Patient was stable for discharge on 1/16/2024.  He will follow-up outpatient with orthopedic surgery in approximately 2 weeks.  For further details on hospitalization, please reference hospital medical chart.    Significant Findings, Care, Treatment and Services Provided: Right intertrochanteric femur fracture    Complications: None    Condition at Discharge: good         Discharge instructions/Information to patient and family:   See after visit summary for information provided to patient and family.      Provisions for Follow-Up Care:  See after visit summary for information related to follow-up care and any pertinent home health orders.      PCP: Heike Nelson MD    Disposition: Home     Planned Readmission: No    Discharge Statement   I spent 29 minutes discharging the patient. This time was spent on the day " of discharge. I had direct contact with the patient on the day of discharge. Additional documentation is required if more than 30 minutes were spent on discharge.     Discharge Medications:  See after visit summary for reconciled discharge medications provided to patient and family.

## 2024-01-17 ENCOUNTER — HOME CARE VISIT (OUTPATIENT)
Dept: HOME HEALTH SERVICES | Facility: HOME HEALTHCARE | Age: 69
End: 2024-01-17

## 2024-01-17 NOTE — CASE COMMUNICATION
I spoke to the patient regarding home physical and occupational therapy. Explained that the therapist will call to schedule the visit. Patient has a doctor's appointment on 1/22/24 in the afternoon.    Agreeable to  services and no other SN agencies in home: X    Communication to the physician regarding delay:     Insurance, copays, HB status reviewed: X-Patient is ambulating with a walker. Patient has 2 MICHAEL a 2 SH. Patient currently  has a first floor set up. Patient has a half bath on the first floor.    Verified address and phone number: X    Requested that patient secure pets: X-no pets    Medication bottles and DC instructions in home: X    Wound care/IV supplies in home:     CG present to learn:     Other concerns patient/family would like to address at visit:

## 2024-01-18 ENCOUNTER — TRANSITIONAL CARE MANAGEMENT (OUTPATIENT)
Dept: FAMILY MEDICINE CLINIC | Facility: CLINIC | Age: 69
End: 2024-01-18

## 2024-01-18 ENCOUNTER — HOME CARE VISIT (OUTPATIENT)
Dept: HOME HEALTH SERVICES | Facility: HOME HEALTHCARE | Age: 69
End: 2024-01-18
Payer: COMMERCIAL

## 2024-01-18 VITALS — DIASTOLIC BLOOD PRESSURE: 80 MMHG | HEART RATE: 69 BPM | OXYGEN SATURATION: 95 % | SYSTOLIC BLOOD PRESSURE: 115 MMHG

## 2024-01-18 LAB
DME PARACHUTE DELIVERY DATE ACTUAL: NORMAL
DME PARACHUTE DELIVERY DATE REQUESTED: NORMAL
DME PARACHUTE ITEM DESCRIPTION: NORMAL
DME PARACHUTE ITEM DESCRIPTION: NORMAL
DME PARACHUTE ORDER STATUS: NORMAL
DME PARACHUTE SUPPLIER NAME: NORMAL
DME PARACHUTE SUPPLIER PHONE: NORMAL

## 2024-01-18 PROCEDURE — 400013 VN SOC

## 2024-01-18 PROCEDURE — G0152 HHCP-SERV OF OT,EA 15 MIN: HCPCS

## 2024-01-19 ENCOUNTER — HOME CARE VISIT (OUTPATIENT)
Dept: HOME HEALTH SERVICES | Facility: HOME HEALTHCARE | Age: 69
End: 2024-01-19
Payer: COMMERCIAL

## 2024-01-19 VITALS — SYSTOLIC BLOOD PRESSURE: 108 MMHG | OXYGEN SATURATION: 96 % | DIASTOLIC BLOOD PRESSURE: 64 MMHG | HEART RATE: 80 BPM

## 2024-01-19 PROCEDURE — G0151 HHCP-SERV OF PT,EA 15 MIN: HCPCS

## 2024-01-22 ENCOUNTER — OFFICE VISIT (OUTPATIENT)
Dept: OBGYN CLINIC | Facility: CLINIC | Age: 69
End: 2024-01-22

## 2024-01-22 ENCOUNTER — APPOINTMENT (OUTPATIENT)
Dept: RADIOLOGY | Facility: AMBULARY SURGERY CENTER | Age: 69
End: 2024-01-22
Attending: STUDENT IN AN ORGANIZED HEALTH CARE EDUCATION/TRAINING PROGRAM
Payer: COMMERCIAL

## 2024-01-22 VITALS — WEIGHT: 291.67 LBS | HEIGHT: 71 IN | BODY MASS INDEX: 40.83 KG/M2

## 2024-01-22 DIAGNOSIS — S72.144A NONDISPLACED INTERTROCHANTERIC FRACTURE OF RIGHT FEMUR, INITIAL ENCOUNTER FOR CLOSED FRACTURE (HCC): Primary | ICD-10-CM

## 2024-01-22 DIAGNOSIS — S72.144A NONDISPLACED INTERTROCHANTERIC FRACTURE OF RIGHT FEMUR, INITIAL ENCOUNTER FOR CLOSED FRACTURE (HCC): ICD-10-CM

## 2024-01-22 PROCEDURE — 73552 X-RAY EXAM OF FEMUR 2/>: CPT

## 2024-01-22 PROCEDURE — 99024 POSTOP FOLLOW-UP VISIT: CPT | Performed by: STUDENT IN AN ORGANIZED HEALTH CARE EDUCATION/TRAINING PROGRAM

## 2024-01-22 NOTE — PROGRESS NOTES
Orthopaedic Surgery - Office Note  Kemal Huddleston (68 y.o. male)   : 1955   MRN: [id number]  Encounter Date: 2024    No chief complaint on file.    Past Surgical History:   Procedure Laterality Date    COLONOSCOPY  2015    AK OPTX FEM SHFT FX W/INSJ IMED IMPLT W/WO SCREW Right 2024    Procedure: INSERTION NAIL IM FEMUR ANTEGRADE (TROCHANTERIC), and all associated procedures;  Surgeon: Frandy Vaca DO;  Location: [facility];  Service: Orthopedics    TONSILLECTOMY      VASECTOMY       Assessment / Plan  S/p insertion right femoral intramedullary nail, DOS: 24  Right peritrochanteric femur fracture    X-rays reviewed and discussed with patient revealing: maintained alignment of patient's previous peritrochanteric fracture with no signs of hardware failure or loosening. No acute fracture or dislocation noted.   Patient to continue to be weightbearing as tolerated to the right lower extremity  Pt to continue ASA 325mg BID for DVT ppx for another 4 weeks  Pt instructed he may shower at this time, instructed not to submerge or scrub incisions  Range of motion as tolerated to right lower extremity  Patient to continue PT for strength and gait training  Pt may continue the use of a walker and work with PT to wean off the walker and transition to a cane   Patient to continue at home analgesic medication with Tylenol  Patient to follow-up in 6 weeks for repeat x-ray and reevaluation    History of Present Illness  Kemal Huddleston is a 68 y.o. male who presents 2 weeks s/p S/p insertion right femoral intramedullary nail, DOS: 24. He presents today using a walker to assist with ambulation. He states he is doing well and denies any significant pain of the right lower extremity. He takes Tylenol for pain relief. He states overall he is doing well. He continues to work with at home PT.  He denies any numbness, tingling or paresthesias.     Review of Systems  Pertinent items are noted in HPI.  All  other systems were reviewed and are negative.    Physical Exam  There were no vitals taken for this visit.  Cons: Appears well.  No apparent distress.  Psych: Alert. Oriented x3.  Mood and affect normal.  Eyes: PERRLA, EOMI  Resp: Normal effort.  No audible wheezing or stridor.  CV: Palpable pulse.  No discernable arrhythmia.    Lymph:  No palpable cervical, axillary, or inguinal lymphadenopathy.  Skin: Warm.  No palpable masses.  No visible lesions.  Neuro: Normal muscle tone.  Normal and symmetric DTR's.     Right lower extremity   The right  lower extremity was exposed and inspected. Surgical incisions are well healed without drainage or signs of dehiscence. Previous staples removed and steri-strips placed. All other visible skin intact without erythema, ecchymosis, effusion or obvious osseous deformity. Maria Del Rosario-incisional TTP. Pt able to range hip from 0-90 degrees of flexion, pt is very guarded during ROM testing. Sensation intact to superficial peroneal, deep peroneal, sural, saphenous, plantar nerve distributions. Motor intact to extensor hallux longus, tibialis anterior, gastrocnemius muscles, extensor mechanism intact. Limb is well perfused. Brisk capillary refill in all 5 digits. Compartments soft and compressible.     Studies Reviewed  X-rays right femur reveal maintained alignment of patient's previous peritrochanteric fracture with no signs of hardware failure or loosening. No acute fracture or dislocation noted.       Procedures      Medical, Surgical, Family, and Social History  The patient's medical history, family history, and social history, were reviewed and updated as appropriate.    Past Medical History:   Diagnosis Date    Hypertension            Family History   Problem Relation Age of Onset    Cancer Mother     Hypertension Father        Social History     Occupational History    Not on file   Tobacco Use    Smoking status: Never    Smokeless tobacco: Never   Vaping Use    Vaping status: Never  Used   Substance and Sexual Activity    Alcohol use: Yes     Alcohol/week: 4.0 standard drinks of alcohol     Types: 1 Glasses of wine, 1 Cans of beer, 1 Shots of liquor, 1 Standard drinks or equivalent per week     Comment: occasional    Drug use: Never    Sexual activity: Not on file       No Known Allergies      Current Outpatient Medications:     acetaminophen (TYLENOL) 325 mg tablet, Take 2 tablets (650 mg total) by mouth every 6 (six) hours, Disp: , Rfl:     amLODIPine (NORVASC) 5 mg tablet, TAKE 1 TABLET BY MOUTH DAILY, Disp: 100 tablet, Rfl: 2    aspirin 325 mg tablet, Take 1 tablet (325 mg total) by mouth 2 (two) times a day, Disp: 84 tablet, Rfl: 0    olmesartan-hydrochlorothiazide (BENICAR HCT) 20-12.5 MG per tablet, TAKE 1 TABLET BY MOUTH DAILY, Disp: 100 tablet, Rfl: 2      Andrae Mary PA-C    Scribe Attestation      I,:   am acting as a scribe while in the presence of the attending physician.:       I,:   personally performed the services described in this documentation    as scribed in my presence.:

## 2024-01-23 ENCOUNTER — HOME CARE VISIT (OUTPATIENT)
Dept: HOME HEALTH SERVICES | Facility: HOME HEALTHCARE | Age: 69
End: 2024-01-23
Payer: COMMERCIAL

## 2024-01-23 VITALS — DIASTOLIC BLOOD PRESSURE: 68 MMHG | SYSTOLIC BLOOD PRESSURE: 122 MMHG

## 2024-01-23 PROCEDURE — G0151 HHCP-SERV OF PT,EA 15 MIN: HCPCS

## 2024-01-24 ENCOUNTER — HOME CARE VISIT (OUTPATIENT)
Dept: HOME HEALTH SERVICES | Facility: HOME HEALTHCARE | Age: 69
End: 2024-01-24
Payer: COMMERCIAL

## 2024-01-24 VITALS — OXYGEN SATURATION: 94 % | SYSTOLIC BLOOD PRESSURE: 110 MMHG | HEART RATE: 70 BPM | DIASTOLIC BLOOD PRESSURE: 70 MMHG

## 2024-01-24 PROCEDURE — G0180 MD CERTIFICATION HHA PATIENT: HCPCS | Performed by: ORTHOPAEDIC SURGERY

## 2024-01-24 PROCEDURE — G0152 HHCP-SERV OF OT,EA 15 MIN: HCPCS

## 2024-01-25 ENCOUNTER — HOME CARE VISIT (OUTPATIENT)
Dept: HOME HEALTH SERVICES | Facility: HOME HEALTHCARE | Age: 69
End: 2024-01-25
Payer: COMMERCIAL

## 2024-01-25 PROCEDURE — G0151 HHCP-SERV OF PT,EA 15 MIN: HCPCS

## 2024-01-26 ENCOUNTER — HOME CARE VISIT (OUTPATIENT)
Dept: HOME HEALTH SERVICES | Facility: HOME HEALTHCARE | Age: 69
End: 2024-01-26
Payer: COMMERCIAL

## 2024-01-26 PROCEDURE — G0158 HHC OT ASSISTANT EA 15: HCPCS

## 2024-01-29 ENCOUNTER — HOME CARE VISIT (OUTPATIENT)
Dept: HOME HEALTH SERVICES | Facility: HOME HEALTHCARE | Age: 69
End: 2024-01-29
Payer: COMMERCIAL

## 2024-01-29 VITALS — HEART RATE: 92 BPM | OXYGEN SATURATION: 96 % | DIASTOLIC BLOOD PRESSURE: 70 MMHG | SYSTOLIC BLOOD PRESSURE: 125 MMHG

## 2024-01-29 PROCEDURE — G0152 HHCP-SERV OF OT,EA 15 MIN: HCPCS

## 2024-01-30 ENCOUNTER — HOME CARE VISIT (OUTPATIENT)
Dept: HOME HEALTH SERVICES | Facility: HOME HEALTHCARE | Age: 69
End: 2024-01-30
Payer: COMMERCIAL

## 2024-01-30 VITALS — SYSTOLIC BLOOD PRESSURE: 120 MMHG | DIASTOLIC BLOOD PRESSURE: 68 MMHG

## 2024-01-30 PROCEDURE — G0151 HHCP-SERV OF PT,EA 15 MIN: HCPCS

## 2024-02-01 ENCOUNTER — HOME CARE VISIT (OUTPATIENT)
Dept: HOME HEALTH SERVICES | Facility: HOME HEALTHCARE | Age: 69
End: 2024-02-01
Payer: COMMERCIAL

## 2024-02-01 DIAGNOSIS — S72.144A NONDISPLACED INTERTROCHANTERIC FRACTURE OF RIGHT FEMUR, INITIAL ENCOUNTER FOR CLOSED FRACTURE (HCC): Primary | ICD-10-CM

## 2024-02-01 PROCEDURE — G0151 HHCP-SERV OF PT,EA 15 MIN: HCPCS

## 2024-02-02 ENCOUNTER — HOME CARE VISIT (OUTPATIENT)
Dept: HOME HEALTH SERVICES | Facility: HOME HEALTHCARE | Age: 69
End: 2024-02-02
Payer: COMMERCIAL

## 2024-02-02 VITALS — HEART RATE: 80 BPM

## 2024-02-02 VITALS — HEART RATE: 86 BPM | DIASTOLIC BLOOD PRESSURE: 60 MMHG | OXYGEN SATURATION: 94 % | SYSTOLIC BLOOD PRESSURE: 115 MMHG

## 2024-02-02 PROCEDURE — G0152 HHCP-SERV OF OT,EA 15 MIN: HCPCS

## 2024-02-06 ENCOUNTER — HOME CARE VISIT (OUTPATIENT)
Dept: HOME HEALTH SERVICES | Facility: HOME HEALTHCARE | Age: 69
End: 2024-02-06
Payer: COMMERCIAL

## 2024-02-06 VITALS — DIASTOLIC BLOOD PRESSURE: 82 MMHG | SYSTOLIC BLOOD PRESSURE: 130 MMHG

## 2024-02-06 PROCEDURE — G0151 HHCP-SERV OF PT,EA 15 MIN: HCPCS

## 2024-02-08 ENCOUNTER — HOME CARE VISIT (OUTPATIENT)
Dept: HOME HEALTH SERVICES | Facility: HOME HEALTHCARE | Age: 69
End: 2024-02-08
Payer: COMMERCIAL

## 2024-02-08 VITALS — DIASTOLIC BLOOD PRESSURE: 78 MMHG | SYSTOLIC BLOOD PRESSURE: 118 MMHG

## 2024-02-08 PROCEDURE — G0151 HHCP-SERV OF PT,EA 15 MIN: HCPCS

## 2024-02-12 ENCOUNTER — EVALUATION (OUTPATIENT)
Dept: PHYSICAL THERAPY | Facility: CLINIC | Age: 69
End: 2024-02-12
Payer: COMMERCIAL

## 2024-02-12 DIAGNOSIS — Z87.81 STATUS POST OPEN REDUCTION AND INTERNAL FIXATION (ORIF) OF FRACTURE: Primary | ICD-10-CM

## 2024-02-12 DIAGNOSIS — S72.144D NONDISPLACED INTERTROCHANTERIC FRACTURE OF RIGHT FEMUR, SUBSEQUENT ENCOUNTER FOR CLOSED FRACTURE WITH ROUTINE HEALING: ICD-10-CM

## 2024-02-12 DIAGNOSIS — Z98.890 STATUS POST OPEN REDUCTION AND INTERNAL FIXATION (ORIF) OF FRACTURE: Primary | ICD-10-CM

## 2024-02-12 PROCEDURE — 97162 PT EVAL MOD COMPLEX 30 MIN: CPT | Performed by: PHYSICAL THERAPIST

## 2024-02-12 PROCEDURE — 97530 THERAPEUTIC ACTIVITIES: CPT | Performed by: PHYSICAL THERAPIST

## 2024-02-12 NOTE — PROGRESS NOTES
PT Evaluation     Today's date: 2024  Patient name: Kemal Huddleston  : 1955  MRN: [id number]  Referring provider: Frandy Vaca DO  Dx:   Encounter Diagnosis     ICD-10-CM    1. Status post open reduction and internal fixation (ORIF) of fracture  Z98.890     Z87.81       2. Nondisplaced intertrochanteric fracture of right femur, subsequent encounter for closed fracture with routine healing  S72.144D Ambulatory Referral to Physical Therapy          Start Time: 1130  Stop Time: 1215  Total time in clinic (min): 45 minutes    Assessment  Assessment details: Patient is a 68 y.o. male who presents to outpatient PT with s/p Right trochanteric fracture with ORIF performed on 2024. Patient is about 1 month post-op at this time, ambulating with rolling walker for all distances. He exhibits decreased Right weight-shifting and decreased heel strike on Right foot, which impacts stride lengths and forward advancement of Left LE. Patient noted to have post-op hip and knee ROM and strength deficits on Right LE, which affect his walking, transfer, and stair negotiation mechanics. It also impacts his mobility with getting shoes/socks on transfers in/out of bed or car. Patient currently has soreness in lateral Right hip, with severe distal quad pain noted with initiation of hip flexion in supine or seated position and with hip flexor/quad stretching in supine position. His current post-op limitations and impairments affect his mechanics and independence with ADLs and self-care activities. He lives with his wife who assists him with ADLs as needed and driving. Patient will benefit from skilled PT services to improve pain levels, improve LE mobility and LE strength, improve balance, and improve functional mobility for overall ease with ADLs to progress him towards his PLOF to be independent with daily functional tasks. Patient would benefit from skilled PT services to address these impairments and to maximize  function.  Thank you for the referral.    Impairments: abnormal gait, abnormal or restricted ROM, activity intolerance, impaired balance, impaired physical strength, lacks appropriate home exercise program, pain with function, weight-bearing intolerance, poor posture  and poor body mechanics  Functional limitations: walking, bed mobility, stair negotiation, getting in/out of car, getting shoes/socks on, not drivingUnderstanding of Dx/Px/POC: good   Prognosis: good    Goals  Impairment Goals 4-6 weeks   In order to maximize function patient will be able to...   - Decrease intensity/duration/frequency of pain to 4/10  - Demonstrate symmetrical hip AROM without pain  - Increase hip/LE strength to 4/5 throughout  - Demonstrate improved hip flexibility as demonstrated by increased ROM through therapeutic exercise    Functional Goals 6-8 weeks  In order to return to prior level of function patient will be able to...   - Participate in ADL's/IADL's/sport specific activities with no greater than 2/10 pain.    - Increase Functional Status Measure (FOTO) to: anticipated at discharge  - Demonstrate independence and compliant with HEP  - Demonstrate a squat and or sit to stand with good mechanics and eccentric control without pain/difficulty/compensation  - Demonstrate functional activities with good core and glute strength without compensation/pain/difficulty   - Ascend and descend stairs without increased pain/compensation/difficulty and a reciprocal gait pattern.  - Patient will be able to demonstrate good gait mechanics without compensations.     Plan  Patient would benefit from: skilled PT  Planned modality interventions: cryotherapy  Other planned modality interventions: moist heat  Planned therapy interventions: joint mobilization, manual therapy, neuromuscular re-education, patient education, strengthening, stretching, therapeutic activities, therapeutic exercise, home exercise program, functional ROM exercises,  Chastity taping, postural training, balance/weight bearing training, body mechanics training, flexibility, IASTM, kinesiology taping, massage, nerve gliding and transfer training  Frequency: 1-2x/week.  Duration in weeks: 4  Treatment plan discussed with: patient, PTA and referring physician      Subjective Evaluation    History of Present Illness  Date of onset: 1/10/2024  Date of surgery: 2024  Mechanism of injury: surgery  Mechanism of injury: Kemal Huddleston is a 68 y.o. male who presents fell in his backyard on 1/10/2024. He went to the ER and x-ray revealed peritrochanteric femur fracture. He received surgery on 2024, with mateo and screws placed ORIF along lateral Right thigh. Patient had home PT for about 2-3 weeks and was discharged on . Patient last saw orthopedic on  and was recommended for PT at this time. Patient walking with rolling walker at this time WBAT. He reports minimal pain that has been managed with rest and aspirin as needed. He denies radicular symptoms or paresthesias.    Quality of life: good    Patient Goals  Patient goals for therapy: decreased pain, increased motion, improved balance, increased strength, independence with ADLs/IADLs and return to sport/leisure activities    Pain  Current pain ratin  At best pain ratin  At worst pain ratin  Location: Right leg/knee  Quality: dull ache  Relieving factors: ice and medications  Aggravating factors: sitting, standing, stair climbing, walking and lifting  Progression: improved    Social Support  Steps to enter house: yes  Stairs in house: yes   Lives in: multiple-level home  Lives with: spouse    Treatments  Current treatment: physical therapy  Discharged from (in last 30 days): home health care      Objective     Observations     Additional Observation Details  Incision Site: Right hip to knee / lateral thigh; healing well    Gait Assessment: with rolling walker, WBAT, decreased Right LE Wbing/Weight-shifting with  decreased Left LE advancement; forward hunch and heavy reliance on UE support with rolling walker to advance forward.     Palpation     Right   Muscle spasm in the gluteus medius, iliopsoas, piriformis and TFL.   Tenderness of the gluteus medius, iliopsoas, piriformis and TFL.     Tenderness     Right Hip   Tenderness in the ASIS and greater trochanter.     Active Range of Motion   Left Hip   Normal active range of motion    Right Hip   Flexion: 0 (unable to initiate supine march, seated march, or supine SLR) degrees   Abduction: 20 degrees   External rotation (90/90): 25 degrees   Internal rotation (90/90): 25 degrees     Additional Active Range of Motion Details  Knee AROM: Right 2-104 deg with tightness; Left 1-105 deg with tightness    Passive Range of Motion   Left Hip   Normal passive range of motion    Right Hip   Flexion: 100 (SKTC) degrees   Abduction: 25 degrees   External rotation (90/90): 35 degrees   Internal rotation (90/90): 30 degrees     Additional Passive Range of Motion Details  Hamstring Flexibility: moderate tightness bilaterally at 75% of full PROM    Strength/Myotome Testing     Left Hip   Normal muscle strength    Right Hip   Planes of Motion   Flexion: 2+  Extension: 3  Abduction: 4-  Adduction: 4-  External rotation: 3+  Internal rotation: 3+    Additional Strength Details  Knee MMT: flex/ext Right 4-/5, Left 4+/5    Ankle MMT: DF/PF Right 4-/5, Left 4+/5    Tests     Additional Tests Details  Timed Up and Go: 19.48 sec with rolling walker             Diagnosis: s/p Right trochanteric fx with ORIF (DOS: 1/11/2024)  Precautions: WBAT, HTN  ( * asterisk indicates given per HEP)  Next Physician Appointment:   Cristofer HEP:     Manuals 2/12         STM          PROM R hip and knee          HS stretch                              Neuro Re-Ed          Bridges over physioball          Quad Sets          LAQs/ SAQs          Hip ADD ball squeeze          BKFO          Standing TKEs           Standing hip ABD, Ext          Standing Marches                                        Ther Ex          FLAQUITO stretch          HS stretch          Heel slides with strap          DKTC with ball          Heel Raises          Side-stepping                                        Ther Activity          Bike for LE mobility          Gait Training                                        Pt Ed HEP, POC         Re-Evaluation          Modalities          Heat/ice (PRN)

## 2024-02-16 ENCOUNTER — OFFICE VISIT (OUTPATIENT)
Dept: PHYSICAL THERAPY | Facility: CLINIC | Age: 69
End: 2024-02-16
Payer: COMMERCIAL

## 2024-02-16 DIAGNOSIS — Z87.81 STATUS POST OPEN REDUCTION AND INTERNAL FIXATION (ORIF) OF FRACTURE: ICD-10-CM

## 2024-02-16 DIAGNOSIS — S72.144D NONDISPLACED INTERTROCHANTERIC FRACTURE OF RIGHT FEMUR, SUBSEQUENT ENCOUNTER FOR CLOSED FRACTURE WITH ROUTINE HEALING: Primary | ICD-10-CM

## 2024-02-16 DIAGNOSIS — Z98.890 STATUS POST OPEN REDUCTION AND INTERNAL FIXATION (ORIF) OF FRACTURE: ICD-10-CM

## 2024-02-16 PROCEDURE — 97140 MANUAL THERAPY 1/> REGIONS: CPT | Performed by: PHYSICAL THERAPIST

## 2024-02-16 PROCEDURE — 97112 NEUROMUSCULAR REEDUCATION: CPT | Performed by: PHYSICAL THERAPIST

## 2024-02-16 PROCEDURE — 97110 THERAPEUTIC EXERCISES: CPT | Performed by: PHYSICAL THERAPIST

## 2024-02-16 NOTE — PROGRESS NOTES
Daily Note     Today's date: 2024  Patient name: Kemal Huddleston  : 1955  MRN: [id number]  Referring provider: Frandy Vaca DO  Dx:   Encounter Diagnosis     ICD-10-CM    1. Nondisplaced intertrochanteric fracture of right femur, subsequent encounter for closed fracture with routine healing  S72.144D       2. Status post open reduction and internal fixation (ORIF) of fracture  Z98.890     Z87.81           Start Time: 0915  Stop Time: 1000  Total time in clinic (min): 45 minutes    Subjective: Patient reports continued soreness, but is more aware of Right weight-shifting.      Objective: See treatment diary below      Assessment: Tolerated treatment well. Initiated session with standing LE strengthening exercises. Continues to have difficulty with Right Wbing, however slightly improved with cuing and repetitions. Patient unable to perform supine SLR or hip marching, however able to perform LAQs and SAQs without difficulty. Some difficulty assuming H/L position due to Right knee weakness, however tolerated hip adduction ball squeeze with good tolerance. Gentle PROM of Right hip to tolerance, with limitations noted primarily with hip ER and IR. Patient exhibited good technique with therapeutic exercises and would benefit from continued PT      Plan: Continue per plan of care.  Progress treatment as tolerated.       Diagnosis: s/p Right trochanteric fx with ORIF (DOS: 2024)  Precautions: WBAT, HTN  ( * asterisk indicates given per HEP)  Next Physician Appointment:   Cristofer HEP:     Manuals         STM  DK        PROM R hip and knee  DK        HS stretch                              Neuro Re-Ed          Bridges over physioball          Quad Sets          LAQs/ SAQs  SAQs R x20        Hip ADD ball squeeze  H/L x15        BKFO          Standing TKEs          Standing hip ABD, Ext  x10ea        Standing Marches                                        Ther Ex          FLAQUITO stretch  attempted    "     HS stretch  Seated 10\"x5 ea        Heel slides with strap          DKTC with ball          Heel Raises  Stand 2x10        Side-stepping  X3 laps                                      Ther Activity          Bike for LE mobility          Gait Training  T/o for Wbing cuing                                      Pt Ed HEP, POC POC        Re-Evaluation          Modalities          Heat/ice (PRN)                             "

## 2024-02-19 ENCOUNTER — OFFICE VISIT (OUTPATIENT)
Dept: PHYSICAL THERAPY | Facility: CLINIC | Age: 69
End: 2024-02-19
Payer: COMMERCIAL

## 2024-02-19 DIAGNOSIS — S72.144D NONDISPLACED INTERTROCHANTERIC FRACTURE OF RIGHT FEMUR, SUBSEQUENT ENCOUNTER FOR CLOSED FRACTURE WITH ROUTINE HEALING: Primary | ICD-10-CM

## 2024-02-19 DIAGNOSIS — Z98.890 STATUS POST OPEN REDUCTION AND INTERNAL FIXATION (ORIF) OF FRACTURE: ICD-10-CM

## 2024-02-19 DIAGNOSIS — Z87.81 STATUS POST OPEN REDUCTION AND INTERNAL FIXATION (ORIF) OF FRACTURE: ICD-10-CM

## 2024-02-19 PROCEDURE — 97110 THERAPEUTIC EXERCISES: CPT

## 2024-02-19 PROCEDURE — 97112 NEUROMUSCULAR REEDUCATION: CPT

## 2024-02-19 PROCEDURE — 97530 THERAPEUTIC ACTIVITIES: CPT

## 2024-02-19 NOTE — PROGRESS NOTES
"Daily Note     Today's date: 2024  Patient name: Kemal Huddleston  : 1955  MRN: [id number]  Referring provider: Frandy Vaca DO  Dx:   Encounter Diagnosis     ICD-10-CM    1. Nondisplaced intertrochanteric fracture of right femur, subsequent encounter for closed fracture with routine healing  S72.144D       2. Status post open reduction and internal fixation (ORIF) of fracture  Z98.890     Z87.81                      Subjective: Pt states that he hasn't had a chance to do his ex's the past couple of days because of work being done in his house.  He states that he is stiff overall.      Objective: See treatment diary below      Assessment: Tolerated treatment well.  Progressed pt with strengthening and gait training as tolerated.  Weight shifting laterally and forward to improve gait mechanics. Cues during side stepping to avoid hip ER on RLE with stepping to the R.   Gait training with SPC with decreased knee and hip flexion noted on RLE.  Antalgic gait noted.  CGS for getting on and off of upright bike for safety.  Pt will benefit from continued therapy.  Will progress as tolerated.      Plan: Continue per plan of care.      Diagnosis: s/p Right trochanteric fx with ORIF (DOS: 2024)  Precautions: WBAT, HTN  ( * asterisk indicates given per HEP)  Next Physician Appointment:   [facility] HEP:     Manuals        STM  DK        PROM R hip and knee  DK TH       HS stretch                              Neuro Re-Ed          Bridges over physioball   Bridge 15x       Quad Sets          LAQs/ SAQs  SAQs R x20 LAQ  Rx20       Hip ADD ball squeeze  H/L x15        BKFO   GTB x20 ea       Standing TKEs          Standing hip ABD, Ext  x10ea X15 abd       Standing Marches   15x R                                     Ther Ex          FLAQUITO stretch  attempted        HS stretch  Seated 10\"x5 ea        Heel slides with strap          DKTC with ball          Heel Raises  Stand 2x10 Stand  2x10     "   Side-stepping  X3 laps X3 laps       Weight shifting   20x s/s f/b                           Ther Activity          Bike for LE mobility   Seat 9  uprite       Gait Training  T/o for Wbing cuing W/  SPC                                      Pt Ed HEP, POC POC        Re-Evaluation          Modalities          Heat/ice (PRN)

## 2024-02-21 PROBLEM — Z12.5 PROSTATE CANCER SCREENING: Status: RESOLVED | Noted: 2020-07-10 | Resolved: 2024-02-21

## 2024-02-23 ENCOUNTER — OFFICE VISIT (OUTPATIENT)
Dept: PHYSICAL THERAPY | Facility: CLINIC | Age: 69
End: 2024-02-23
Payer: COMMERCIAL

## 2024-02-23 DIAGNOSIS — Z98.890 STATUS POST OPEN REDUCTION AND INTERNAL FIXATION (ORIF) OF FRACTURE: ICD-10-CM

## 2024-02-23 DIAGNOSIS — S72.144D NONDISPLACED INTERTROCHANTERIC FRACTURE OF RIGHT FEMUR, SUBSEQUENT ENCOUNTER FOR CLOSED FRACTURE WITH ROUTINE HEALING: Primary | ICD-10-CM

## 2024-02-23 DIAGNOSIS — Z87.81 STATUS POST OPEN REDUCTION AND INTERNAL FIXATION (ORIF) OF FRACTURE: ICD-10-CM

## 2024-02-23 PROCEDURE — 97110 THERAPEUTIC EXERCISES: CPT | Performed by: PHYSICAL THERAPIST

## 2024-02-23 PROCEDURE — 97112 NEUROMUSCULAR REEDUCATION: CPT | Performed by: PHYSICAL THERAPIST

## 2024-02-23 PROCEDURE — 97140 MANUAL THERAPY 1/> REGIONS: CPT | Performed by: PHYSICAL THERAPIST

## 2024-02-23 NOTE — PROGRESS NOTES
Daily Note     Today's date: 2024  Patient name: Kemal Huddleston  : 1955  MRN: [id number]  Referring provider: Frandy Vaca DO  Dx:   Encounter Diagnosis     ICD-10-CM    1. Nondisplaced intertrochanteric fracture of right femur, subsequent encounter for closed fracture with routine healing  S72.144D       2. Status post open reduction and internal fixation (ORIF) of fracture  Z98.890     Z87.81           Start Time: 835  Stop Time: 915  Total time in clinic (min): 40 minutes    Subjective: Patient reports he is feeling a little better. He reports he still has achiness in his Right leg but no sharp pains.       Objective: See treatment diary below      Assessment: Tolerated treatment well. Initiated sessions with upright bike for LE mobility and posture, with assistance required for getting on. Added standing TKEs with good tolerance. Added DKTC with ball to improve LE control and knee mobility. Required cuing to avoid hip ER and maintaining proper alignment. Patient noted to have improve Right hip PROM in all planes, continues to be limitation in ER and IR. Improving ability to initiate hip flexion in marching position in supine, however still unable to perform supine SLR. Patient exhibited good technique with therapeutic exercises and would benefit from continued PT      Plan: Continue per plan of care.  Progress treatment as tolerated.       Diagnosis: s/p Right trochanteric fx with ORIF (DOS: 2024)  Precautions: WBAT, HTN  ( * asterisk indicates given per HEP)  Next Physician Appointment:   [facility] HEP:     Manuals         STM  DK          PROM R hip and knee  DK TH DK        HS stretch                                    Neuro Re-Ed            Bridges over physioball   Bridge 15x Bridge 15x        Quad Sets            LAQs/ SAQs  SAQs R x20 LAQ  Rx20 LAQ  Semaj x20        Hip ADD ball squeeze  H/L x15          BKFO   GTB x20 ea Alt x15ea        Standing TKEs    BTB R 2x10  "       Standing hip ABD, Ext  x10ea X15 abd         Standing Marches   15x R Alt x10ea        Tandem amb    @ mirror x3 laps                                            Ther Ex            FLAQUITO stretch  attempted          HS stretch  Seated 10\"x5 ea          Heel slides with strap            DKTC with ball    15x cues        Heel Raises  Stand 2x10 Stand  2x10 Stand  2x10        Side-stepping  X3 laps X3 laps X3 laps        Weight shifting   20x s/s f/b                                 Ther Activity            Bike for LE mobility   Seat 9  uprite Seat 9 upright x5min        Gait Training  T/o for Wbing cuing W/  SPC                                              Pt Ed HEP, POC POC          Re-Evaluation            Modalities            Heat/ice (PRN)                                     "

## 2024-02-26 ENCOUNTER — OFFICE VISIT (OUTPATIENT)
Dept: PHYSICAL THERAPY | Facility: CLINIC | Age: 69
End: 2024-02-26
Payer: COMMERCIAL

## 2024-02-26 DIAGNOSIS — Z98.890 STATUS POST OPEN REDUCTION AND INTERNAL FIXATION (ORIF) OF FRACTURE: ICD-10-CM

## 2024-02-26 DIAGNOSIS — Z87.81 STATUS POST OPEN REDUCTION AND INTERNAL FIXATION (ORIF) OF FRACTURE: ICD-10-CM

## 2024-02-26 DIAGNOSIS — S72.144D NONDISPLACED INTERTROCHANTERIC FRACTURE OF RIGHT FEMUR, SUBSEQUENT ENCOUNTER FOR CLOSED FRACTURE WITH ROUTINE HEALING: Primary | ICD-10-CM

## 2024-02-26 PROCEDURE — 97530 THERAPEUTIC ACTIVITIES: CPT

## 2024-02-26 PROCEDURE — 97110 THERAPEUTIC EXERCISES: CPT

## 2024-02-26 PROCEDURE — 97112 NEUROMUSCULAR REEDUCATION: CPT

## 2024-02-26 NOTE — PROGRESS NOTES
Daily Note     Today's date: 2024  Patient name: Kemal Huddleston  : 1955  MRN: [id number]  Referring provider: Frandy Vaca DO  Dx:   Encounter Diagnosis     ICD-10-CM    1. Nondisplaced intertrochanteric fracture of right femur, subsequent encounter for closed fracture with routine healing  S72.144D       2. Status post open reduction and internal fixation (ORIF) of fracture  Z98.890     Z87.81                      Subjective: Pt states that he is feeling better.  He is able to pick his foot up easier when in the shower.  Pt is hoping to start walking more without his walker and use a cane since his surgeon is expecting that.  Pt states that he did walk with a little wobble before this happened because of his knee pain.      Objective: See treatment diary below      Assessment: Tolerated treatment well.  Progressed pt with strengthening as tolerated.  Focused on weight shifting and ambulation with a SPC as this is pt's goal for this week.  Pt had less antalgic gait this visit with SPC, however after he ambulation 20'x2 fatigue was noted and cues were given for safety.  Pt reports understanding safety with SPC and compliance with his HEP.  Pt will benefit from continued therapy.        Plan: Continue per plan of care.      Diagnosis: s/p Right trochanteric fx with ORIF (DOS: 2024)  Precautions: WBAT, HTN  ( * asterisk indicates given per HEP)  Next Physician Appointment:   [facility] HEP:     Manuals        STM  DK          PROM R hip and knee  DK TH DK TH       HS stretch                                    Neuro Re-Ed            Bridges over physioball   Bridge 15x Bridge 15x        Quad Sets            LAQs/ SAQs  SAQs R x20 LAQ  Rx20 LAQ  Semaj x20 LAQ  1.5# 20x       Hip ADD ball squeeze  H/L x15          BKFO   GTB x20 ea Alt x15ea        Standing TKEs    BTB R 2x10 BTB  R 2x10       Standing hip ABD, Ext  x10ea X15 abd         Standing Marches   15x R Alt x10ea  "Alt  10x2 ea       Tandem amb    @ mirror x3 laps        Wobble board     2' f/b                               Ther Ex            FLAQUITO stretch  attempted          HS stretch  Seated 10\"x5 ea          Heel slides with strap            DKTC with ball    15x cues        Heel Raises  Stand 2x10 Stand  2x10 Stand  2x10        Side-stepping  X3 laps X3 laps X3 laps X3 laps       Weight shifting   20x s/s f/b  20x ea                               Ther Activity            Bike for LE mobility   Seat 9  uprite Seat 9 upright x5min Seat 9 uprt  x5'       Gait Training  T/o for Wbing cuing W/  SPC   W/  SPC                                           Pt Ed HEP, POC POC   HEP       Re-Evaluation            Modalities            Heat/ice (PRN)                                       "

## 2024-03-01 ENCOUNTER — OFFICE VISIT (OUTPATIENT)
Dept: PHYSICAL THERAPY | Facility: CLINIC | Age: 69
End: 2024-03-01
Payer: COMMERCIAL

## 2024-03-01 DIAGNOSIS — Z98.890 STATUS POST OPEN REDUCTION AND INTERNAL FIXATION (ORIF) OF FRACTURE: ICD-10-CM

## 2024-03-01 DIAGNOSIS — Z87.81 STATUS POST OPEN REDUCTION AND INTERNAL FIXATION (ORIF) OF FRACTURE: ICD-10-CM

## 2024-03-01 DIAGNOSIS — S72.144D NONDISPLACED INTERTROCHANTERIC FRACTURE OF RIGHT FEMUR, SUBSEQUENT ENCOUNTER FOR CLOSED FRACTURE WITH ROUTINE HEALING: Primary | ICD-10-CM

## 2024-03-01 PROCEDURE — 97530 THERAPEUTIC ACTIVITIES: CPT | Performed by: PHYSICAL THERAPIST

## 2024-03-01 PROCEDURE — 97112 NEUROMUSCULAR REEDUCATION: CPT | Performed by: PHYSICAL THERAPIST

## 2024-03-01 PROCEDURE — 97140 MANUAL THERAPY 1/> REGIONS: CPT | Performed by: PHYSICAL THERAPIST

## 2024-03-01 NOTE — PROGRESS NOTES
Daily Note     Today's date: 3/1/2024  Patient name: Kemal Huddleston  : 1955  MRN: [id number]  Referring provider: Frandy Vaca DO  Dx:   Encounter Diagnosis     ICD-10-CM    1. Nondisplaced intertrochanteric fracture of right femur, subsequent encounter for closed fracture with routine healing  S72.144D       2. Status post open reduction and internal fixation (ORIF) of fracture  Z98.890     Z87.81           Start Time: 830  Stop Time: 915  Total time in clinic (min): 45 minutes    Subjective: Patient reports he uses rolling walker primarily for outside walking, he tries to use the cane for indoor walking more.      Objective: See treatment diary below      Assessment: Tolerated treatment well. Initiated session with weight-shifting / side stepping to improve hip abduction and step lengths. Patient had improved tolerance and stability with Wobble board in anterior/posterior direction. Added [place] TKEs to progress quad strength. Patient continues to be challenged and unable to perform supine SLR or marching, able to initiate but not able to complete due to weakness. PROM/hip mobility progressing without significant ERP. Patient exhibited good technique with therapeutic exercises and would benefit from continued PT      Plan: Continue per plan of care.  Progress treatment as tolerated.       Diagnosis: s/p Right trochanteric fx with ORIF (DOS: 2024)  Precautions: WBAT, HTN  ( * asterisk indicates given per HEP)  Next Physician Appointment:   [facility] HEP:     Manuals 2/12 2/16 2/19 2/23 2/26 3/1       STM  DK    DK       PROM R hip and knee  DK TH DK TH DK       HS stretch                                       Neuro Re-Ed             Bridges over physioball   Bridge 15x Bridge 15x         Quad Sets             LAQs/ SAQs  SAQs R x20 LAQ  Rx20 LAQ  Semaj x20 LAQ  1.5# 20x        Hip ADD ball squeeze  H/L x15           BKFO   GTB x20 ea Alt x15ea  GTB 3x5ea       Standing TKEs    BTB R 2x10 BTB  R 2x10  "[place] 10# 2x10       Standing hip ABD, Ext  x10ea X15 abd          Standing Marches   15x R Alt x10ea Alt  10x2 ea        Tandem amb    @ mirror x3 laps  @ mirror x3 laps       Wobble board     2' f/b 2' f/b                                 Ther Ex             FLAQUITO stretch  attempted           HS stretch  Seated 10\"x5 ea           Heel slides with strap             DKTC with ball    15x cues         Heel Raises  Stand 2x10 Stand  2x10 Stand  2x10         Side-stepping  X3 laps X3 laps X3 laps X3 laps X3 laps       Weight shifting   20x s/s f/b  20x ea                                  Ther Activity             Bike for LE mobility   Seat 9  uprite Seat 9 upright x5min Seat 9 uprt  x5' Seat 9 uprt  x5'       Gait Training  T/o for Wbing cuing W/  SPC   W/  SPC W/ SPC t/o                                              Pt Ed HEP, POC POC   HEP        Re-Evaluation             Modalities             Heat/ice (PRN)                                           "

## 2024-03-04 ENCOUNTER — APPOINTMENT (OUTPATIENT)
Dept: RADIOLOGY | Facility: AMBULARY SURGERY CENTER | Age: 69
End: 2024-03-04
Attending: STUDENT IN AN ORGANIZED HEALTH CARE EDUCATION/TRAINING PROGRAM
Payer: COMMERCIAL

## 2024-03-04 ENCOUNTER — OFFICE VISIT (OUTPATIENT)
Dept: PHYSICAL THERAPY | Facility: CLINIC | Age: 69
End: 2024-03-04
Payer: COMMERCIAL

## 2024-03-04 ENCOUNTER — OFFICE VISIT (OUTPATIENT)
Dept: OBGYN CLINIC | Facility: CLINIC | Age: 69
End: 2024-03-04

## 2024-03-04 VITALS
SYSTOLIC BLOOD PRESSURE: 113 MMHG | HEART RATE: 71 BPM | DIASTOLIC BLOOD PRESSURE: 74 MMHG | WEIGHT: 291.67 LBS | BODY MASS INDEX: 40.83 KG/M2 | HEIGHT: 71 IN

## 2024-03-04 DIAGNOSIS — S72.144D NONDISPLACED INTERTROCHANTERIC FRACTURE OF RIGHT FEMUR, SUBSEQUENT ENCOUNTER FOR CLOSED FRACTURE WITH ROUTINE HEALING: Primary | ICD-10-CM

## 2024-03-04 DIAGNOSIS — Z98.890 STATUS POST OPEN REDUCTION AND INTERNAL FIXATION (ORIF) OF FRACTURE: ICD-10-CM

## 2024-03-04 DIAGNOSIS — Z87.81 S/P RIGHT HIP FRACTURE: Primary | ICD-10-CM

## 2024-03-04 DIAGNOSIS — Z87.81 STATUS POST OPEN REDUCTION AND INTERNAL FIXATION (ORIF) OF FRACTURE: ICD-10-CM

## 2024-03-04 DIAGNOSIS — S72.144A NONDISPLACED INTERTROCHANTERIC FRACTURE OF RIGHT FEMUR, INITIAL ENCOUNTER FOR CLOSED FRACTURE (HCC): ICD-10-CM

## 2024-03-04 DIAGNOSIS — E66.01 CLASS 2 SEVERE OBESITY DUE TO EXCESS CALORIES WITH SERIOUS COMORBIDITY AND BODY MASS INDEX (BMI) OF 39.0 TO 39.9 IN ADULT: ICD-10-CM

## 2024-03-04 PROCEDURE — 97110 THERAPEUTIC EXERCISES: CPT

## 2024-03-04 PROCEDURE — 73502 X-RAY EXAM HIP UNI 2-3 VIEWS: CPT

## 2024-03-04 PROCEDURE — 97112 NEUROMUSCULAR REEDUCATION: CPT

## 2024-03-04 PROCEDURE — 97530 THERAPEUTIC ACTIVITIES: CPT

## 2024-03-04 PROCEDURE — 99024 POSTOP FOLLOW-UP VISIT: CPT | Performed by: STUDENT IN AN ORGANIZED HEALTH CARE EDUCATION/TRAINING PROGRAM

## 2024-03-04 NOTE — PROGRESS NOTES
Orthopaedic Surgery - Office Note  Kemal Huddleston (68 y.o. male)   : 1955   MRN: [id number]  Encounter Date: 3/4/2024    Chief Complaint   Patient presents with    Right Hip - Post-op     Past Surgical History:   Procedure Laterality Date    COLONOSCOPY  2015    RI OPTX FEM SHFT FX W/INSJ IMED IMPLT W/WO SCREW Right 2024    Procedure: INSERTION NAIL IM FEMUR ANTEGRADE (TROCHANTERIC), and all associated procedures;  Surgeon: Frandy Vaca DO;  Location: [facility];  Service: Orthopedics    TONSILLECTOMY      VASECTOMY       Assessment / Plan  S/p insertion right femoral intramedullary nail, DOS: 24  Right peritrochanteric femur fracture    X-rays reviewed and discussed with patient revealing: maintained alignment of patient's previous peritrochanteric fracture with no signs of hardware failure or loosening.  Oval fracture healing with increase in callus formation  Patient to continue to be weightbearing as tolerated to the right lower extremity  No longer requires DVT ppx  Range of motion as tolerated to right lower extremity  Patient to continue PT for strength and gait training  Pt may continue the use of a cane and transition to no assistance   Patient to continue at home analgesic medication with Tylenol  Patient to follow-up in 6 weeks for repeat x-ray right hip and reevaluation    History of Present Illness  Kemal Huddleston is a 68 y.o. male who presents 2 weeks s/p S/p insertion right femoral intramedullary nail, DOS: 24. He presents today using a walker to assist with ambulation. He states he is doing well and denies any significant pain of the right lower extremity. He takes Tylenol for pain relief. He states overall he is doing well. He continues to work with at home PT.  He denies any numbness, tingling or paresthesias.     Interval history 2024: Kemal is here today for follow up 8 weeks s/p right femoral cephalomedullary nail. He is doing well with minimal pain. He has  "been progressing with PT and no longer uses a walker. He uses a cane. He has been completed ASA for dvt ppx. He denies any numbness, tingling, or paresthesias.     Review of Systems  Pertinent items are noted in HPI.  All other systems were reviewed and are negative.    Physical Exam  /74 (BP Location: Right arm, Patient Position: Sitting, Cuff Size: Large)   Pulse 71   Ht 5' 11\" (1.803 m)   Wt 132 kg (291 lb 10.7 oz)   BMI 40.68 kg/m²   Cons: Appears well.  No apparent distress.  Psych: Alert. Oriented x3.  Mood and affect normal.  Eyes: PERRLA, EOMI  Resp: Normal effort.  No audible wheezing or stridor.  CV: Palpable pulse.  No discernable arrhythmia.    Lymph:  No palpable cervical, axillary, or inguinal lymphadenopathy.  Skin: Warm.  No palpable masses.  No visible lesions.  Neuro: Normal muscle tone.  Normal and symmetric DTR's.     Right lower extremity   The right  lower extremity was exposed and inspected. Surgical incisions are well healed without drainage or signs of dehiscence. All other visible skin intact without erythema, ecchymosis, effusion or obvious osseous deformity. No tenderness about the hip. Pt able to range hip from 0-110 degrees of flexion, 30 degrees to external rotation, and 10 degrees of internal rotation. Sensation intact to superficial peroneal, deep peroneal, sural, saphenous, plantar nerve distributions. Motor intact to extensor hallux longus, tibialis anterior, gastrocnemius muscles, extensor mechanism intact. Limb is well perfused. Brisk capillary refill in all 5 digits. Compartments soft and compressible.     Studies Reviewed  X-rays right femur reveal maintained alignment of patient's previous peritrochanteric fracture with no signs of hardware failure or loosening.  The fracture is healing with callus formation      Procedures  none    Medical, Surgical, Family, and Social History  The patient's medical history, family history, and social history, were reviewed and " updated as appropriate.    Past Medical History:   Diagnosis Date    Hypertension            Family History   Problem Relation Age of Onset    Cancer Mother     Hypertension Father        Social History     Occupational History    Not on file   Tobacco Use    Smoking status: Never    Smokeless tobacco: Never   Vaping Use    Vaping status: Never Used   Substance and Sexual Activity    Alcohol use: Yes     Alcohol/week: 4.0 standard drinks of alcohol     Types: 1 Glasses of wine, 1 Cans of beer, 1 Shots of liquor, 1 Standard drinks or equivalent per week     Comment: occasional    Drug use: Never    Sexual activity: Not on file       No Known Allergies      Current Outpatient Medications:     amLODIPine (NORVASC) 5 mg tablet, TAKE 1 TABLET BY MOUTH DAILY, Disp: 100 tablet, Rfl: 2    olmesartan-hydrochlorothiazide (BENICAR HCT) 20-12.5 MG per tablet, TAKE 1 TABLET BY MOUTH DAILY, Disp: 100 tablet, Rfl: 2    acetaminophen (TYLENOL) 325 mg tablet, Take 2 tablets (650 mg total) by mouth every 6 (six) hours, Disp: , Rfl:     aspirin 325 mg tablet, Take 1 tablet (325 mg total) by mouth 2 (two) times a day, Disp: 84 tablet, Rfl: 0      Elmer Smith MD    Scribe Attestation      I,:   am acting as a scribe while in the presence of the attending physician.:       I,:   personally performed the services described in this documentation    as scribed in my presence.:

## 2024-03-04 NOTE — PROGRESS NOTES
"3/4Daily Note     Today's date: 3/4/2024  Patient name: Kemal Huddleston  : 1955  MRN: [id number]  Referring provider: Frandy Vaca DO  Dx:   Encounter Diagnosis     ICD-10-CM    1. Nondisplaced intertrochanteric fracture of right femur, subsequent encounter for closed fracture with routine healing  S72.144D       2. Status post open reduction and internal fixation (ORIF) of fracture  Z98.890     Z87.81                      Subjective: Pt states that he is doing well, walking more often with his cane inside.  Outside he is using the walker more to be safe.  Pt reports that he is able to move more without pain.        Objective: See treatment diary below      Assessment: Tolerated treatment well.  Progressed pt with standing strengthening/balance activities as tolerated.  No reports of increased pain.  Step ups onto 4\" step with RLE with 1 UE support on L.  Light B/L UE support needed with balance.  Will continue to progress pt as tolerated as he will benefited from continued therapy.        Plan: Continue per plan of care.      Diagnosis: s/p Right trochanteric fx with ORIF (DOS: 2024)  Precautions: WBAT, HTN  ( * asterisk indicates given per HEP)  Next Physician Appointment:   [facility] HEP:     Manuals 2/12 2/16 2/19 2/23 2/26 3/1 3/4      STM  DK    DK TH      PROM R hip and knee  DK TH DK TH DK TH      HS stretch                                       Neuro Re-Ed             Bridges over physioball   Bridge 15x Bridge 15x         Quad Sets             LAQs/ SAQs  SAQs R x20 LAQ  Rx20 LAQ  Semaj x20 LAQ  1.5# 20x        Hip ADD ball squeeze  H/L x15           BKFO   GTB x20 ea Alt x15ea  GTB 3x5ea GTB  4x5 ea      Standing TKEs    BTB R 2x10 BTB  R 2x10 [place] 10# 2x10       Standing hip ABD, Ext  x10ea X15 abd          Standing Marches   15x R Alt x10ea Alt  10x2 ea        Tandem amb    @ mirror x3 laps  @ mirror x3 laps Balance  30\"x2 ea      Wobble board     2' f/b 2' f/b 2' ea      SLS       20\"x2 " "ea                   Ther Ex             FLAQUITO stretch  attempted           HS stretch  Seated 10\"x5 ea           Heel slides with strap             DKTC with ball    15x cues         Heel Raises  Stand 2x10 Stand  2x10 Stand  2x10         Side-stepping  X3 laps X3 laps X3 laps X3 laps X3 laps X3 laps      Weight shifting   20x s/s f/b  20x ea                                  Ther Activity             Bike for LE mobility   Seat 9  uprite Seat 9 upright x5min Seat 9 uprt  x5' Seat 9 uprt  x5' Seat  9 uprt  x5'      Gait Training  T/o for Wbing cuing W/  SPC   W/  SPC W/ SPC t/o       Step ups       4\" 10x                                Pt Ed HEP, POC POC   HEP        Re-Evaluation             Modalities             Heat/ice (PRN)                                             "

## 2024-03-08 ENCOUNTER — OFFICE VISIT (OUTPATIENT)
Dept: PHYSICAL THERAPY | Facility: CLINIC | Age: 69
End: 2024-03-08
Payer: COMMERCIAL

## 2024-03-08 DIAGNOSIS — S72.144D NONDISPLACED INTERTROCHANTERIC FRACTURE OF RIGHT FEMUR, SUBSEQUENT ENCOUNTER FOR CLOSED FRACTURE WITH ROUTINE HEALING: Primary | ICD-10-CM

## 2024-03-08 DIAGNOSIS — Z87.81 STATUS POST OPEN REDUCTION AND INTERNAL FIXATION (ORIF) OF FRACTURE: ICD-10-CM

## 2024-03-08 DIAGNOSIS — Z98.890 STATUS POST OPEN REDUCTION AND INTERNAL FIXATION (ORIF) OF FRACTURE: ICD-10-CM

## 2024-03-08 PROCEDURE — 97110 THERAPEUTIC EXERCISES: CPT | Performed by: PHYSICAL MEDICINE & REHABILITATION

## 2024-03-08 PROCEDURE — 97112 NEUROMUSCULAR REEDUCATION: CPT | Performed by: PHYSICAL MEDICINE & REHABILITATION

## 2024-03-08 PROCEDURE — 97530 THERAPEUTIC ACTIVITIES: CPT | Performed by: PHYSICAL MEDICINE & REHABILITATION

## 2024-03-08 NOTE — PROGRESS NOTES
"Daily Note     Today's date: 3/8/2024  Patient name: Kemal Huddleston  : 1955  MRN: [id number]  Referring provider: Frandy Vaca DO  Dx:   Encounter Diagnosis     ICD-10-CM    1. Nondisplaced intertrochanteric fracture of right femur, subsequent encounter for closed fracture with routine healing  S72.144D       2. Status post open reduction and internal fixation (ORIF) of fracture  Z98.890     Z87.81                      Subjective: Patient offers no new complaints to begin session.      Objective: See treatment diary below    Assessment: Tolerated treatment well. Intermittent Vcs for form maintenance throughout with good carryover. Challenged with balance activity. Continue as able to progress per patient tolerance towards established goals.      Plan: Continue per plan of care.      Diagnosis: s/p Right trochanteric fx with ORIF (DOS: 2024)  Precautions: WBAT, HTN  ( * asterisk indicates given per HEP)  Next Physician Appointment:   C[facility] HEP:     Manuals 2/12 2/16 2/19 2/23 2/26 3/1 3/4 3/8     STM  DK    DK TH      PROM R hip and knee  DK TH DK TH DK TH LH     HS stretch                                       Neuro Re-Ed        3/8     Bridges over physioball   Bridge 15x Bridge 15x         Quad Sets             LAQs/ SAQs  SAQs R x20 LAQ  Rx20 LAQ  Semaj x20 LAQ  1.5# 20x   LAQ 1.5# 20x     Hip ADD ball squeeze  H/L x15           BKFO   GTB x20 ea Alt x15ea  GTB 3x5ea GTB  4x5 ea GTB 4x5 ea     Standing TKEs    BTB R 2x10 BTB  R 2x10 [place] 10# 2x10       Standing hip ABD, Ext  x10ea X15 abd          Standing Marches   15x R Alt x10ea Alt  10x2 ea        Tandem amb    @ mirror x3 laps  @ mirror x3 laps Balance  30\"x2 ea      Wobble board     2' f/b 2' f/b 2' ea 2' ea     SLS       20\"x2 ea 2x20\"                  Ther Ex        3/8     FLAQUITO stretch  attempted           HS stretch  Seated 10\"x5 ea           Heel slides with strap             DKTC with ball    15x cues         Heel Raises  Stand " "2x10 Stand  2x10 Stand  2x10         Side-stepping  X3 laps X3 laps X3 laps X3 laps X3 laps X3 laps      Weight shifting   20x s/s f/b  20x ea                                  Ther Activity        3/8     Bike for LE mobility   Seat 9  uprite Seat 9 upright x5min Seat 9 uprt  x5' Seat 9 uprt  x5' Seat  9 uprt  x5' 5', seat 9     Gait Training  T/o for Wbing cuing W/  SPC   W/  SPC W/ SPC t/o       Step ups       4\" 10x 4\" 3x5                               Pt Ed HEP, POC POC   HEP        Re-Evaluation             Modalities             Heat/ice (PRN)                                             "

## 2024-03-11 ENCOUNTER — EVALUATION (OUTPATIENT)
Dept: PHYSICAL THERAPY | Facility: CLINIC | Age: 69
End: 2024-03-11
Payer: COMMERCIAL

## 2024-03-11 DIAGNOSIS — S72.144D NONDISPLACED INTERTROCHANTERIC FRACTURE OF RIGHT FEMUR, SUBSEQUENT ENCOUNTER FOR CLOSED FRACTURE WITH ROUTINE HEALING: Primary | ICD-10-CM

## 2024-03-11 DIAGNOSIS — Z98.890 STATUS POST OPEN REDUCTION AND INTERNAL FIXATION (ORIF) OF FRACTURE: ICD-10-CM

## 2024-03-11 DIAGNOSIS — Z87.81 STATUS POST OPEN REDUCTION AND INTERNAL FIXATION (ORIF) OF FRACTURE: ICD-10-CM

## 2024-03-11 PROCEDURE — 97530 THERAPEUTIC ACTIVITIES: CPT | Performed by: PHYSICAL THERAPIST

## 2024-03-11 PROCEDURE — 97140 MANUAL THERAPY 1/> REGIONS: CPT | Performed by: PHYSICAL THERAPIST

## 2024-03-11 NOTE — PROGRESS NOTES
PT Re-Evaluation     Today's date: 3/11/2024  Patient name: Kemal Huddleston  : 1955  MRN: [id number]  Referring provider: Frandy Vaca DO  Dx:   Encounter Diagnosis     ICD-10-CM    1. Nondisplaced intertrochanteric fracture of right femur, subsequent encounter for closed fracture with routine healing  S72.144D       2. Status post open reduction and internal fixation (ORIF) of fracture  Z98.890     Z87.81           Start Time: 1000  Stop Time: 1045  Total time in clinic (min): 45 minutes    Assessment  Assessment details: Patient is a 68 y.o. male who presents to outpatient PT with s/p Right trochanteric fracture with ORIF performed on 2024. Patient is about 2 months post-op at this time, ambulating with single point cane for all distances. He reports improved ability getting in/out of bed, getting out of car, LE dressing, and overall walking tolerance. He continues to have difficulty with Right knee flexion and Right hip flexion in supine position. Able to initiate SLR, but unable to complete at this time due to weakness. Right hip mobility has improved in all planes with minimal pain. This is overall beneficial for better mechanics with walking, transfers, etc. He continues to negotiate steps in non-reciprocal pattern as he is still not comfortable with full weight-shifting to Right LE. His current post-op limitations and impairments affect his mechanics and independence with ADLs and self-care activities. He lives with his wife who assists him with ADLs as needed and driving. Patient has progressed well with PT interventions and exercises thus far. Patient will benefit from continued skilled PT services to further improve pain levels, improve LE mobility and LE strength, improve balance, and improve functional mobility for overall ease with ADLs to progress him towards his PLOF to be independent with daily functional tasks. Patient would benefit from skilled PT services to address these  impairments and to maximize function.  Thank you for the referral.  Impairments: abnormal gait, abnormal or restricted ROM, activity intolerance, impaired balance, impaired physical strength, lacks appropriate home exercise program, pain with function, weight-bearing intolerance, poor posture  and poor body mechanics  Functional limitations: walking, bed mobility, stair negotiation, getting in/out of car, getting shoes/socks onUnderstanding of Dx/Px/POC: good   Prognosis: good    Goals  Impairment Goals 4-6 weeks   In order to maximize function patient will be able to...   - Decrease intensity/duration/frequency of pain to 4/10. Partially Met  - Demonstrate symmetrical hip AROM without pain. Improved  - Increase hip/LE strength to 4/5 throughout. Improved, continued hip flexor weakness  - Demonstrate improved hip flexibility as demonstrated by increased ROM through therapeutic exercise. Improved    Functional Goals 6-8 weeks  In order to return to prior level of function patient will be able to...   - Participate in ADL's/IADL's/sport specific activities with no greater than 2/10 pain.  Improved   - Increase Functional Status Measure (FOTO) to: anticipated at discharge. Improved  - Demonstrate independence and compliant with HEP. Met  - Demonstrate a squat and or sit to stand with good mechanics and eccentric control without pain/difficulty/compensation. Improved  - Demonstrate functional activities with good core and glute strength without compensation/pain/difficulty. Improved   - Ascend and descend stairs without increased pain/compensation/difficulty and a reciprocal gait pattern. Improved  - Patient will be able to demonstrate good gait mechanics without compensations. Improved    Plan  Patient would benefit from: skilled PT  Planned modality interventions: cryotherapy  Other planned modality interventions: moist heat  Planned therapy interventions: joint mobilization, manual therapy, neuromuscular  re-education, patient education, strengthening, stretching, therapeutic activities, therapeutic exercise, home exercise program, functional ROM exercises, Haywood taping, postural training, balance/weight bearing training, body mechanics training, flexibility, IASTM, kinesiology taping, massage, nerve gliding and transfer training  Frequency: 1-2x/week.  Duration in weeks: 4  Treatment plan discussed with: patient, PTA and referring physician      Subjective Evaluation    History of Present Illness  Date of onset: 1/10/2024  Date of surgery: 2024  Mechanism of injury: surgery  Mechanism of injury: Kemal Huddleston is a 68 y.o. male who presents fell in his backyard on 1/10/2024. He went to the ER and x-ray revealed peritrochanteric femur fracture. He received surgery on 2024, with mateo and screws placed ORIF along lateral Right thigh. Patient had home PT for about 2-3 weeks and was discharged on . Patient last saw orthopedic on  and was recommended for PT at this time. Patient walking with rolling walker at this time WBAT. He reports minimal pain that has been managed with rest and aspirin as needed. He denies radicular symptoms or paresthesias.    Quality of life: good    Patient Goals  Patient goals for therapy: decreased pain, increased motion, improved balance, increased strength, independence with ADLs/IADLs and return to sport/leisure activities    Pain  Current pain ratin  At best pain ratin  At worst pain ratin  Location: Right leg/knee  Quality: dull ache  Relieving factors: ice and medications  Aggravating factors: sitting, standing, stair climbing, walking and lifting  Progression: improved    Social Support  Steps to enter house: yes  Stairs in house: yes   Lives in: multiple-level home  Lives with: spouse    Treatments  Current treatment: physical therapy  Discharged from (in last 30 days): home health care      Objective     Observations     Additional Observation  Details  Incision Site: Right hip to knee / lateral thigh; healing well    Gait Assessment: with single point cane, WBAT, decreased Right LE Wbing/Weight-shifting with decreased Left LE advancement; steady 3-point reciprocal gait pattern    Palpation     Right   Muscle spasm in the gluteus medius, iliopsoas, piriformis and TFL.   Tenderness of the gluteus medius, iliopsoas, piriformis and TFL.     Tenderness     Right Hip   Tenderness in the ASIS and greater trochanter.     Active Range of Motion   Left Hip   Normal active range of motion    Right Hip   Flexion: 0 (unable to initiate supine march, seated march, or supine SLR) degrees   Abduction: 25 degrees   External rotation (90/90): 40 degrees   Internal rotation (90/90): 30 degrees     Additional Active Range of Motion Details  Knee AROM: Right 2-112 deg with tightness; Left 0-114 deg with tightness    Passive Range of Motion   Left Hip   Normal passive range of motion    Right Hip   Flexion: 105 (SKTC) degrees   Abduction: 28 degrees   External rotation (90/90): 40 degrees   Internal rotation (90/90): 35 degrees     Additional Passive Range of Motion Details  Hamstring Flexibility: moderate tightness bilaterally at 75% of full PROM    Strength/Myotome Testing     Left Hip   Normal muscle strength    Right Hip   Planes of Motion   Flexion: 3-  Extension: 3+  Abduction: 4-  Adduction: 4-  External rotation: 4-  Internal rotation: 4-    Additional Strength Details  Knee MMT: flex/ext Right 4-/5, Left 4+/5    Ankle MMT: DF/PF Right 4-/5, Left 4+/5    Tests     Additional Tests Details  Timed Up and Go: 15 sec with single point cane             Diagnosis: s/p Right trochanteric fx with ORIF (DOS: 1/11/2024)  Precautions: WBAT, HTN  ( * asterisk indicates given per HEP)  Next Physician Appointment:   Cristofer HEP:     Manuals 2/26 3/1 3/4 3/8 3/11         STM  DK TH           PROM R hip and knee TH DK TH LH DK         HS stretch                                        "   Neuro Re-Ed    3/8          Bridges over physioball              Quad Sets              LAQs/ SAQs LAQ  1.5# 20x   LAQ 1.5# 20x          Hip ADD ball squeeze              BKFO  GTB 3x5ea GTB  4x5 ea GTB 4x5 ea          Standing TKEs BTB  R 2x10 [place] 10# 2x10            Standing hip ABD, Ext              Standing Marches Alt  10x2 ea             Tandem amb  @ mirror x3 laps Balance  30\"x2 ea           Wobble board 2' f/b 2' f/b 2' ea 2' ea          SLS   20\"x2 ea 2x20\"                        Ther Ex    3/8          FLAQUITO stretch              HS stretch              Heel slides with strap              DKTC with ball              Heel Raises              Side-stepping X3 laps X3 laps X3 laps           Weight shifting 20x ea                                         Ther Activity    3/8          Bike for LE mobility Seat 9 uprt  x5' Seat 9 uprt  x5' Seat  9 uprt  x5' 5', seat 9 Recumb seat 19 x5min         Gait Training W/  SPC W/ SPC t/o            Step ups   4\" 10x 4\" 3x5                                      Pt Ed HEP    POC         Re-Evaluation     DK         Modalities              Heat/ice (PRN)                                       "

## 2024-03-15 ENCOUNTER — OFFICE VISIT (OUTPATIENT)
Dept: PHYSICAL THERAPY | Facility: CLINIC | Age: 69
End: 2024-03-15
Payer: COMMERCIAL

## 2024-03-15 DIAGNOSIS — Z87.81 STATUS POST OPEN REDUCTION AND INTERNAL FIXATION (ORIF) OF FRACTURE: ICD-10-CM

## 2024-03-15 DIAGNOSIS — Z98.890 STATUS POST OPEN REDUCTION AND INTERNAL FIXATION (ORIF) OF FRACTURE: ICD-10-CM

## 2024-03-15 DIAGNOSIS — S72.144D NONDISPLACED INTERTROCHANTERIC FRACTURE OF RIGHT FEMUR, SUBSEQUENT ENCOUNTER FOR CLOSED FRACTURE WITH ROUTINE HEALING: Primary | ICD-10-CM

## 2024-03-15 PROCEDURE — 97110 THERAPEUTIC EXERCISES: CPT

## 2024-03-15 PROCEDURE — 97140 MANUAL THERAPY 1/> REGIONS: CPT

## 2024-03-15 PROCEDURE — 97530 THERAPEUTIC ACTIVITIES: CPT

## 2024-03-15 NOTE — PROGRESS NOTES
"Daily Note     Today's date: 3/15/2024  Patient name: Kemal Huddleston  : 1955  MRN: [id number]  Referring provider: Frandy Vaca DO  Dx:   Encounter Diagnosis     ICD-10-CM    1. Nondisplaced intertrochanteric fracture of right femur, subsequent encounter for closed fracture with routine healing  S72.144D       2. Status post open reduction and internal fixation (ORIF) of fracture  Z98.890     Z87.81           Start Time: 830  Stop Time: 915  Total time in clinic (min): 45 minutes    Subjective: Patient reports no new complaints since last session. Overall is doing well.       Objective: See treatment diary below      Assessment: Tolerated treatment well with continued focus on ROM and strength.  Patient did well during PROM with improvement in Abduction noted. Patient is challenged during SLR today- unable to do without using compensatory strategies- modified to SAQ and LAQ with better response and recruitment of quads.  Patient demonstrated good carryover to posture cues during TE's and was fatigued post treatment.  Patient would benefit from continued PT.       Plan: Continue per plan of care.      Diagnosis: s/p Right trochanteric fx with ORIF (DOS: 2024)  Precautions: WBAT, HTN  ( * asterisk indicates given per HEP)  Next Physician Appointment:   Cristofer HEP:     Manuals 2/26 3/1 3/4 3/8 3/11 3/15        [facility]  DK TH           PROM R hip and knee TH DK TH  DK JM        HS stretch                                          Neuro Re-Ed    3/8  3/15        Bridges over physioball              Quad Sets              LAQs/ SAQs LAQ  1.5# 20x   LAQ 1.5# 20x  SAQ #1.5  2x10    LAQ #1.5  2x10        Hip ADD ball squeeze              BKFO  GTB 3x5ea GTB  4x5 ea GTB 4x5 ea  GTB  4x5ea        Standing TKEs BTB  R 2x10 [place] 10# 2x10            Standing hip ABD, Ext              Standing Marches Alt  10x2 ea             Tandem amb  @ mirror x3 laps Balance  30\"x2 ea           Wobble board 2' f/b 2' f/b 2' " "ea 2' ea          SLS   20\"x2 ea 2x20\"                        Ther Ex    3/8  3/15        FLAQUITO stretch              HS stretch              Heel slides with strap              DKTC with ball              Heel Raises      x20        Side-stepping X3 laps X3 laps X3 laps           Weight shifting 20x ea                                         Ther Activity    3/8  3/15        Bike for LE mobility Seat 9 uprt  x5' Seat 9 uprt  x5' Seat  9 uprt  x5' 5', seat 9 Recumb seat 19 x5min Uprigt  Seat 9  5'        Gait Training W/  SPC W/ SPC t/o            Step ups   4\" 10x 4\" 3x5  4\" 4x5                                    Pt Ed HEP    POC         Re-Evaluation     DK         Modalities              Heat/ice (PRN)                                       "

## 2024-03-18 ENCOUNTER — OFFICE VISIT (OUTPATIENT)
Dept: PHYSICAL THERAPY | Facility: CLINIC | Age: 69
End: 2024-03-18
Payer: COMMERCIAL

## 2024-03-18 DIAGNOSIS — Z98.890 STATUS POST OPEN REDUCTION AND INTERNAL FIXATION (ORIF) OF FRACTURE: ICD-10-CM

## 2024-03-18 DIAGNOSIS — Z87.81 STATUS POST OPEN REDUCTION AND INTERNAL FIXATION (ORIF) OF FRACTURE: ICD-10-CM

## 2024-03-18 DIAGNOSIS — S72.144D NONDISPLACED INTERTROCHANTERIC FRACTURE OF RIGHT FEMUR, SUBSEQUENT ENCOUNTER FOR CLOSED FRACTURE WITH ROUTINE HEALING: Primary | ICD-10-CM

## 2024-03-18 PROCEDURE — 97110 THERAPEUTIC EXERCISES: CPT

## 2024-03-18 PROCEDURE — 97140 MANUAL THERAPY 1/> REGIONS: CPT

## 2024-03-18 PROCEDURE — 97530 THERAPEUTIC ACTIVITIES: CPT

## 2024-03-18 NOTE — PROGRESS NOTES
"Daily Note     Today's date: 3/18/2024  Patient name: Kemal Huddleston  : 1955  MRN: [id number]  Referring provider: Frandy Vaca DO  Dx:   Encounter Diagnosis     ICD-10-CM    1. Nondisplaced intertrochanteric fracture of right femur, subsequent encounter for closed fracture with routine healing  S72.144D       2. Status post open reduction and internal fixation (ORIF) of fracture  Z98.890     Z87.81                      Subjective: Pt states that he is feeling pretty good, just mostly has some knee pain and stiffness.      Objective: See treatment diary below      Assessment: Tolerated treatment well.  Continued with outlined program and progressed as tolerated.  Pt challenged with hip flexion in standing due to weakness.  He has improved tolerance to step ups and uses no UE support.  Pt has most difficulty with SLS and requires one UE support.  Pt progressing slowly towards his goals and will benefit from continued therapy.      Plan: Continue per plan of care.      Diagnosis: s/p Right trochanteric fx with ORIF (DOS: 2024)  Precautions: WBAT, HTN  ( * asterisk indicates given per HEP)  Next Physician Appointment:   Cristofer HEP:     Manuals 2/26 3/1 3/4 3/8 3/11 3/15 3/18       STM  DK TH           PROM R hip and knee TH DK TH LH DK JM        HS stretch                                          Neuro Re-Ed    3/8  3/15        Bridges over physioball              Quad Sets              LAQs/ SAQs LAQ  1.5# 20x   LAQ 1.5# 20x  SAQ #1.5  2x10    LAQ #1.5  2x10 LAQ  2#  2x10       Hip ADD ball squeeze              BKFO  GTB 3x5ea GTB  4x5 ea GTB 4x5 ea  GTB  4x5ea GTB  4x5 ea       Standing TKEs BTB  R 2x10 [place] 10# 2x10            Standing hip ABD, Ext              Standing Marches Alt  10x2 ea      Alt  102 ea       Tandem amb  @ mirror x3 laps Balance  30\"x2 ea           Wobble board 2' f/b 2' f/b 2' ea 2' ea   2' ea       SLS   20\"x2 ea 2x20\"   3x20\" ea       Mini squat       10x       Ther Ex    " "3/8  3/15        FLAQUITO stretch              HS stretch              Heel slides with strap              DKTC with ball              Heel Raises      x20        Side-stepping X3 laps X3 laps X3 laps    GTB 2 laps       Weight shifting 20x ea                                         Ther Activity    3/8  3/15        Bike for LE mobility Seat 9 uprt  x5' Seat 9 uprt  x5' Seat  9 uprt  x5' 5', seat 9 Recumb seat 19 x5min Uprigt  Seat 9  5' Upright seat 9  5'       Gait Training W/  SPC W/ SPC t/o            Step ups   4\" 10x 4\" 3x5  4\" 4x5 4\" 4x5                                   Pt Ed HEP    POC         Re-Evaluation     DK         Modalities              Heat/ice (PRN)                                         "

## 2024-03-22 ENCOUNTER — OFFICE VISIT (OUTPATIENT)
Dept: PHYSICAL THERAPY | Facility: CLINIC | Age: 69
End: 2024-03-22
Payer: COMMERCIAL

## 2024-03-22 DIAGNOSIS — Z98.890 STATUS POST OPEN REDUCTION AND INTERNAL FIXATION (ORIF) OF FRACTURE: ICD-10-CM

## 2024-03-22 DIAGNOSIS — Z87.81 STATUS POST OPEN REDUCTION AND INTERNAL FIXATION (ORIF) OF FRACTURE: ICD-10-CM

## 2024-03-22 DIAGNOSIS — S72.144D NONDISPLACED INTERTROCHANTERIC FRACTURE OF RIGHT FEMUR, SUBSEQUENT ENCOUNTER FOR CLOSED FRACTURE WITH ROUTINE HEALING: Primary | ICD-10-CM

## 2024-03-22 PROCEDURE — 97110 THERAPEUTIC EXERCISES: CPT

## 2024-03-22 PROCEDURE — 97530 THERAPEUTIC ACTIVITIES: CPT

## 2024-03-22 NOTE — PROGRESS NOTES
Daily Note     Today's date: 3/22/2024  Patient name: Kemal Huddleston  : 1955  MRN: [id number]  Referring provider: Frandy Vaca DO  Dx:   Encounter Diagnosis     ICD-10-CM    1. Nondisplaced intertrochanteric fracture of right femur, subsequent encounter for closed fracture with routine healing  S72.144D       2. Status post open reduction and internal fixation (ORIF) of fracture  Z98.890     Z87.81             Start Time: 0900  Stop Time: 945  Total time in clinic (min): 45 minutes    Subjective: Patient reports R LE seems to be getting better each and every day and notes improvements after attending PT session. Patient notes that he still struggles with balance and stability and that his goal is to feel good enough to play golf again this spring since there is a lot of uneven surfaces on the course.      Objective: See treatment diary below      Assessment: Tolerated treatment well. Patient did not experience any onset of symptoms throughout today's session. Patient displayed proper form and had a good recall/understanding of all exercises in his program. Patient remains moderately challenge with SLS exercise but was able to self correct any episodes of instability by maintaining his center of balance throughout exercise. Patient still required HHA during SLS, but was able to tolerate bouts of time where he let go of hand rail and balanced without needing any external assistance. Continue to progress patient as able. Muscle fatigue present at the conclusion of session. Patient would benefit from continued PT.     Plan: Continue per plan of care.      Diagnosis: s/p Right trochanteric fx with ORIF (DOS: 2024)  Precautions: WBAT, HTN  ( * asterisk indicates given per HEP)  Next Physician Appointment:   Cristofer HEP:     Manuals 2/26 3/1 3/4 3/8 3/11 3/15 3/18 3/22      STM  DK TH           PROM R hip and knee TH DK TH LH DK JM        HS stretch                                          Neuro Re-Ed    " 3/8  3/15        Bridges over physioball              Quad Sets              LAQs/ SAQs LAQ  1.5# 20x   LAQ 1.5# 20x  SAQ #1.5  2x10    LAQ #1.5  2x10 LAQ  2#  2x10 LAQ 2# 2x10      Hip ADD ball squeeze              BKFO  GTB 3x5ea GTB  4x5 ea GTB 4x5 ea  GTB  4x5ea GTB  4x5 ea GTB 4x5 ea      Standing TKEs BTB  R 2x10 [place] 10# 2x10            Standing hip ABD, Ext              Standing Marches Alt  10x2 ea      Alt  102 ea Alt 10x2      Tandem amb  @ mirror x3 laps Balance  30\"x2 ea           Wobble board 2' f/b 2' f/b 2' ea 2' ea   2' ea 2' ea      SLS   20\"x2 ea 2x20\"   3x20\" ea 3x20\" ea      Mini squat       10x 10x      Ther Ex    3/8  3/15        FLAQUITO stretch              HS stretch              Heel slides with strap              DKTC with ball              Heel Raises      x20        Side-stepping X3 laps X3 laps X3 laps    GTB 2 laps GTB 2 laps      Weight shifting 20x ea                                         Ther Activity    3/8  3/15        Bike for LE mobility Seat 9 uprt  x5' Seat 9 uprt  x5' Seat  9 uprt  x5' 5', seat 9 Recumb seat 19 x5min Uprigt  Seat 9  5' Upright seat 9  5' Upright seat 9  5'      Gait Training W/  SPC W/ SPC t/o            Step ups   4\" 10x 4\" 3x5  4\" 4x5 4\" 4x5 4\" 4x5                                  Pt Ed HEP    POC         Re-Evaluation     DK         Modalities              Heat/ice (PRN)                                         "

## 2024-03-25 ENCOUNTER — OFFICE VISIT (OUTPATIENT)
Dept: PHYSICAL THERAPY | Facility: CLINIC | Age: 69
End: 2024-03-25
Payer: COMMERCIAL

## 2024-03-25 DIAGNOSIS — Z98.890 STATUS POST OPEN REDUCTION AND INTERNAL FIXATION (ORIF) OF FRACTURE: ICD-10-CM

## 2024-03-25 DIAGNOSIS — S72.144D NONDISPLACED INTERTROCHANTERIC FRACTURE OF RIGHT FEMUR, SUBSEQUENT ENCOUNTER FOR CLOSED FRACTURE WITH ROUTINE HEALING: Primary | ICD-10-CM

## 2024-03-25 DIAGNOSIS — Z87.81 STATUS POST OPEN REDUCTION AND INTERNAL FIXATION (ORIF) OF FRACTURE: ICD-10-CM

## 2024-03-25 PROCEDURE — 97112 NEUROMUSCULAR REEDUCATION: CPT | Performed by: PHYSICAL THERAPIST

## 2024-03-25 PROCEDURE — 97530 THERAPEUTIC ACTIVITIES: CPT | Performed by: PHYSICAL THERAPIST

## 2024-03-25 NOTE — PROGRESS NOTES
Daily Note     Today's date: 3/25/2024  Patient name: Kemal Huddleston  : 1955  MRN: [id number]  Referring provider: Frandy Vaca DO  Dx:   Encounter Diagnosis     ICD-10-CM    1. Nondisplaced intertrochanteric fracture of right femur, subsequent encounter for closed fracture with routine healing  S72.144D       2. Status post open reduction and internal fixation (ORIF) of fracture  Z98.890     Z87.81           Start Time: 830  Stop Time: 915  Total time in clinic (min): 45 minutes    Subjective: Patient reports improved overall pain levels and movement of Right leg. He continues to have some trouble in his knees with stairs.       Objective: See treatment diary below      Assessment: Tolerated treatment well. Patient continues to have some difficulty with Right LE weight-shifting. Improved ability with step ups to 4inch, but requires Left UE to perform confidently. Added step downs from 4inch step to work on eccentric quad control. Cuing to avoid compensations and for smooth control. Patient noted to have improved ability with maintaining hook-lying position without pain. Improved transitional movements and bed mobility. Patient exhibited good technique with therapeutic exercises and would benefit from continued PT      Plan: Continue per plan of care.  Progress treatment as tolerated.       Diagnosis: s/p Right trochanteric fx with ORIF (DOS: 2024)  Precautions: WBAT, HTN  ( * asterisk indicates given per HEP)  Next Physician Appointment:   Cristofer HEP:     Manuals 3/4 3/8 3/11 3/15 3/18 3/22 3/25         STM TH               PROM R hip and knee TH LH DK JM            HS stretch                                                Neuro Re-Ed  3/8  3/15            LAQs/ SAQs  LAQ 1.5# 20x  SAQ #1.5  2x10    LAQ #1.5  2x10 LAQ  2#  2x10 LAQ 2# 2x10 LAQ 4# 2x10         Hip ADD ball squeeze                Bridges       Standard 2x10         BKFO GTB  4x5 ea GTB 4x5 ea  GTB  4x5ea GTB  4x5 ea GTB 4x5 ea  "GTB 4x5 ea         Standing TKEs                Standing hip ABD, Ext       X20ea light UE touch         Standing Marches     Alt  102 ea Alt 10x2          Tandem amb Balance  30\"x2 ea      FWD amb floor x3 laps         Wobble board 2' ea 2' ea   2' ea 2' ea 2' ea         SLS 20\"x2 ea 2x20\"   3x20\" ea 3x20\" ea With hip ABD, Ext         Mini squat     10x 10x          Ther Ex  3/8  3/15            FLAQUITO stretch                HS stretch                Heel slides with strap                DKTC with ball                Heel Raises    x20            Side-stepping X3 laps    GTB 2 laps GTB 2 laps          Weight shifting                                                Ther Activity  3/8  3/15            Bike for LE mobility Seat  9 uprt  x5' 5', seat 9 Recumb seat 19 x5min Uprigt  Seat 9  5' Upright seat 9  5' Upright seat 9  5' Upright seat 9  5'         Gait Training                Step ups 4\" 10x 4\" 3x5  4\" 4x5 4\" 4x5 4\" 4x5 Up/down 4\" 4x5 cues                                         Pt Ed   POC             Re-Evaluation   DK             Modalities                Heat/ice (PRN)                                               "

## 2024-03-29 ENCOUNTER — OFFICE VISIT (OUTPATIENT)
Dept: PHYSICAL THERAPY | Facility: CLINIC | Age: 69
End: 2024-03-29
Payer: COMMERCIAL

## 2024-03-29 DIAGNOSIS — Z87.81 STATUS POST OPEN REDUCTION AND INTERNAL FIXATION (ORIF) OF FRACTURE: ICD-10-CM

## 2024-03-29 DIAGNOSIS — Z98.890 STATUS POST OPEN REDUCTION AND INTERNAL FIXATION (ORIF) OF FRACTURE: ICD-10-CM

## 2024-03-29 DIAGNOSIS — S72.144D NONDISPLACED INTERTROCHANTERIC FRACTURE OF RIGHT FEMUR, SUBSEQUENT ENCOUNTER FOR CLOSED FRACTURE WITH ROUTINE HEALING: Primary | ICD-10-CM

## 2024-03-29 PROCEDURE — 97110 THERAPEUTIC EXERCISES: CPT | Performed by: PHYSICAL THERAPIST

## 2024-03-29 PROCEDURE — 97530 THERAPEUTIC ACTIVITIES: CPT | Performed by: PHYSICAL THERAPIST

## 2024-03-29 PROCEDURE — 97112 NEUROMUSCULAR REEDUCATION: CPT | Performed by: PHYSICAL THERAPIST

## 2024-03-29 NOTE — PROGRESS NOTES
"Daily Note     Today's date: 3/29/2024  Patient name: Kemal Huddleston  : 1955  MRN: [id number]  Referring provider: Frandy Vaca DO  Dx:   Encounter Diagnosis     ICD-10-CM    1. Nondisplaced intertrochanteric fracture of right femur, subsequent encounter for closed fracture with routine healing  S72.144D       2. Status post open reduction and internal fixation (ORIF) of fracture  Z98.890     Z87.81           Start Time: 830  Stop Time: 915  Total time in clinic (min): 45 minutes    Subjective: Patient reports improvements overall. He is able to do more household chores, with some modifications to \"not overdo it\".      Objective: See treatment diary below      Assessment: Tolerated treatment well. Patient was challenged with stepping over hurdles with cuing required to avoid circumduction. Step-to pattern performed due to some difficulty with Right LE weight-shifting, especially with Right UE support vs Left on railing. Progress step ups and downs to 6inch step. Difficulty with Right LE step ups with Right UE support, otherwise improved motor control and form. Emphasized importance of HEP. Patient exhibited good technique with therapeutic exercises and would benefit from continued PT      Plan: Continue per plan of care.  Progress treatment as tolerated.       Diagnosis: s/p Right trochanteric fx with ORIF (DOS: 2024)  Precautions: WBAT, HTN  ( * asterisk indicates given per HEP)  Next Physician Appointment:   Cristofer HEP:     Manuals 3/11 3/15 3/18 3/22 3/25 3/29          STM                PROM R hip and knee DK JM              HS stretch                                                Neuro Re-Ed  3/15              LAQs/ SAQs  SAQ #1.5  2x10    LAQ #1.5  2x10 LAQ  2#  2x10 LAQ 2# 2x10 LAQ 4# 2x10           Hip ADD ball squeeze                Bridges     Standard 2x10 Standard 2x10          BKFO  GTB  4x5ea GTB  4x5 ea GTB 4x5 ea GTB 4x5 ea GTB 4x5 ea          Standing TKEs              " "  Standing hip ABD, Ext     X20ea light UE touch X20ea light UE touch          Standing Marches   Alt  102 ea Alt 10x2  Seated x15ea          Tandem amb     FWD amb floor x3 laps FWD amb floor x3 laps          Wobble board   2' ea 2' ea 2' ea           SLS   3x20\" ea 3x20\" ea With hip ABD, Ext With hip ABD, Ext          Mini squat   10x 10x            Ther Ex  3/15              FLAQUITO stretch                HS stretch                Heel slides with strap                DKTC with ball                Heel Raises  x20              Side-stepping   GTB 2 laps GTB 2 laps  GTB 2 laps          Weight shifting                                                Ther Activity  3/15              Bike for LE mobility Recumb seat 19 x5min Uprigt  Seat 9  5' Upright seat 9  5' Upright seat 9  5' Upright seat 9  5' Upright seat 9  5'          Stepping over hurdles      FWD step-to x3 laps          Step ups  4\" 4x5 4\" 4x5 4\" 4x5 Up/down 4\" 4x5 cues Up/down 6\" x15 cues                                          Pt Ed POC               Re-Evaluation DK               Modalities                Heat/ice (PRN)                                                 "

## 2024-04-01 ENCOUNTER — OFFICE VISIT (OUTPATIENT)
Dept: PHYSICAL THERAPY | Facility: CLINIC | Age: 69
End: 2024-04-01
Payer: COMMERCIAL

## 2024-04-01 DIAGNOSIS — S72.144D NONDISPLACED INTERTROCHANTERIC FRACTURE OF RIGHT FEMUR, SUBSEQUENT ENCOUNTER FOR CLOSED FRACTURE WITH ROUTINE HEALING: Primary | ICD-10-CM

## 2024-04-01 DIAGNOSIS — Z87.81 STATUS POST OPEN REDUCTION AND INTERNAL FIXATION (ORIF) OF FRACTURE: ICD-10-CM

## 2024-04-01 DIAGNOSIS — Z98.890 STATUS POST OPEN REDUCTION AND INTERNAL FIXATION (ORIF) OF FRACTURE: ICD-10-CM

## 2024-04-01 PROCEDURE — 97530 THERAPEUTIC ACTIVITIES: CPT

## 2024-04-01 PROCEDURE — 97112 NEUROMUSCULAR REEDUCATION: CPT

## 2024-04-01 NOTE — PROGRESS NOTES
Daily Note     Today's date: 2024  Patient name: Kemal Huddleston  : 1955  MRN: [id number]  Referring provider: Frandy Vaca DO  Dx:   Encounter Diagnosis     ICD-10-CM    1. Nondisplaced intertrochanteric fracture of right femur, subsequent encounter for closed fracture with routine healing  S72.144D       2. Status post open reduction and internal fixation (ORIF) of fracture  Z98.890     Z87.81                      Subjective: Pt states that he is doing well.  He continues to be most challenged with stairs, but isn't sure if it is more his knee.        Objective: See treatment diary below      Assessment: Tolerated treatment well.  Focused on improving pt's hip strength to improve functional tasks like stairs.  Pt challenged and does require UE support to maintain his balance.  STS added this visit to improve overall LE strength and to improve this functional movement.  Pt requires encouragement to avoid using UE however he is able to perform STS with 1 and 2 foam without UE support.  Muscle fatigue is noted, no reports of increased knee or hip pain.  Practiced golf swing/stance with good tolerance and no LOB.  Pt progressing slowly towards his goals and will benefit from continued therapy.      Plan: Continue per plan of care.      Diagnosis: s/p Right trochanteric fx with ORIF (DOS: 2024)  Precautions: WBAT, HTN  ( * asterisk indicates given per HEP)  Next Physician Appointment:   Cristofer HEP:     Manuals 3/11 3/15 3/18 3/22 3/25 3/29 4/1         STM                PROM R hip and knee DK JM              HS stretch                                                Neuro Re-Ed  3/15              LAQs/ SAQs  SAQ #1.5  2x10    LAQ #1.5  2x10 LAQ  2#  2x10 LAQ 2# 2x10 LAQ 4# 2x10           Hip ADD ball squeeze                Bridges     Standard 2x10 Standard 2x10          BKFO  GTB  4x5ea GTB  4x5 ea GTB 4x5 ea GTB 4x5 ea GTB 4x5 ea          Standing TKEs                Standing hip ABD, Ext      "X20ea light UE touch X20ea light UE touch          Standing Marches   Alt  102 ea Alt 10x2  Seated x15ea          Tandem amb     FWD amb floor x3 laps FWD amb floor x3 laps          Wobble board   2' ea 2' ea 2' ea  2' ea         SLS   3x20\" ea 3x20\" ea With hip ABD, Ext With hip ABD, Ext 3x20\" ea         Mini squat   10x 10x            Ther Ex  3/15              FLAQUITO stretch                HS stretch                Heel slides with strap                DKTC with ball                Heel Raises  x20              Side-stepping   GTB 2 laps GTB 2 laps  GTB 2 laps          Weight shifting                                                Ther Activity  3/15              Bike for LE mobility Recumb seat 19 x5min Uprigt  Seat 9  5' Upright seat 9  5' Upright seat 9  5' Upright seat 9  5' Upright seat 9  5' Upright  Seat 9  5'         Stepping over hurdles      FWD step-to x3 laps          Step ups  4\" 4x5 4\" 4x5 4\" 4x5 Up/down 4\" 4x5 cues Up/down 6\" x15 cues Up/down  6\"x20x         Golf swing/balance       3'         STS       2 foam 10x: 1 foam 5x         Taps to cone       10x         Pt Ed POC      HEP         Re-Evaluation DK               Modalities                Heat/ice (PRN)                                                   "

## 2024-04-05 ENCOUNTER — OFFICE VISIT (OUTPATIENT)
Dept: PHYSICAL THERAPY | Facility: CLINIC | Age: 69
End: 2024-04-05
Payer: COMMERCIAL

## 2024-04-05 DIAGNOSIS — Z98.890 STATUS POST OPEN REDUCTION AND INTERNAL FIXATION (ORIF) OF FRACTURE: ICD-10-CM

## 2024-04-05 DIAGNOSIS — S72.144D NONDISPLACED INTERTROCHANTERIC FRACTURE OF RIGHT FEMUR, SUBSEQUENT ENCOUNTER FOR CLOSED FRACTURE WITH ROUTINE HEALING: Primary | ICD-10-CM

## 2024-04-05 DIAGNOSIS — Z87.81 STATUS POST OPEN REDUCTION AND INTERNAL FIXATION (ORIF) OF FRACTURE: ICD-10-CM

## 2024-04-05 PROCEDURE — 97112 NEUROMUSCULAR REEDUCATION: CPT | Performed by: PHYSICAL THERAPIST

## 2024-04-05 PROCEDURE — 97530 THERAPEUTIC ACTIVITIES: CPT | Performed by: PHYSICAL THERAPIST

## 2024-04-05 NOTE — PROGRESS NOTES
Daily Note     Today's date: 2024  Patient name: Kemal Huddleston  : 1955  MRN: [id number]  Referring provider: Frandy Vaca DO  Dx:   Encounter Diagnosis     ICD-10-CM    1. Nondisplaced intertrochanteric fracture of right femur, subsequent encounter for closed fracture with routine healing  S72.144D       2. Status post open reduction and internal fixation (ORIF) of fracture  Z98.890     Z87.81           Start Time: 830  Stop Time: 915  Total time in clinic (min): 45 minutes    Subjective: Patient reports he is feeling a little better, he reports he is sleeping better.      Objective: See treatment diary below      Assessment: Tolerated treatment well. Discussed POC with PT at length. Discussed with patient possible continuation of PT to progress functional strength training and control. Despite PT recommendations, patient continued to insist he would like to follow-up with surgeon prior to making further appointments after next week with PT. Patient was challenged with tandem walking on foam surface, but improved with cuing and repetitions. He continues to have difficulty with Right LE step ups with Right UE support, however improved eccentric control and lowering. Performed supine SLR with some difficulty initiating, however improved once cued for TA contraction. Patient exhibited good technique with therapeutic exercises and would benefit from continued PT      Plan: Continue per plan of care.  Progress treatment as tolerated.       Diagnosis: s/p Right trochanteric fx with ORIF (DOS: 2024)  Precautions: WBAT, HTN  ( * asterisk indicates given per HEP)  Next Physician Appointment:   Cristofer HEP:     Manuals 3/18 3/22 3/25 3/29 4/1 4/4           STM                 PROM R hip and knee                 HS stretch                                                   Neuro Re-Ed                 LAQs/ SAQs LAQ  2#  2x10 LAQ 2# 2x10 LAQ 4# 2x10              Hip ADD ball squeeze                "  Bridges   Standard 2x10 Standard 2x10  Standard x25           BKFO GTB  4x5 ea GTB 4x5 ea GTB 4x5 ea GTB 4x5 ea             Supine SLR w/ TA      + QS x10 cues           Standing TKEs                 Standing hip ABD, Ext   X20ea light UE touch X20ea light UE touch             Standing Marches Alt  102 ea Alt 10x2  Seated x15ea             Tandem amb   FWD amb floor x3 laps FWD amb floor x3 laps  FWD amb foam x3 laps           Wobble board 2' ea 2' ea 2' ea  2' ea 2' ea           SLS 3x20\" ea 3x20\" ea With hip ABD, Ext With hip ABD, Ext 3x20\" ea                                              Ther Ex                 FLAQUITO stretch                 HS stretch                 Heel slides with strap                 DKTC with ball                 Heel Raises                 Side-stepping GTB 2 laps GTB 2 laps  GTB 2 laps                                               Ther Activity                 Bike for LE mobility Upright seat 9  5' Upright seat 9  5' Upright seat 9  5' Upright seat 9  5' Upright  Seat 9  5' Upright  Seat 9  5'           Stepping over hurdles    FWD step-to x3 laps  FWD step-to x3 laps           Step ups 4\" 4x5 4\" 4x5 Up/down 4\" 4x5 cues Up/down 6\" x15 cues Up/down  6\"x20x Up/down  6\"x20x           Golf swing/balance     3'            STS     2 foam 10x: 1 foam 5x            Taps to cone     10x            Mini Squats 10x 10x    Mirror 2x10 cues           Pt Ed     HEP POC           Re-Evaluation                 Modalities                 Heat/ice (PRN)                                                       "

## 2024-04-08 ENCOUNTER — OFFICE VISIT (OUTPATIENT)
Dept: OBGYN CLINIC | Facility: CLINIC | Age: 69
End: 2024-04-08

## 2024-04-08 ENCOUNTER — APPOINTMENT (OUTPATIENT)
Dept: RADIOLOGY | Facility: AMBULARY SURGERY CENTER | Age: 69
End: 2024-04-08
Attending: STUDENT IN AN ORGANIZED HEALTH CARE EDUCATION/TRAINING PROGRAM
Payer: COMMERCIAL

## 2024-04-08 ENCOUNTER — EVALUATION (OUTPATIENT)
Dept: PHYSICAL THERAPY | Facility: CLINIC | Age: 69
End: 2024-04-08
Payer: COMMERCIAL

## 2024-04-08 VITALS — BODY MASS INDEX: 40.74 KG/M2 | HEIGHT: 71 IN | WEIGHT: 291 LBS

## 2024-04-08 DIAGNOSIS — Z87.81 STATUS POST OPEN REDUCTION AND INTERNAL FIXATION (ORIF) OF FRACTURE: ICD-10-CM

## 2024-04-08 DIAGNOSIS — S72.144D NONDISPLACED INTERTROCHANTERIC FRACTURE OF RIGHT FEMUR, SUBSEQUENT ENCOUNTER FOR CLOSED FRACTURE WITH ROUTINE HEALING: Primary | ICD-10-CM

## 2024-04-08 DIAGNOSIS — Z98.890 STATUS POST OPEN REDUCTION AND INTERNAL FIXATION (ORIF) OF FRACTURE: ICD-10-CM

## 2024-04-08 DIAGNOSIS — Z87.81 S/P RIGHT HIP FRACTURE: Primary | ICD-10-CM

## 2024-04-08 PROCEDURE — 97110 THERAPEUTIC EXERCISES: CPT | Performed by: PHYSICAL THERAPIST

## 2024-04-08 PROCEDURE — 99024 POSTOP FOLLOW-UP VISIT: CPT | Performed by: STUDENT IN AN ORGANIZED HEALTH CARE EDUCATION/TRAINING PROGRAM

## 2024-04-08 PROCEDURE — 97530 THERAPEUTIC ACTIVITIES: CPT | Performed by: PHYSICAL THERAPIST

## 2024-04-08 PROCEDURE — 73502 X-RAY EXAM HIP UNI 2-3 VIEWS: CPT

## 2024-04-08 NOTE — PROGRESS NOTES
Orthopaedic Surgery - Office Note  Kemal Huddleston (68 y.o. male)   : 1955   MRN: [id number]  Encounter Date: 2024    Chief Complaint   Patient presents with    Right Hip - Post-op     Past Surgical History:   Procedure Laterality Date    COLONOSCOPY  2015    DE OPTX FEM SHFT FX W/INSJ IMED IMPLT W/WO SCREW Right 2024    Procedure: INSERTION NAIL IM FEMUR ANTEGRADE (TROCHANTERIC), and all associated procedures;  Surgeon: Frandy Vaca DO;  Location: [facility];  Service: Orthopedics    TONSILLECTOMY      VASECTOMY       Assessment / Plan  S/p insertion right femoral intramedullary nail, DOS: 24  Right peritrochanteric femur fracture    X-rays reviewed and discussed with patient revealing: maintained alignment of patient's previous peritrochanteric fracture with no signs of hardware failure or loosening.  Oval fracture healing with increase in callus formation  Patient to continue to be weightbearing as tolerated to the right lower extremity  No longer requires DVT ppx  Range of motion as tolerated to right lower extremity  Patient to continue PT as needed  Patient to continue at home analgesic medication with Tylenol  Patient to follow-up in 3 months for repeat x-ray right hip and reevaluation    History of Present Illness  Kemal Huddleston is a 68 y.o. male who presents 2 weeks s/p S/p insertion right femoral intramedullary nail, DOS: 24. He presents today using a walker to assist with ambulation. He states he is doing well and denies any significant pain of the right lower extremity. He takes Tylenol for pain relief. He states overall he is doing well. He continues to work with at home PT.  He denies any numbness, tingling or paresthesias.     Interval history 2024: Kemal is here today for follow up 8 weeks s/p right femoral cephalomedullary nail. He is doing well with minimal pain. He has been progressing with PT and no longer uses a walker. He uses a cane. He has been  "completed ASA for dvt ppx. He denies any numbness, tingling, or paresthesias.     Interval history 4/8/2024:  The patient presents 3 months s/p right trochanteric femur IM nail insertion, 1/11/2024.  He is doing well.  Today he has no right hip/thigh pain complaints.  He continues to participate in physical therapy with benefit.  He does use SPC et does small distances without.  He denies any numbness, tingling or paresthesias.     Review of Systems  Pertinent items are noted in HPI.  All other systems were reviewed and are negative.    Physical Exam  Ht 5' 11\" (1.803 m)   Wt 132 kg (291 lb)   BMI 40.59 kg/m²   Cons: Appears well.  No apparent distress.  Psych: Alert. Oriented x3.  Mood and affect normal.  Eyes: PERRLA, EOMI  Resp: Normal effort.  No audible wheezing or stridor.  CV: Palpable pulse.  No discernable arrhythmia.    Lymph:  No palpable cervical, axillary, or inguinal lymphadenopathy.  Skin: Warm.  No palpable masses.  No visible lesions.  Neuro: Normal muscle tone.  Normal and symmetric DTR's.     Right lower extremity   The right  lower extremity was exposed and inspected. Surgical incisions are well healed without drainage or signs of dehiscence. All other visible skin intact without erythema, ecchymosis, effusion or obvious osseous deformity. No tenderness about the hip. Pt able to range hip from 0-110 degrees of flexion, 30 degrees to external rotation, and 10 degrees of internal rotation. Sensation intact to superficial peroneal, deep peroneal, sural, saphenous, plantar nerve distributions. Motor intact to extensor hallux longus, tibialis anterior, gastrocnemius muscles, extensor mechanism intact. Limb is well perfused. Brisk capillary refill in all 5 digits. Compartments soft and compressible.     Studies Reviewed  X-rays right femur reveal maintained alignment of patient's previous peritrochanteric fracture with no signs of hardware failure or loosening.  The fracture is healing with callus " formation      Procedures  none    Medical, Surgical, Family, and Social History  The patient's medical history, family history, and social history, were reviewed and updated as appropriate.    Past Medical History:   Diagnosis Date    Hypertension            Family History   Problem Relation Age of Onset    Cancer Mother     Hypertension Father        Social History     Occupational History    Not on file   Tobacco Use    Smoking status: Never    Smokeless tobacco: Never   Vaping Use    Vaping status: Never Used   Substance and Sexual Activity    Alcohol use: Yes     Alcohol/week: 4.0 standard drinks of alcohol     Types: 1 Glasses of wine, 1 Cans of beer, 1 Shots of liquor, 1 Standard drinks or equivalent per week     Comment: occasional    Drug use: Never    Sexual activity: Not on file       No Known Allergies      Current Outpatient Medications:     amLODIPine (NORVASC) 5 mg tablet, TAKE 1 TABLET BY MOUTH DAILY, Disp: 100 tablet, Rfl: 2    olmesartan-hydrochlorothiazide (BENICAR HCT) 20-12.5 MG per tablet, TAKE 1 TABLET BY MOUTH DAILY, Disp: 100 tablet, Rfl: 2      Sudhir Evans    Scribe Attestation      I,:   am acting as a scribe while in the presence of the attending physician.:       I,:   personally performed the services described in this documentation    as scribed in my presence.:

## 2024-04-08 NOTE — PROGRESS NOTES
PT Re-Evaluation     Today's date: 2024  Patient name: Kemal Huddleston  : 1955  MRN: [id number]  Referring provider: Frandy Vaca DO  Dx:   Encounter Diagnosis     ICD-10-CM    1. Nondisplaced intertrochanteric fracture of right femur, subsequent encounter for closed fracture with routine healing  S72.144D       2. Status post open reduction and internal fixation (ORIF) of fracture  Z98.890     Z87.81             Start Time: 830  Stop Time: 915  Total time in clinic (min): 45 minutes    Assessment  Assessment details: Patient is a 68 y.o. male who presents to outpatient PT with s/p Right trochanteric fracture with ORIF performed on 2024. Patient is about 3 months post-op at this time, ambulating with single point cane for all distances. He reports walking very short distances at home without AD. Patient reports no issues getting shoes/socks on (uses assistive device) and is able to trim his toenails without difficulty. Patient reports improved ability with getting in/out of car, bed mobility, and other functional mobility tasks that require movement and Wbing of Right LE. Patient noted to have improving Right hip mobility at this time. He is able to perform a supine SLR without difficulty, compared to difficulty initiating due to weakness and pain in previous sessions. This is overall beneficial for better mechanics with walking, transfers, etc. He reports improvements in stair negotiation, however has difficulty at times due to Right knee pain. Patient's walking mechanics are steadily improving, however decreased Right weight-shifting primarily due to Right knee. He has minimal to no pain in Right hip with ADLs.  Patient has progressed well with PT interventions and exercises thus far. Patient will benefit from continued skilled PT services to further progress him towards his PLOF to be independent with daily functional tasks. Patient will be following up with surgeon today. Patient would like  to be discharged from PT at next visit, but will discuss further at his next appointment. Thank you for the referral.  Impairments: abnormal gait, impaired balance, weight-bearing intolerance and poor body mechanics  Functional limitations: stair negotiationUnderstanding of Dx/Px/POC: good   Prognosis: good    Goals  Impairment Goals 4-6 weeks   In order to maximize function patient will be able to...   - Decrease intensity/duration/frequency of pain to 4/10. Met  - Demonstrate symmetrical hip AROM without pain. Met  - Increase hip/LE strength to 4/5 throughout. Partially Met  - Demonstrate improved hip flexibility as demonstrated by increased ROM through therapeutic exercise. Met    Functional Goals 6-8 weeks  In order to return to prior level of function patient will be able to...   - Participate in ADL's/IADL's/sport specific activities with no greater than 2/10 pain.  Met   - Increase Functional Status Measure (FOTO) to: anticipated at discharge. Met  - Demonstrate independence and compliant with HEP. Met  - Demonstrate a squat and or sit to stand with good mechanics and eccentric control without pain/difficulty/compensation. Partially Met  - Demonstrate functional activities with good core and glute strength without compensation/pain/difficulty. Met   - Ascend and descend stairs without increased pain/compensation/difficulty and a reciprocal gait pattern. Partially Met  - Patient will be able to demonstrate good gait mechanics without compensations. Partially Met    Plan  Patient would benefit from: skilled PT  Planned modality interventions: cryotherapy  Other planned modality interventions: moist heat  Planned therapy interventions: joint mobilization, manual therapy, neuromuscular re-education, patient education, strengthening, stretching, therapeutic activities, therapeutic exercise, home exercise program, functional ROM exercises, Haywood taping, postural training, balance/weight bearing training, body  mechanics training, flexibility, IASTM, kinesiology taping, massage, nerve gliding and transfer training  Frequency: 1-2x/week.  Duration in weeks: 4  Treatment plan discussed with: patient, PTA and referring physician      Subjective Evaluation    History of Present Illness  Date of onset: 1/10/2024  Date of surgery: 2024  Mechanism of injury: surgery  Mechanism of injury: Kemal Huddleston is a 68 y.o. male who presents fell in his backyard on 1/10/2024. He went to the ER and x-ray revealed peritrochanteric femur fracture. He received surgery on 2024, with mateo and screws placed ORIF along lateral Right thigh. Patient had home PT for about 2-3 weeks and was discharged on . Patient last saw orthopedic on  and was recommended for PT at this time. Patient walking with rolling walker at this time WBAT. He reports minimal pain that has been managed with rest and aspirin as needed. He denies radicular symptoms or paresthesias.    Quality of life: good    Patient Goals  Patient goals for therapy: decreased pain, increased motion, improved balance, increased strength, independence with ADLs/IADLs and return to sport/leisure activities    Pain  Current pain ratin  At best pain ratin  At worst pain ratin  Location: Right leg/knee  Quality: dull ache  Relieving factors: ice and medications  Aggravating factors: sitting, standing, stair climbing, walking and lifting  Progression: improved    Social Support  Steps to enter house: yes  Stairs in house: yes   Lives in: multiple-level home  Lives with: spouse    Treatments  Current treatment: physical therapy  Discharged from (in last 30 days): home health care      Objective     Observations     Additional Observation Details  Incision Site: Right hip to knee / lateral thigh; healing well    Gait Assessment: with single point cane, WBAT, decreased Right LE Wbing/Weight-shifting with limp noted on Right LE; steady 2-point reciprocal gait  pattern    Palpation     Right   Muscle spasm in the gluteus medius, iliopsoas, piriformis and TFL.     Tenderness     Right Hip   No tenderness in the ASIS and greater trochanter.     Active Range of Motion   Left Hip   Normal active range of motion    Right Hip   Flexion: 95 degrees   Abduction: 25 degrees   External rotation (90/90): 45 degrees   Internal rotation (90/90): 35 degrees     Additional Active Range of Motion Details  Knee AROM: Right 2-114 deg with tightness; Left 0-114 deg with tightness    Passive Range of Motion   Left Hip   Normal passive range of motion    Right Hip   Flexion: 105 (SKTC) degrees   Abduction: 28 degrees   External rotation (90/90): 45 degrees   Internal rotation (90/90): 35 degrees     Additional Passive Range of Motion Details  Hamstring Flexibility: moderate tightness bilaterally at 75% of full PROM    Strength/Myotome Testing     Left Hip   Normal muscle strength    Right Hip   Planes of Motion   Flexion: 4  Extension: 3+  Abduction: 4  Adduction: 4  External rotation: 4  Internal rotation: 4    Additional Strength Details  Knee MMT: flex/ext Right 4/5, Left 4+/5    Ankle MMT: DF/PF Right 4/5, Left 4+/5    Tests     Additional Tests Details  Timed Up and Go: 14 sec with single point cane             Diagnosis: s/p Right trochanteric fx with ORIF (DOS: 1/11/2024)  Precautions: WBAT, HTN  ( * asterisk indicates given per HEP)  Next Physician Appointment:   Cristofer HEP:     Manuals 3/18 3/22 3/25 3/29 4/1 4/4 4/8          STM                 PROM R hip and knee                 HS stretch                                                   Neuro Re-Ed                 LAQs/ SAQs LAQ  2#  2x10 LAQ 2# 2x10 LAQ 4# 2x10              Hip ADD ball squeeze                 Bridges   Standard 2x10 Standard 2x10  Standard x25           BKFO GTB  4x5 ea GTB 4x5 ea GTB 4x5 ea GTB 4x5 ea             Supine SLR w/ TA      + QS x10 cues           Standing TKEs                 Standing hip ABD,  "Ext   X20ea light UE touch X20ea light UE touch             Standing Marches Alt  102 ea Alt 10x2  Seated x15ea             Tandem amb   FWD amb floor x3 laps FWD amb floor x3 laps  FWD amb foam x3 laps           Wobble board 2' ea 2' ea 2' ea  2' ea 2' ea           SLS 3x20\" ea 3x20\" ea With hip ABD, Ext With hip ABD, Ext 3x20\" ea                                              Ther Ex                 FLAQUITO stretch                 HS stretch                 Heel slides with strap                 DKTC with ball                 Heel Raises                 Side-stepping GTB 2 laps GTB 2 laps  GTB 2 laps                                               Ther Activity                 Bike for LE mobility Upright seat 9  5' Upright seat 9  5' Upright seat 9  5' Upright seat 9  5' Upright  Seat 9  5' Upright  Seat 9  5' Upright  Seat 9  5'          Stepping over hurdles    FWD step-to x3 laps  FWD step-to x3 laps           Step ups 4\" 4x5 4\" 4x5 Up/down 4\" 4x5 cues Up/down 6\" x15 cues Up/down  6\"x20x Up/down  6\"x20x           Golf swing/balance     3'            STS     2 foam 10x: 1 foam 5x            Taps to cone     10x            Mini Squats 10x 10x    Mirror 2x10 cues           Pt Ed     HEP POC POC          Re-Evaluation       DK          Modalities                 Heat/ice (PRN)                                           "

## 2024-04-12 ENCOUNTER — OFFICE VISIT (OUTPATIENT)
Dept: PHYSICAL THERAPY | Facility: CLINIC | Age: 69
End: 2024-04-12
Payer: COMMERCIAL

## 2024-04-12 DIAGNOSIS — Z87.81 STATUS POST OPEN REDUCTION AND INTERNAL FIXATION (ORIF) OF FRACTURE: ICD-10-CM

## 2024-04-12 DIAGNOSIS — S72.144D NONDISPLACED INTERTROCHANTERIC FRACTURE OF RIGHT FEMUR, SUBSEQUENT ENCOUNTER FOR CLOSED FRACTURE WITH ROUTINE HEALING: Primary | ICD-10-CM

## 2024-04-12 DIAGNOSIS — Z98.890 STATUS POST OPEN REDUCTION AND INTERNAL FIXATION (ORIF) OF FRACTURE: ICD-10-CM

## 2024-04-12 PROCEDURE — 97112 NEUROMUSCULAR REEDUCATION: CPT | Performed by: PHYSICAL THERAPIST

## 2024-04-12 PROCEDURE — 97530 THERAPEUTIC ACTIVITIES: CPT | Performed by: PHYSICAL THERAPIST

## 2024-04-12 NOTE — PROGRESS NOTES
Discharge Summary    Today's date: 2024  Patient name: Kemal Huddleston  : 1955  MRN: [id number]  Referring provider: Frandy Vaca DO  Dx:   Encounter Diagnosis     ICD-10-CM    1. Nondisplaced intertrochanteric fracture of right femur, subsequent encounter for closed fracture with routine healing  S72.144D       2. Status post open reduction and internal fixation (ORIF) of fracture  Z98.890     Z87.81           Start Time: 830  Stop Time: 915  Total time in clinic (min): 45 minutes    Subjective: Patient reports he saw physician and reports surgery healing well. Patient reports he would like to be discharged at this time.      Objective: See treatment diary below. See last re-evaluation for most updated objective measurements.      Assessment: Tolerated treatment well. Patient exhibited good technique with therapeutic exercises. Patient noted to have good awareness of balance and quad contraction with exercises. Improved quad set with supine SLR compared unable to initiate on IE. Patient will be discharged at this time to continue exercises at home to [facility]. Patient was advised to contact this office with issues or concerns in the future.       Plan:  Discharge to [facility]     Diagnosis: s/p Right trochanteric fx with ORIF (DOS: 2024)  Precautions: WBAT, HTN  ( * asterisk indicates given per HEP)  Next Physician Appointment:   Cristofer HEP:     Manuals 3/18 3/22 3/25 3/29 4/1 4/4 4/8 4/12    STM            PROM R hip and knee            HS stretch                                    Neuro Re-Ed            LAQs/ SAQs LAQ  2#  2x10 LAQ 2# 2x10 LAQ 4# 2x10         Hip ADD ball squeeze            Bridges   Standard 2x10 Standard 2x10  Standard x25  Standard x20    BKFO GTB  4x5 ea GTB 4x5 ea GTB 4x5 ea GTB 4x5 ea        Supine SLR w/ TA      + QS x10 cues  + QS x10 cues    Standing TKEs            Standing hip ABD, Ext   X20ea light UE touch X20ea light UE touch        Standing Marches Alt  102 ea Alt  "10x2  Seated x15ea        Tandem amb   FWD amb floor x3 laps FWD amb floor x3 laps  FWD amb foam x3 laps      Wobble board 2' ea 2' ea 2' ea  2' ea 2' ea  2' ea    SLS 3x20\" ea 3x20\" ea With hip ABD, Ext With hip ABD, Ext 3x20\" ea                               Ther Ex            FLAQUITO stretch            HS stretch            Heel slides with strap            DKTC with ball            Heel Raises            Side-stepping GTB 2 laps GTB 2 laps  GTB 2 laps                                Ther Activity            Bike for LE mobility Upright seat 9  5' Upright seat 9  5' Upright seat 9  5' Upright seat 9  5' Upright  Seat 9  5' Upright  Seat 9  5' Upright  Seat 9  5' Upright  Seat 9  5'    Stepping over hurdles    FWD step-to x3 laps  FWD step-to x3 laps      Step ups 4\" 4x5 4\" 4x5 Up/down 4\" 4x5 cues Up/down 6\" x15 cues Up/down  6\"x20x Up/down  6\"x20x      Golf swing/balance     3'       STS     2 foam 10x: 1 foam 5x       Taps to cone     10x       Mini Squats 10x 10x    Mirror 2x10 cues  Mirror 2x10 cues    Pt Ed     HEP POC POC POC, HEP    Re-Evaluation       PRABHU DK, D/C    Modalities            Heat/ice (PRN)                                 "

## 2024-07-15 ENCOUNTER — APPOINTMENT (OUTPATIENT)
Dept: RADIOLOGY | Facility: AMBULARY SURGERY CENTER | Age: 69
End: 2024-07-15
Attending: STUDENT IN AN ORGANIZED HEALTH CARE EDUCATION/TRAINING PROGRAM
Payer: COMMERCIAL

## 2024-07-15 ENCOUNTER — OFFICE VISIT (OUTPATIENT)
Dept: OBGYN CLINIC | Facility: CLINIC | Age: 69
End: 2024-07-15
Payer: COMMERCIAL

## 2024-07-15 VITALS — BODY MASS INDEX: 40.74 KG/M2 | WEIGHT: 291 LBS | HEIGHT: 71 IN

## 2024-07-15 DIAGNOSIS — Z87.81 S/P RIGHT HIP FRACTURE: Primary | ICD-10-CM

## 2024-07-15 DIAGNOSIS — Z87.81 S/P RIGHT HIP FRACTURE: ICD-10-CM

## 2024-07-15 PROCEDURE — 73502 X-RAY EXAM HIP UNI 2-3 VIEWS: CPT

## 2024-07-15 PROCEDURE — 99213 OFFICE O/P EST LOW 20 MIN: CPT | Performed by: STUDENT IN AN ORGANIZED HEALTH CARE EDUCATION/TRAINING PROGRAM

## 2024-07-15 NOTE — PROGRESS NOTES
Orthopaedic Surgery - Office Note  Kemal Huddleston (69 y.o. male)   : 1955   MRN: [id number]  Encounter Date: 7/15/2024    Chief Complaint   Patient presents with    Right Hip - Post-op     Past Surgical History:   Procedure Laterality Date    COLONOSCOPY  2015    PA OPTX FEM SHFT FX W/INSJ IMED IMPLT W/WO SCREW Right 2024    Procedure: INSERTION NAIL IM FEMUR ANTEGRADE (TROCHANTERIC), and all associated procedures;  Surgeon: Frandy Vaca DO;  Location: [facility];  Service: Orthopedics    TONSILLECTOMY      VASECTOMY       Assessment / Plan  S/p insertion right femoral intramedullary nail, DOS: 24  Right peritrochanteric femur fracture    X-rays reviewed and discussed with patient revealing: maintained alignment of patient's previous peritrochanteric fracture with no signs of hardware failure or loosening.  Continued fracture healing with increase in callus formation  Patient to continue to be weightbearing as tolerated to the right lower extremity  No longer requires DVT ppx  Range of motion as tolerated to right lower extremity  Patient has completed PT at this time   Patient to continue at home analgesic medication with Tylenol  Patient to follow-up in 6 months for repeat x-ray right hip and reevaluation    History of Present Illness  Kemal Huddleston is a 69 y.o. male who presents 2 weeks s/p S/p insertion right femoral intramedullary nail, DOS: 24. He presents today using a walker to assist with ambulation. He states he is doing well and denies any significant pain of the right lower extremity. He takes Tylenol for pain relief. He states overall he is doing well. He continues to work with at home PT.  He denies any numbness, tingling or paresthesias.     Interval history 2024: Kemal is here today for follow up 8 weeks s/p right femoral cephalomedullary nail. He is doing well with minimal pain. He has been progressing with PT and no longer uses a walker. He uses a cane. He has  "been completed ASA for dvt ppx. He denies any numbness, tingling, or paresthesias.     Interval history 4/8/2024:  The patient presents 3 months s/p right trochanteric femur IM nail insertion, 1/11/2024.  He is doing well.  Today he has no right hip/thigh pain complaints.  He continues to participate in physical therapy with benefit.  He does use SPC et does small distances without.  He denies any numbness, tingling or paresthesias.     Interval history 7/15/2004  Patient presents approximately 6-month status post right intertrochanteric femoral nail 1/11/2024.  He overall is doing well and ambulates with no assistive devices at this time.  He has no pain to the right hip or thigh region and denies any acute complaints.  He has some soreness with over-exertion and after playing a round of golf. He continues to participate in physical therapy and is happy with his overall progression.  He has no other acute complaints.  He denies any numbness or paresthesias.    Review of Systems  Pertinent items are noted in HPI.  All other systems were reviewed and are negative.    Physical Exam  Ht 5' 11\" (1.803 m)   Wt 132 kg (291 lb)   BMI 40.59 kg/m²   Cons: Appears well.  No apparent distress.  Psych: Alert. Oriented x3.  Mood and affect normal.  Eyes: PERRLA, EOMI  Resp: Normal effort.  No audible wheezing or stridor.  CV: Palpable pulse.  No discernable arrhythmia.    Lymph:  No palpable cervical, axillary, or inguinal lymphadenopathy.  Skin: Warm.  No palpable masses.  No visible lesions.  Neuro: Normal muscle tone.  Normal and symmetric DTR's.     Right lower extremity   The right  lower extremity was exposed and inspected. Surgical incisions are well healed without drainage or signs of dehiscence. All other visible skin intact without erythema, ecchymosis, effusion or obvious osseous deformity. No tenderness about the hip. Pt able to range hip from 0-110 degrees of flexion, 30 degrees to external rotation, and 10 " degrees of internal rotation. Sensation intact to superficial peroneal, deep peroneal, sural, saphenous, plantar nerve distributions. Motor intact to extensor hallux longus, tibialis anterior, gastrocnemius muscles, extensor mechanism intact. Limb is well perfused.  Leg lengths equal palpation at the medial malleoli, patellas, iliac crest. brisk capillary refill in all 5 digits. Compartments soft and compressible.     Studies Reviewed  X-rays right femur reveal maintained alignment of patient's previous peritrochanteric fracture with no signs of hardware failure or loosening.  There is been continued fracture healing and consolidation at the proximal femur no visible fracture lines remaining          Procedures  none    Medical, Surgical, Family, and Social History  The patient's medical history, family history, and social history, were reviewed and updated as appropriate.    Past Medical History:   Diagnosis Date    Hypertension            Family History   Problem Relation Age of Onset    Cancer Mother     Hypertension Father        Social History     Occupational History    Not on file   Tobacco Use    Smoking status: Never    Smokeless tobacco: Never   Vaping Use    Vaping status: Never Used   Substance and Sexual Activity    Alcohol use: Yes     Alcohol/week: 4.0 standard drinks of alcohol     Types: 1 Glasses of wine, 1 Cans of beer, 1 Shots of liquor, 1 Standard drinks or equivalent per week     Comment: occasional    Drug use: Never    Sexual activity: Not on file       No Known Allergies      Current Outpatient Medications:     amLODIPine (NORVASC) 5 mg tablet, TAKE 1 TABLET BY MOUTH DAILY, Disp: 100 tablet, Rfl: 2    olmesartan-hydrochlorothiazide (BENICAR HCT) 20-12.5 MG per tablet, TAKE 1 TABLET BY MOUTH DAILY, Disp: 100 tablet, Rfl: 2      Andrae Mary PA-C    Scribe Attestation      I,:   am acting as a scribe while in the presence of the attending physician.:       I,:   personally performed the  services described in this documentation    as scribed in my presence.:

## 2024-07-29 ENCOUNTER — RA CDI HCC (OUTPATIENT)
Dept: OTHER | Facility: HOSPITAL | Age: 69
End: 2024-07-29

## 2024-07-29 PROBLEM — Z00.00 ANNUAL PHYSICAL EXAM: Status: RESOLVED | Noted: 2021-07-20 | Resolved: 2024-07-29

## 2024-08-05 ENCOUNTER — OFFICE VISIT (OUTPATIENT)
Dept: FAMILY MEDICINE CLINIC | Facility: CLINIC | Age: 69
End: 2024-08-05
Payer: COMMERCIAL

## 2024-08-05 VITALS
TEMPERATURE: 98.3 F | HEIGHT: 71 IN | DIASTOLIC BLOOD PRESSURE: 78 MMHG | OXYGEN SATURATION: 95 % | HEART RATE: 63 BPM | SYSTOLIC BLOOD PRESSURE: 120 MMHG | BODY MASS INDEX: 37.38 KG/M2 | RESPIRATION RATE: 16 BRPM | WEIGHT: 267 LBS

## 2024-08-05 DIAGNOSIS — I10 PRIMARY HYPERTENSION: ICD-10-CM

## 2024-08-05 DIAGNOSIS — Z00.00 ANNUAL PHYSICAL EXAM: ICD-10-CM

## 2024-08-05 DIAGNOSIS — Z23 ENCOUNTER FOR IMMUNIZATION: Primary | ICD-10-CM

## 2024-08-05 DIAGNOSIS — E66.01 CLASS 2 SEVERE OBESITY DUE TO EXCESS CALORIES WITH SERIOUS COMORBIDITY AND BODY MASS INDEX (BMI) OF 37.0 TO 37.9 IN ADULT (HCC): ICD-10-CM

## 2024-08-05 DIAGNOSIS — Z00.00 MEDICARE ANNUAL WELLNESS VISIT, SUBSEQUENT: ICD-10-CM

## 2024-08-05 DIAGNOSIS — N40.0 BENIGN PROSTATIC HYPERPLASIA WITHOUT LOWER URINARY TRACT SYMPTOMS: ICD-10-CM
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
## 2024-08-05 PROBLEM — S72.144A NONDISPLACED INTERTROCHANTERIC FRACTURE OF RIGHT FEMUR, INITIAL ENCOUNTER FOR CLOSED FRACTURE (HCC): Status: RESOLVED | Noted: 2024-01-10 | Resolved: 2024-08-05

## 2024-08-05 PROCEDURE — G0009 ADMIN PNEUMOCOCCAL VACCINE: HCPCS

## 2024-08-05 PROCEDURE — 90677 PCV20 VACCINE IM: CPT

## 2024-08-05 PROCEDURE — 99397 PER PM REEVAL EST PAT 65+ YR: CPT | Performed by: FAMILY MEDICINE

## 2024-08-05 PROCEDURE — G0439 PPPS, SUBSEQ VISIT: HCPCS | Performed by: FAMILY MEDICINE

## 2024-08-05 NOTE — PROGRESS NOTES
Ambulatory Visit  Name: Kemal Huddleston      : 1955      MRN: [id number]  Encounter Provider: Heike Nelson MD  Encounter Date: 2024   Encounter department: [facility]    Assessment & Plan   1. Encounter for immunization  -     Pneumococcal Conjugate Vaccine 20-valent (Pcv20)  2. Class 2 severe obesity due to excess calories with serious comorbidity and body mass index (BMI) of 37.0 to 37.9 in adult (HCC)  Assessment & Plan:  Discussion on diet and exercise guidelines for weight loss and  health reviewed with pt     3. Primary hypertension  Assessment & Plan:  Well controlled on current therapy continue with current medications and will reassess next visit    4. Annual physical exam  Assessment & Plan:  ABN signed  pt wants physical  Orders:  -     CBC and differential; Future  -     Comprehensive metabolic panel; Future; Expected date: 2024  -     Lipid panel; Future; Expected date: 2024  -     TSH, 3rd generation; Future  -     Urinalysis with microscopic  -     CBC and differential  -     Comprehensive metabolic panel  -     Lipid panel  -     TSH, 3rd generation  5. Medicare annual wellness visit, subsequent  Assessment & Plan:  reviewed  6. Benign prostatic hyperplasia without lower urinary tract symptoms  Assessment & Plan:  No symptoms    Orders:  -     PSA, total and free; Future  -     PSA, total and free       Preventive health issues were discussed with patient, and age appropriate screening tests were ordered as noted in patient's After Visit Summary. Personalized health advice and appropriate referrals for health education or preventive services given if needed, as noted in patient's After Visit Summary.    History of Present Illness     HPI   Patient Care Team:  Heike Nelson MD as PCP - General (Family Medicine)  Heike Nelson MD as PCP - PCP-[facility] ([facility])    Review of Systems  Medical History Reviewed by provider this encounter:        Annual Wellness Visit Questionnaire   Kemal is here for his Subsequent Wellness visit.     Health Risk Assessment:   Patient rates overall health as very good. Patient feels that their physical health rating is same. Patient is very satisfied with their life. Eyesight was rated as same. Hearing was rated as same. Patient feels that their emotional and mental health rating is same. Patients states they are sometimes angry. Patient states they are sometimes unusually tired/fatigued. Pain experienced in the last 7 days has been none. Patient states that he has experienced no weight loss or gain in last 6 months.     Depression Screening:   PHQ-2 Score: 0      Fall Risk Screening:   In the past year, patient has experienced: no history of falling in past year      Home Safety:  Patient does not have trouble with stairs inside or outside of their home. Patient has working smoke alarms and has working carbon monoxide detector. Home safety hazards include: none.     Nutrition:   Current diet is Regular.     Medications:   Patient is currently taking over-the-counter supplements. OTC medications include: see medication list. Patient is able to manage medications.     Activities of Daily Living (ADLs)/Instrumental Activities of Daily Living (IADLs):   Walk and transfer into and out of bed and chair?: Yes  Dress and groom yourself?: Yes    Bathe or shower yourself?: Yes    Feed yourself? Yes  Do your laundry/housekeeping?: Yes  Manage your money, pay your bills and track your expenses?: Yes  Make your own meals?: Yes    Do your own shopping?: Yes    Previous Hospitalizations:   Any hospitalizations or ED visits within the last 12 months?: Yes    How many hospitalizations have you had in the last year?: 1-2    Advance Care Planning:   Living will: No    Durable POA for healthcare: No    Advanced directive: No    Advanced directive counseling given: Yes    ACP document given: Yes    Patient declined ACP directive: No    End of  Life Decisions reviewed with patient: Yes    Provider agrees with end of life decisions: Yes      Cognitive Screening:   Provider or family/friend/caregiver concerned regarding cognition?: No    PREVENTIVE SCREENINGS      Cardiovascular Screening:    General: Risks and Benefits Discussed    Due for: Lipid Panel      Diabetes Screening:     General: Risks and Benefits Discussed    Due for: Blood Glucose      Colorectal Cancer Screening:     General: Screening Current      Prostate Cancer Screening:    General: Risks and Benefits Discussed    Due for: PSA      Osteoporosis Screening:    General: Screening Not Indicated      Abdominal Aortic Aneurysm (AAA) Screening:    Risk factors include: age between 65-76 yo        General: Screening Not Indicated      Lung Cancer Screening:     General: Screening Not Indicated      Hepatitis C Screening:    General: Screening Current    Screening, Brief Intervention, and Referral to Treatment (SBIRT)    Screening      AUDIT-C Screenin) How often did you have a drink containing alcohol in the past year? monthly or less  2) How many drinks did you have on a typical day when you were drinking in the past year? 1 to 2    Single Item Drug Screening:  How often have you used an illegal drug (including marijuana) or a prescription medication for non-medical reasons in the past year? never    Single Item Drug Screen Score: 0  Interpretation: Negative screen for possible drug use disorder    Social Determinants of Health     Financial Resource Strain: Low Risk  (2023)    Overall Financial Resource Strain (CARDIA)    • Difficulty of Paying Living Expenses: Not hard at all   Food Insecurity: No Food Insecurity (2024)    Hunger Vital Sign    • Worried About Running Out of Food in the Last Year: Never true    • Ran Out of Food in the Last Year: Never true   Transportation Needs: No Transportation Needs (2024)    PRAPARE - Transportation    • Lack of Transportation  "(Medical): No    • Lack of Transportation (Non-Medical): No   Housing Stability: Low Risk  (1/12/2024)    Housing Stability Vital Sign    • Unable to Pay for Housing in the Last Year: No    • Number of Times Moved in the Last Year: 1    • Homeless in the Last Year: No   Utilities: Not At Risk (1/12/2024)    [facility] Utilities    • Threatened with loss of utilities: No     No results found.    Objective     /78 (BP Location: Left arm, Patient Position: Sitting, Cuff Size: Large)   Pulse 63   Temp 98.3 °F (36.8 °C) (Tympanic)   Resp 16   Ht 5' 11\" (1.803 m)   Wt 121 kg (267 lb)   SpO2 95%   BMI 37.24 kg/m²     Physical Exam      "

## 2024-08-05 NOTE — PATIENT INSTRUCTIONS
Medicare Preventive Visit Patient Instructions  Thank you for completing your Welcome to Medicare Visit or Medicare Annual Wellness Visit today. Your next wellness visit will be due in one year (8/6/2025).  The screening/preventive services that you may require over the next 5-10 years are detailed below. Some tests may not apply to you based off risk factors and/or age. Screening tests ordered at today's visit but not completed yet may show as past due. Also, please note that scanned in results may not display below.  Preventive Screenings:  Service Recommendations Previous Testing/Comments   Colorectal Cancer Screening  Colonoscopy    Fecal Occult Blood Test (FOBT)/Fecal Immunochemical Test (FIT)  Fecal DNA/Cologuard Test  Flexible Sigmoidoscopy Age: 45-75 years old   Colonoscopy: every 10 years (May be performed more frequently if at higher risk)  OR  FOBT/FIT: every 1 year  OR  Cologuard: every 3 years  OR  Sigmoidoscopy: every 5 years  Screening may be recommended earlier than age 45 if at higher risk for colorectal cancer. Also, an individualized decision between you and your healthcare provider will decide whether screening between the ages of 76-85 would be appropriate. Colonoscopy: 08/16/2019  FOBT/FIT: Not on file  Cologuard: Not on file  Sigmoidoscopy: Not on file    Screening Current     Prostate Cancer Screening Individualized decision between patient and health care provider in men between ages of 55-69   Medicare will cover every 12 months beginning on the day after your 50th birthday PSA: 0.39 ng/mL     Risks and Benefits Discussed  Due for PSA     Hepatitis C Screening Once for adults born between 1945 and 1965  More frequently in patients at high risk for Hepatitis C Hep C Antibody: 08/17/2018    Screening Current   Diabetes Screening 1-2 times per year if you're at risk for diabetes or have pre-diabetes Fasting glucose: No results in last 5 years (No results in last 5 years)  A1C: No results in  last 5 years (No results in last 5 years)  Risks and Benefits Discussed  Due for Blood Glucose   Cholesterol Screening Once every 5 years if you don't have a lipid disorder. May order more often based on risk factors. Lipid panel: 08/07/2023  Risks and Benefits Discussed  Due for Lipid Panel      Other Preventive Screenings Covered by Medicare:  Abdominal Aortic Aneurysm (AAA) Screening: covered once if your at risk. You're considered to be at risk if you have a family history of AAA or a male between the age of 65-75 who smoking at least 100 cigarettes in your lifetime.  Lung Cancer Screening: covers low dose CT scan once per year if you meet all of the following conditions: (1) Age 55-77; (2) No signs or symptoms of lung cancer; (3) Current smoker or have quit smoking within the last 15 years; (4) You have a tobacco smoking history of at least 20 pack years (packs per day x number of years you smoked); (5) You get a written order from a healthcare provider.  Glaucoma Screening: covered annually if you're considered high risk: (1) You have diabetes OR (2) Family history of glaucoma OR (3)  aged 50 and older OR (4)  American aged 65 and older  Osteoporosis Screening: covered every 2 years if you meet one of the following conditions: (1) Have a vertebral abnormality; (2) On glucocorticoid therapy for more than 3 months; (3) Have primary hyperparathyroidism; (4) On osteoporosis medications and need to assess response to drug therapy.  HIV Screening: covered annually if you're between the age of 15-65. Also covered annually if you are younger than 15 and older than 65 with risk factors for HIV infection. For pregnant patients, it is covered up to 3 times per pregnancy.    Immunizations:  Immunization Recommendations   Influenza Vaccine Annual influenza vaccination during flu season is recommended for all persons aged >= 6 months who do not have contraindications   Pneumococcal Vaccine   *  Pneumococcal conjugate vaccine = PCV13 (Prevnar 13), PCV15 (Vaxneuvance), PCV20 (Prevnar 20)  * Pneumococcal polysaccharide vaccine = PPSV23 (Pneumovax) Adults 19-63 yo with certain risk factors or if 65+ yo  If never received any pneumonia vaccine: recommend Prevnar 20 (PCV20)  Give PCV20 if previously received 1 dose of PCV13 or PPSV23   Hepatitis B Vaccine 3 dose series if at intermediate or high risk (ex: diabetes, end stage renal disease, liver disease)   Respiratory syncytial virus (RSV) Vaccine - COVERED BY MEDICARE PART D  * RSVPreF3 (Arexvy) CDC recommends that adults 60 years of age and older may receive a single dose of RSV vaccine using shared clinical decision-making (SCDM)   Tetanus (Td) Vaccine - COST NOT COVERED BY MEDICARE PART B Following completion of primary series, a booster dose should be given every 10 years to maintain immunity against tetanus. Td may also be given as tetanus wound prophylaxis.   Tdap Vaccine - COST NOT COVERED BY MEDICARE PART B Recommended at least once for all adults. For pregnant patients, recommended with each pregnancy.   Shingles Vaccine (Shingrix) - COST NOT COVERED BY MEDICARE PART B  2 shot series recommended in those 19 years and older who have or will have weakened immune systems or those 50 years and older     Health Maintenance Due:      Topic Date Due   • Colorectal Cancer Screening  08/16/2029   • Hepatitis C Screening  Completed     Immunizations Due:      Topic Date Due   • Pneumococcal Vaccine: 65+ Years (2 of 2 - PCV) 07/10/2021   • Influenza Vaccine (1) 09/01/2024     Advance Directives   What are advance directives?  Advance directives are legal documents that state your wishes and plans for medical care. These plans are made ahead of time in case you lose your ability to make decisions for yourself. Advance directives can apply to any medical decision, such as the treatments you want, and if you want to donate organs.   What are the types of advance  directives?  There are many types of advance directives, and each state has rules about how to use them. You may choose a combination of any of the following:  Living will:  This is a written record of the treatment you want. You can also choose which treatments you do not want, which to limit, and which to stop at a certain time. This includes surgery, medicine, IV fluid, and tube feedings.   Durable power of  for healthcare (DPAHC):  This is a written record that states who you want to make healthcare choices for you when you are unable to make them for yourself. This person, called a proxy, is usually a family member or a friend. You may choose more than 1 proxy.  Do not resuscitate (DNR) order:  A DNR order is used in case your heart stops beating or you stop breathing. It is a request not to have certain forms of treatment, such as CPR. A DNR order may be included in other types of advance directives.  Medical directive:  This covers the care that you want if you are in a coma, near death, or unable to make decisions for yourself. You can list the treatments you want for each condition. Treatment may include pain medicine, surgery, blood transfusions, dialysis, IV or tube feedings, and a ventilator (breathing machine).  Values history:  This document has questions about your views, beliefs, and how you feel and think about life. This information can help others choose the care that you would choose.  Why are advance directives important?  An advance directive helps you control your care. Although spoken wishes may be used, it is better to have your wishes written down. Spoken wishes can be misunderstood, or not followed. Treatments may be given even if you do not want them. An advance directive may make it easier for your family to make difficult choices about your care.   Weight Management   Why it is important to manage your weight:  Being overweight increases your risk of health conditions such as  heart disease, high blood pressure, type 2 diabetes, and certain types of cancer. It can also increase your risk for osteoarthritis, sleep apnea, and other respiratory problems. Aim for a slow, steady weight loss. Even a small amount of weight loss can lower your risk of health problems.  How to lose weight safely:  A safe and healthy way to lose weight is to eat fewer calories and get regular exercise. You can lose up about 1 pound a week by decreasing the number of calories you eat by 500 calories each day.   Healthy meal plan for weight management:  A healthy meal plan includes a variety of foods, contains fewer calories, and helps you stay healthy. A healthy meal plan includes the following:  Eat whole-grain foods more often.  A healthy meal plan should contain fiber. Fiber is the part of grains, fruits, and vegetables that is not broken down by your body. Whole-grain foods are healthy and provide extra fiber in your diet. Some examples of whole-grain foods are whole-wheat breads and pastas, oatmeal, brown rice, and bulgur.  Eat a variety of vegetables every day.  Include dark, leafy greens such as spinach, kale, anju greens, and mustard greens. Eat yellow and orange vegetables such as carrots, sweet potatoes, and winter squash.   Eat a variety of fruits every day.  Choose fresh or canned fruit (canned in its own juice or light syrup) instead of juice. Fruit juice has very little or no fiber.  Eat low-fat dairy foods.  Drink fat-free (skim) milk or 1% milk. Eat fat-free yogurt and low-fat cottage cheese. Try low-fat cheeses such as mozzarella and other reduced-fat cheeses.  Choose meat and other protein foods that are low in fat.  Choose beans or other legumes such as split peas or lentils. Choose fish, skinless poultry (chicken or turkey), or lean cuts of red meat (beef or pork). Before you cook meat or poultry, cut off any visible fat.   Use less fat and oil.  Try baking foods instead of frying them. Add  less fat, such as margarine, sour cream, regular salad dressing and mayonnaise to foods. Eat fewer high-fat foods. Some examples of high-fat foods include french fries, doughnuts, ice cream, and cakes.  Eat fewer sweets.  Limit foods and drinks that are high in sugar. This includes candy, cookies, regular soda, and sweetened drinks.  Exercise:  Exercise at least 30 minutes per day on most days of the week. Some examples of exercise include walking, biking, dancing, and swimming. You can also fit in more physical activity by taking the stairs instead of the elevator or parking farther away from stores. Ask your healthcare provider about the best exercise plan for you.      © Copyright [employer] 2018 Information is for End User's use only and may not be sold, redistributed or otherwise used for commercial purposes. All illustrations and images included in CareNotes® are the copyrighted property of [employer]., Inc. or [employer]

## 2024-08-05 NOTE — PROGRESS NOTES
Ambulatory Visit  Name: Kemal Huddleston      : 1955      MRN: [id number]  Encounter Provider: Heike Nelson MD  Encounter Date: 2024   Encounter department: [facility]    Assessment & Plan   1. Encounter for immunization  -     Pneumococcal Conjugate Vaccine 20-valent (Pcv20)  2. Class 2 severe obesity due to excess calories with serious comorbidity and body mass index (BMI) of 37.0 to 37.9 in adult (HCC)  Assessment & Plan:  Discussion on diet and exercise guidelines for weight loss and  health reviewed with pt     3. Primary hypertension  Assessment & Plan:  Well controlled on current therapy continue with current medications and will reassess next visit    4. Annual physical exam  Assessment & Plan:  ABN signed  pt wants physical  Orders:  -     CBC and differential; Future  -     Comprehensive metabolic panel; Future; Expected date: 2024  -     Lipid panel; Future; Expected date: 2024  -     TSH, 3rd generation; Future  -     Urinalysis with microscopic  -     CBC and differential  -     Comprehensive metabolic panel  -     Lipid panel  -     TSH, 3rd generation  5. Medicare annual wellness visit, subsequent  Assessment & Plan:  reviewed  6. Benign prostatic hyperplasia without lower urinary tract symptoms  Assessment & Plan:  No symptoms    Orders:  -     PSA, total and free; Future  -     PSA, total and free       History of Present Illness     Pt is here for interval visit and evaluation of multiple medical problems, review of medications, labs, Health Maintenance and any recent specialty consults ortho  pt needs medicare wellness and wants his usual annual physical           Review of Systems   Constitutional:  Negative for appetite change, chills, fatigue and fever.   Respiratory:  Negative for cough, chest tightness and shortness of breath.    Cardiovascular:  Negative for chest pain, palpitations and leg swelling.   Gastrointestinal:  Negative for  "abdominal pain, constipation, diarrhea, nausea and vomiting.   Genitourinary:  Negative for difficulty urinating and frequency.   Musculoskeletal:  Negative for arthralgias, back pain, gait problem and neck pain.   Skin:  Negative for rash.   Neurological:  Negative for dizziness, weakness, light-headedness, numbness and headaches.   Hematological:  Does not bruise/bleed easily.   Psychiatric/Behavioral:  Negative for dysphoric mood and sleep disturbance. The patient is not nervous/anxious.        Objective     /78 (BP Location: Left arm, Patient Position: Sitting, Cuff Size: Large)   Pulse 63   Temp 98.3 °F (36.8 °C) (Tympanic)   Resp 16   Ht 5' 11\" (1.803 m)   Wt 121 kg (267 lb)   SpO2 95%   BMI 37.24 kg/m²     Physical Exam  Vitals and nursing note reviewed.   Constitutional:       General: He is not in acute distress.     Appearance: Normal appearance. He is well-developed. He is obese.   HENT:      Right Ear: Tympanic membrane and ear canal normal.      Left Ear: Tympanic membrane and ear canal normal.      Mouth/Throat:      Mouth: Mucous membranes are moist.   Eyes:      Extraocular Movements: Extraocular movements intact.      Conjunctiva/sclera: Conjunctivae normal.      Pupils: Pupils are equal, round, and reactive to light.   Neck:      Thyroid: No thyromegaly.      Vascular: No carotid bruit.   Cardiovascular:      Rate and Rhythm: Normal rate and regular rhythm.      Pulses: Normal pulses.      Heart sounds: Normal heart sounds. No murmur heard.  Pulmonary:      Effort: Pulmonary effort is normal. No respiratory distress.      Breath sounds: Normal breath sounds. No wheezing or rales.   Abdominal:      General: Bowel sounds are normal. There is no distension.      Palpations: Abdomen is soft. There is no mass.      Tenderness: There is no abdominal tenderness.      Hernia: No hernia is present.   Musculoskeletal:      Cervical back: Normal range of motion and neck supple.      Right lower " leg: No edema.      Left lower leg: No edema.   Lymphadenopathy:      Cervical: No cervical adenopathy.   Skin:     General: Skin is warm and dry.      Capillary Refill: Capillary refill takes less than 2 seconds.      Findings: No rash.   Neurological:      General: No focal deficit present.      Mental Status: He is alert and oriented to person, place, and time.      Cranial Nerves: No cranial nerve deficit.      Sensory: No sensory deficit.      Motor: No weakness.      Coordination: Coordination normal.      Gait: Gait normal.      Deep Tendon Reflexes: Reflexes normal.   Psychiatric:         Mood and Affect: Mood normal.         Behavior: Behavior normal.         Thought Content: Thought content normal.       Administrative Statements

## 2024-08-10 LAB
ALBUMIN SERPL-MCNC: 4 G/DL (ref 3.6–5.1)
ALBUMIN/GLOB SERPL: 1.9 (CALC) (ref 1–2.5)
ALP SERPL-CCNC: 105 U/L (ref 35–144)
ALT SERPL-CCNC: 25 U/L (ref 9–46)
APPEARANCE UR: CLEAR
AST SERPL-CCNC: 24 U/L (ref 10–35)
BACTERIA UR QL AUTO: NORMAL /HPF
BASOPHILS # BLD AUTO: 49 CELLS/UL (ref 0–200)
BASOPHILS NFR BLD AUTO: 1 %
BILIRUB SERPL-MCNC: 0.7 MG/DL (ref 0.2–1.2)
BILIRUB UR QL STRIP: NEGATIVE
BUN SERPL-MCNC: 14 MG/DL (ref 7–25)
BUN/CREAT SERPL: NORMAL (CALC) (ref 6–22)
CALCIUM SERPL-MCNC: 9.9 MG/DL (ref 8.6–10.3)
CHLORIDE SERPL-SCNC: 103 MMOL/L (ref 98–110)
CHOLEST SERPL-MCNC: 142 MG/DL
CHOLEST/HDLC SERPL: 2 (CALC)
CO2 SERPL-SCNC: 32 MMOL/L (ref 20–32)
COLOR UR: YELLOW
CREAT SERPL-MCNC: 0.82 MG/DL (ref 0.7–1.28)
EOSINOPHIL # BLD AUTO: 240 CELLS/UL (ref 15–500)
EOSINOPHIL NFR BLD AUTO: 4.9 %
ERYTHROCYTE [DISTWIDTH] IN BLOOD BY AUTOMATED COUNT: 12.8 % (ref 11–15)
GFR/BSA.PRED SERPLBLD CYS-BASED-ARV: 94 ML/MIN/1.73M2
GLOBULIN SER CALC-MCNC: 2.1 G/DL (CALC) (ref 1.9–3.7)
GLUCOSE SERPL-MCNC: 90 MG/DL (ref 65–99)
GLUCOSE UR QL STRIP: NEGATIVE
HCT VFR BLD AUTO: 43.4 % (ref 38.5–50)
HDLC SERPL-MCNC: 71 MG/DL
HGB BLD-MCNC: 14.4 G/DL (ref 13.2–17.1)
HGB UR QL STRIP: NEGATIVE
HYALINE CASTS #/AREA URNS LPF: NORMAL /LPF
KETONES UR QL STRIP: NEGATIVE
LDLC SERPL CALC-MCNC: 58 MG/DL (CALC)
LEUKOCYTE ESTERASE UR QL STRIP: NEGATIVE
LYMPHOCYTES # BLD AUTO: 1112 CELLS/UL (ref 850–3900)
LYMPHOCYTES NFR BLD AUTO: 22.7 %
MCH RBC QN AUTO: 30.4 PG (ref 27–33)
MCHC RBC AUTO-ENTMCNC: 33.2 G/DL (ref 32–36)
MCV RBC AUTO: 91.8 FL (ref 80–100)
MONOCYTES # BLD AUTO: 617 CELLS/UL (ref 200–950)
MONOCYTES NFR BLD AUTO: 12.6 %
NEUTROPHILS # BLD AUTO: 2881 CELLS/UL (ref 1500–7800)
NEUTROPHILS NFR BLD AUTO: 58.8 %
NITRITE UR QL STRIP: NEGATIVE
NONHDLC SERPL-MCNC: 71 MG/DL (CALC)
PH UR STRIP: 7.5 [PH] (ref 5–8)
PLATELET # BLD AUTO: 238 THOUSAND/UL (ref 140–400)
PMV BLD REES-ECKER: 9.4 FL (ref 7.5–12.5)
POTASSIUM SERPL-SCNC: 4.5 MMOL/L (ref 3.5–5.3)
PROT SERPL-MCNC: 6.1 G/DL (ref 6.1–8.1)
PROT UR QL STRIP: NEGATIVE
PSA FREE MFR SERPL: 60 % (CALC)
PSA FREE SERPL-MCNC: 0.3 NG/ML
PSA SERPL-MCNC: 0.5 NG/ML
RBC # BLD AUTO: 4.73 MILLION/UL (ref 4.2–5.8)
RBC #/AREA URNS HPF: NORMAL /HPF
SODIUM SERPL-SCNC: 139 MMOL/L (ref 135–146)
SP GR UR STRIP: 1.01 (ref 1–1.03)
SQUAMOUS #/AREA URNS HPF: NORMAL /HPF
TRIGL SERPL-MCNC: 52 MG/DL
TSH SERPL-ACNC: 3.25 MIU/L (ref 0.4–4.5)
WBC # BLD AUTO: 4.9 THOUSAND/UL (ref 3.8–10.8)
WBC #/AREA URNS HPF: NORMAL /HPF

## 2024-08-16 NOTE — TELEPHONE ENCOUNTER
LMOM for patient to call and reschedule their appointment 1/13/25, provider changed their schedule.  Left phone number.

## 2024-09-04 PROBLEM — Z00.00 MEDICARE ANNUAL WELLNESS VISIT, SUBSEQUENT: Status: RESOLVED | Noted: 2020-07-10 | Resolved: 2024-09-04

## 2024-11-17 NOTE — PLAN OF CARE
Problem: PAIN - ADULT  Goal: Verbalizes/displays adequate comfort level or baseline comfort level  Description: Interventions:  - Encourage patient to monitor pain and request assistance  - Assess pain using appropriate pain scale  - Administer analgesics based on type and severity of pain and evaluate response  - Implement non-pharmacological measures as appropriate and evaluate response  - Consider cultural and social influences on pain and pain management  - Notify physician/advanced practitioner if interventions unsuccessful or patient reports new pain  1/11/2024 0532 by Donya Dee RN  Outcome: Progressing  1/11/2024 0532 by Donya Dee RN  Outcome: Progressing     Problem: INFECTION - ADULT  Goal: Absence or prevention of progression during hospitalization  Description: INTERVENTIONS:  - Assess and monitor for signs and symptoms of infection  - Monitor lab/diagnostic results  - Monitor all insertion sites, i.e. indwelling lines, tubes, and drains  - Monitor endotracheal if appropriate and nasal secretions for changes in amount and color  - [place] appropriate cooling/warming therapies per order  - Administer medications as ordered  - Instruct and encourage patient and family to use good hand hygiene technique  - Identify and instruct in appropriate isolation precautions for identified infection/condition  1/11/2024 0532 by Donya Dee RN  Outcome: Progressing  1/11/2024 0532 by Donya Dee RN  Outcome: Progressing  Goal: Absence of fever/infection during neutropenic period  Description: INTERVENTIONS:  - Monitor WBC    1/11/2024 0532 by Donya Dee RN  Outcome: Progressing  1/11/2024 0532 by Donya Dee RN  Outcome: Progressing     Problem: SAFETY ADULT  Goal: Patient will remain free of falls  Description: INTERVENTIONS:  - Educate patient/family on patient safety including physical limitations  - Instruct patient to call for assistance with  Goal Outcome Evaluation:              Outcome Evaluation: Pt had multiple loose frequent stools with movement, relaxation, coughing noted, FMS inserted to allow bottom to heal. Pt Soft BP noted, Bolus given and currently infusing NS @ 100 mL/hr. Discomfort noted from patient. Shaking has decreased through shift. Safety maintained.                              activity   - Consult OT/PT to assist with strengthening/mobility   - Keep Call bell within reach  - Keep bed low and locked with side rails adjusted as appropriate  - Keep care items and personal belongings within reach  - Initiate and maintain comfort rounds  - Make Fall Risk Sign visible to staff  - Offer Toileting every  Hours, in advance of need  - Initiate/Maintain alarm  - Obtain necessary fall risk management equipment:   - Apply yellow socks and bracelet for high fall risk patients  - Consider moving patient to room near nurses station  1/11/2024 0532 by Donya Dee RN  Outcome: Progressing  1/11/2024 0532 by Donya Dee RN  Outcome: Progressing  Goal: Maintain or return to baseline ADL function  Description: INTERVENTIONS:  -  Assess patient's ability to carry out ADLs; assess patient's baseline for ADL function and identify physical deficits which impact ability to perform ADLs (bathing, care of mouth/teeth, toileting, grooming, dressing, etc.)  - Assess/evaluate cause of self-care deficits   - Assess range of motion  - Assess patient's mobility; develop plan if impaired  - Assess patient's need for assistive devices and provide as appropriate  - Encourage maximum independence but intervene and supervise when necessary  - Involve family in performance of ADLs  - Assess for home care needs following discharge   - Consider OT consult to assist with ADL evaluation and planning for discharge  - Provide patient education as appropriate  1/11/2024 0532 by Donya Dee RN  Outcome: Progressing  1/11/2024 0532 by Donya Dee RN  Outcome: Progressing  Goal: Maintains/Returns to pre admission functional level  Description: INTERVENTIONS:  - Perform AM-PAC 6 Click Basic Mobility/ Daily Activity assessment daily.  - Set and communicate daily mobility goal to care team and patient/family/caregiver.   - Collaborate with rehabilitation services on mobility goals if consulted  -  Perform Range of Motion 3 times a day.  - Reposition patient every 3 hours.  - Dangle patient 3 times a day  - Stand patient 3 times a day  - Ambulate patient 3 times a day  - Out of bed to chair 3 times a day   - Out of bed for meals 3 times a day  - Out of bed for toileting  - Record patient progress and toleration of activity level   1/11/2024 0532 by Donya Dee, MONIQUE  Outcome: Progressing  1/11/2024 0532 by Donya Dee, MONIQUE  Outcome: Progressing

## 2024-11-18 NOTE — TELEPHONE ENCOUNTER
LMOM for patient to call and reschedule their appointment FOR 1/13/25, provider will be [place].  Left phone number.

## 2024-12-18 DIAGNOSIS — I10 ESSENTIAL HYPERTENSION: ICD-10-CM

## 2024-12-19 RX ORDER — AMLODIPINE BESYLATE 5 MG/1
5 TABLET ORAL DAILY
Qty: 100 TABLET | Refills: 1 | Status: SHIPPED | OUTPATIENT
Start: 2024-12-19

## 2024-12-19 RX ORDER — OLMESARTAN MEDOXOMIL AND HYDROCHLOROTHIAZIDE 20/12.5 20; 12.5 MG/1; MG/1
1 TABLET ORAL DAILY
Qty: 100 TABLET | Refills: 1 | Status: SHIPPED | OUTPATIENT
Start: 2024-12-19

## 2025-01-20 ENCOUNTER — APPOINTMENT (OUTPATIENT)
Dept: RADIOLOGY | Facility: AMBULARY SURGERY CENTER | Age: 70
End: 2025-01-20
Attending: STUDENT IN AN ORGANIZED HEALTH CARE EDUCATION/TRAINING PROGRAM
Payer: COMMERCIAL

## 2025-01-20 ENCOUNTER — OFFICE VISIT (OUTPATIENT)
Dept: OBGYN CLINIC | Facility: CLINIC | Age: 70
End: 2025-01-20
Payer: COMMERCIAL

## 2025-01-20 VITALS — HEIGHT: 71 IN | WEIGHT: 267 LBS | BODY MASS INDEX: 37.38 KG/M2

## 2025-01-20 DIAGNOSIS — Z87.81 S/P RIGHT HIP FRACTURE: ICD-10-CM

## 2025-01-20 PROCEDURE — 73502 X-RAY EXAM HIP UNI 2-3 VIEWS: CPT

## 2025-01-20 PROCEDURE — 99213 OFFICE O/P EST LOW 20 MIN: CPT | Performed by: STUDENT IN AN ORGANIZED HEALTH CARE EDUCATION/TRAINING PROGRAM

## 2025-01-20 NOTE — PROGRESS NOTES
Orthopaedic Surgery - Office Note  Kemal Huddleston (69 y.o. male)   : 1955   MRN: [id number]  Encounter Date: 2025    Chief Complaint   Patient presents with   • Right Hip - Post-op     Past Surgical History:   Procedure Laterality Date   • COLONOSCOPY  2015   • NM OPTX FEM SHFT FX W/INSJ IMED IMPLT W/WO SCREW Right 2024    Procedure: INSERTION NAIL IM FEMUR ANTEGRADE (TROCHANTERIC), and all associated procedures;  Surgeon: Frandy Vaca DO;  Location: [facility];  Service: Orthopedics   • TONSILLECTOMY     • VASECTOMY       Assessment / Plan  S/p insertion right femoral intramedullary nail, DOS: 24  Right peritrochanteric femur fracture    X-rays reviewed and discussed with patient revealing: maintained alignment of patient's previous peritrochanteric fracture with no signs of hardware failure or loosening.  Healed fracture.  Patient to continue to be weightbearing as tolerated to the right lower extremity  No longer requires DVT ppx  Range of motion as tolerated to right lower extremity  Patient has completed PT at this time   Patient to continue at home analgesic medication with Tylenol  Patient to follow-up PRN    History of Present Illness  Kemal Huddleston is a 69 y.o. male who presents 2 weeks s/p S/p insertion right femoral intramedullary nail, DOS: 24. He presents today using a walker to assist with ambulation. He states he is doing well and denies any significant pain of the right lower extremity. He takes Tylenol for pain relief. He states overall he is doing well. He continues to work with at home PT.  He denies any numbness, tingling or paresthesias.     Interval history 2024: Kemal is here today for follow up 8 weeks s/p right femoral cephalomedullary nail. He is doing well with minimal pain. He has been progressing with PT and no longer uses a walker. He uses a cane. He has been completed ASA for dvt ppx. He denies any numbness, tingling, or paresthesias.  "    Interval history 4/8/2024:  The patient presents 3 months s/p right trochanteric femur IM nail insertion, 1/11/2024.  He is doing well.  Today he has no right hip/thigh pain complaints.  He continues to participate in physical therapy with benefit.  He does use SPC et does small distances without.  He denies any numbness, tingling or paresthesias.     Interval history 7/15/2004  Patient presents approximately 6-month status post right intertrochanteric femoral nail 1/11/2024.  He overall is doing well and ambulates with no assistive devices at this time.  He has no pain to the right hip or thigh region and denies any acute complaints.  He has some soreness with over-exertion and after playing a round of golf. He continues to participate in physical therapy and is happy with his overall progression.  He has no other acute complaints.  He denies any numbness or paresthesias.    Interval History 1/20/25:  Patient presents approximately 1 year status post right intertrochanteric femoral nail, DOS 1/11/24. He is doing well. He continues to ambulate without an assistive device. Denies any pain, numbness or tingling. No new injuries. He has completed PT.    Review of Systems  Pertinent items are noted in HPI.  All other systems were reviewed and are negative.    Physical Exam  Ht 5' 11\" (1.803 m)   Wt 121 kg (267 lb)   BMI 37.24 kg/m²   Cons: Appears well.  No apparent distress.  Psych: Alert. Oriented x3.  Mood and affect normal.  Eyes: PERRLA, EOMI  Resp: Normal effort.  No audible wheezing or stridor.  CV: Palpable pulse.  No discernable arrhythmia.    Lymph:  No palpable cervical, axillary, or inguinal lymphadenopathy.  Skin: Warm.  No palpable masses.  No visible lesions.  Neuro: Normal muscle tone.  Normal and symmetric DTR's.     Right lower extremity   The right  lower extremity was exposed and inspected. Surgical incisions are well healed. All other visible skin intact without erythema, ecchymosis, effusion " or obvious osseous deformity. No tenderness about the hip. Pt able to range hip from 0-110 degrees of flexion, 30 degrees to external rotation, and 10 degrees of internal rotation. Sensation intact to superficial peroneal, deep peroneal, sural, saphenous, plantar nerve distributions. Motor intact to extensor hallux longus, tibialis anterior, gastrocnemius muscles, extensor mechanism intact. Limb is well perfused.  Leg lengths equal palpation at the medial malleoli, patellas, iliac crest. brisk capillary refill in all 5 digits. Compartments soft and compressible.     Studies Reviewed  X-rays right femur reveal maintained alignment of patient's previous peritrochanteric fracture with no signs of hardware failure or loosening.  There is been continued fracture healing and consolidation at the proximal femur no visible fracture lines remaining.          Procedures  none    Medical, Surgical, Family, and Social History  The patient's medical history, family history, and social history, were reviewed and updated as appropriate.    Past Medical History:   Diagnosis Date   • Annual physical exam 07/20/2021   • Hypertension            Family History   Problem Relation Age of Onset   • Cancer Mother    • Hypertension Father        Social History     Occupational History   • Not on file   Tobacco Use   • Smoking status: Never   • Smokeless tobacco: Never   Vaping Use   • Vaping status: Never Used   Substance and Sexual Activity   • Alcohol use: Yes     Alcohol/week: 4.0 standard drinks of alcohol     Types: 1 Glasses of wine, 1 Cans of beer, 1 Shots of liquor, 1 Standard drinks or equivalent per week     Comment: occasional   • Drug use: Never   • Sexual activity: Not on file       No Known Allergies      Current Outpatient Medications:   •  amLODIPine (NORVASC) 5 mg tablet, TAKE 1 TABLET BY MOUTH DAILY, Disp: 100 tablet, Rfl: 1  •  olmesartan-hydrochlorothiazide (BENICAR HCT) 20-12.5 MG per tablet, TAKE 1 TABLET BY MOUTH  DAILY, Disp: 100 tablet, Rfl: 1      Kathy Rich MD

## 2025-06-28 DIAGNOSIS — I10 ESSENTIAL HYPERTENSION: ICD-10-CM

## 2025-07-01 RX ORDER — OLMESARTAN MEDOXOMIL AND HYDROCHLOROTHIAZIDE 20/12.5 20; 12.5 MG/1; MG/1
1 TABLET ORAL DAILY
Qty: 100 TABLET | Refills: 0 | Status: SHIPPED | OUTPATIENT
Start: 2025-07-01

## 2025-07-01 RX ORDER — AMLODIPINE BESYLATE 5 MG/1
5 TABLET ORAL DAILY
Qty: 100 TABLET | Refills: 0 | Status: SHIPPED | OUTPATIENT
Start: 2025-07-01

## 2025-08-07 ENCOUNTER — OFFICE VISIT (OUTPATIENT)
Dept: FAMILY MEDICINE CLINIC | Facility: CLINIC | Age: 70
End: 2025-08-07
Payer: COMMERCIAL

## 2025-08-07 VITALS
OXYGEN SATURATION: 97 % | BODY MASS INDEX: 38.92 KG/M2 | RESPIRATION RATE: 16 BRPM | DIASTOLIC BLOOD PRESSURE: 80 MMHG | TEMPERATURE: 97.6 F | HEART RATE: 67 BPM | HEIGHT: 71 IN | SYSTOLIC BLOOD PRESSURE: 122 MMHG | WEIGHT: 278 LBS

## 2025-08-07 DIAGNOSIS — N40.0 BENIGN PROSTATIC HYPERPLASIA WITHOUT LOWER URINARY TRACT SYMPTOMS: ICD-10-CM

## 2025-08-07 DIAGNOSIS — Z00.00 ANNUAL PHYSICAL EXAM: Primary | ICD-10-CM

## 2025-08-07 DIAGNOSIS — I10 ESSENTIAL HYPERTENSION: ICD-10-CM

## 2025-08-07 DIAGNOSIS — E66.01 CLASS 2 SEVERE OBESITY DUE TO EXCESS CALORIES WITH SERIOUS COMORBIDITY AND BODY MASS INDEX (BMI) OF 37.0 TO 37.9 IN ADULT (HCC): ICD-10-CM

## 2025-08-07 DIAGNOSIS — E66.812 CLASS 2 SEVERE OBESITY DUE TO EXCESS CALORIES WITH SERIOUS COMORBIDITY AND BODY MASS INDEX (BMI) OF 37.0 TO 37.9 IN ADULT (HCC): ICD-10-CM

## 2025-08-07 DIAGNOSIS — I10 PRIMARY HYPERTENSION: ICD-10-CM

## 2025-08-07 PROCEDURE — 99397 PER PM REEVAL EST PAT 65+ YR: CPT | Performed by: FAMILY MEDICINE

## 2025-08-07 PROCEDURE — G0439 PPPS, SUBSEQ VISIT: HCPCS | Performed by: FAMILY MEDICINE

## 2025-08-07 RX ORDER — AMLODIPINE BESYLATE 5 MG/1
5 TABLET ORAL DAILY
Qty: 100 TABLET | Refills: 3 | Status: SHIPPED | OUTPATIENT
Start: 2025-08-07

## 2025-08-07 RX ORDER — OLMESARTAN MEDOXOMIL AND HYDROCHLOROTHIAZIDE 20/12.5 20; 12.5 MG/1; MG/1
1 TABLET ORAL DAILY
Qty: 100 TABLET | Refills: 3 | Status: SHIPPED | OUTPATIENT
Start: 2025-08-07

## 2025-08-14 LAB
APPEARANCE UR: CLEAR
BACTERIA UR QL AUTO: NORMAL /HPF
BILIRUB UR QL STRIP: NEGATIVE
COLOR UR: YELLOW
GLUCOSE UR QL STRIP: NEGATIVE
HGB UR QL STRIP: NEGATIVE
HYALINE CASTS #/AREA URNS LPF: NORMAL /LPF
KETONES UR QL STRIP: NEGATIVE
LEUKOCYTE ESTERASE UR QL STRIP: NEGATIVE
NITRITE UR QL STRIP: NEGATIVE
PH UR STRIP: 7 [PH] (ref 5–8)
PROT UR QL STRIP: NEGATIVE
RBC #/AREA URNS HPF: NORMAL /HPF
SP GR UR STRIP: 1.01 (ref 1–1.03)
SQUAMOUS #/AREA URNS HPF: NORMAL /HPF
WBC #/AREA URNS HPF: NORMAL /HPF

## 2025-08-20 LAB
ALBUMIN SERPL-MCNC: 4 G/DL (ref 3.6–5.1)
ALBUMIN/GLOB SERPL: 2.4 (CALC) (ref 1–2.5)
ALP SERPL-CCNC: 80 U/L (ref 35–144)
ALT SERPL-CCNC: 27 U/L (ref 9–46)
AST SERPL-CCNC: 23 U/L (ref 10–35)
BASOPHILS # BLD AUTO: 39 CELLS/UL (ref 0–200)
BASOPHILS NFR BLD AUTO: 0.7 %
BILIRUB SERPL-MCNC: 0.7 MG/DL (ref 0.2–1.2)
BUN SERPL-MCNC: 17 MG/DL (ref 7–25)
BUN/CREAT SERPL: ABNORMAL (CALC) (ref 6–22)
CALCIUM SERPL-MCNC: 9.4 MG/DL (ref 8.6–10.3)
CHLORIDE SERPL-SCNC: 105 MMOL/L (ref 98–110)
CHOLEST SERPL-MCNC: 135 MG/DL
CHOLEST/HDLC SERPL: 2 (CALC)
CO2 SERPL-SCNC: 32 MMOL/L (ref 20–32)
CREAT SERPL-MCNC: 0.78 MG/DL (ref 0.7–1.28)
EOSINOPHIL # BLD AUTO: 330 CELLS/UL (ref 15–500)
EOSINOPHIL NFR BLD AUTO: 5.9 %
ERYTHROCYTE [DISTWIDTH] IN BLOOD BY AUTOMATED COUNT: 12.6 % (ref 11–15)
GFR/BSA.PRED SERPLBLD CYS-BASED-ARV: 95 ML/MIN/1.73M2
GLOBULIN SER CALC-MCNC: 1.7 G/DL (CALC) (ref 1.9–3.7)
GLUCOSE SERPL-MCNC: 94 MG/DL (ref 65–99)
HBA1C MFR BLD: 5.5 %
HCT VFR BLD AUTO: 42.8 % (ref 38.5–50)
HDLC SERPL-MCNC: 68 MG/DL
HGB BLD-MCNC: 14.5 G/DL (ref 13.2–17.1)
LDLC SERPL CALC-MCNC: 53 MG/DL (CALC)
LYMPHOCYTES # BLD AUTO: 980 CELLS/UL (ref 850–3900)
LYMPHOCYTES NFR BLD AUTO: 17.5 %
MCH RBC QN AUTO: 31.3 PG (ref 27–33)
MCHC RBC AUTO-ENTMCNC: 33.9 G/DL (ref 32–36)
MCV RBC AUTO: 92.2 FL (ref 80–100)
MONOCYTES # BLD AUTO: 672 CELLS/UL (ref 200–950)
MONOCYTES NFR BLD AUTO: 12 %
NEUTROPHILS # BLD AUTO: 3578 CELLS/UL (ref 1500–7800)
NEUTROPHILS NFR BLD AUTO: 63.9 %
NONHDLC SERPL-MCNC: 67 MG/DL (CALC)
PLATELET # BLD AUTO: 246 THOUSAND/UL (ref 140–400)
PMV BLD REES-ECKER: 9.5 FL (ref 7.5–12.5)
POTASSIUM SERPL-SCNC: 4.3 MMOL/L (ref 3.5–5.3)
PROT SERPL-MCNC: 5.7 G/DL (ref 6.1–8.1)
PSA FREE MFR SERPL: 60 % (CALC)
PSA FREE SERPL-MCNC: 0.3 NG/ML
PSA SERPL-MCNC: 0.5 NG/ML
RBC # BLD AUTO: 4.64 MILLION/UL (ref 4.2–5.8)
SODIUM SERPL-SCNC: 141 MMOL/L (ref 135–146)
TRIGL SERPL-MCNC: 67 MG/DL
TSH SERPL-ACNC: 2.74 MIU/L (ref 0.4–4.5)
WBC # BLD AUTO: 5.6 THOUSAND/UL (ref 3.8–10.8)

## (undated) DEVICE — GLOVE SRG BIOGEL 7.5

## (undated) DEVICE — ARTHROSCOPY FLOOR MAT

## (undated) DEVICE — DISPOSABLE EQUIPMENT COVER: Brand: SMALL TOWEL DRAPE

## (undated) DEVICE — DRAPE SHEET X-LG

## (undated) DEVICE — 6617 IOBAN II PATIENT ISOLATION DRAPE 5/BX,4BX/CS: Brand: STERI-DRAPE™ IOBAN™ 2

## (undated) DEVICE — GLOVE INDICATOR PI UNDERGLOVE SZ 7.5 BLUE

## (undated) DEVICE — ACE WRAP 6 IN UNSTERILE

## (undated) DEVICE — CHLORAPREP HI-LITE 26ML ORANGE

## (undated) DEVICE — INTENDED FOR TISSUE SEPARATION, AND OTHER PROCEDURES THAT REQUIRE A SHARP SURGICAL BLADE TO PUNCTURE OR CUT.: Brand: BARD-PARKER ® CARBON RIB-BACK BLADES

## (undated) DEVICE — DISPOSABLE OR TOWEL: Brand: CARDINAL HEALTH

## (undated) DEVICE — GLOVE INDICATOR PI UNDERGLOVE SZ 8 BLUE

## (undated) DEVICE — STERILE BETHLEHEM ORIF HIP PK: Brand: CARDINAL HEALTH

## (undated) DEVICE — SUT MONOCRYL 2-0 CT-1 27 IN Y339H

## (undated) DEVICE — ALCOHOL ISOPROPYL BLUE

## (undated) DEVICE — PREP PAD BNS: Brand: CONVERTORS

## (undated) DEVICE — DRESSING MEPILEX AG BORDER POST-OP 4 X 6 IN

## (undated) DEVICE — 3M™ DURAPORE™ SURGICAL TAPE 1538-3, 3 INCH X 10 YARD (7,5CM X 9,1M), 4 ROLLS/BOX: Brand: 3M™ DURAPORE™

## (undated) DEVICE — DRAPE C-ARMOUR

## (undated) DEVICE — PAD CAST 4 IN COTTON NON STERILE

## (undated) DEVICE — PROXIMATE SKIN STAPLERS (35 WIDE) CONTAINS 35 STAINLESS STEEL STAPLES (FIXED HEAD): Brand: PROXIMATE

## (undated) DEVICE — GLOVE SRG BIOGEL 8

## (undated) DEVICE — SUT PDS II 0 CT-1 27 IN Z340H

## (undated) DEVICE — DRESSING MEPILEX AG BORDER 4 X 4 IN